# Patient Record
Sex: FEMALE | Race: WHITE | NOT HISPANIC OR LATINO | Employment: FULL TIME | URBAN - METROPOLITAN AREA
[De-identification: names, ages, dates, MRNs, and addresses within clinical notes are randomized per-mention and may not be internally consistent; named-entity substitution may affect disease eponyms.]

---

## 2017-04-24 ENCOUNTER — TRANSFERRED RECORDS (OUTPATIENT)
Dept: HEALTH INFORMATION MANAGEMENT | Facility: CLINIC | Age: 47
End: 2017-04-24

## 2017-04-27 ENCOUNTER — OFFICE VISIT (OUTPATIENT)
Dept: INTERNAL MEDICINE | Facility: CLINIC | Age: 47
End: 2017-04-27
Payer: COMMERCIAL

## 2017-04-27 VITALS
BODY MASS INDEX: 34.59 KG/M2 | DIASTOLIC BLOOD PRESSURE: 114 MMHG | TEMPERATURE: 97.9 F | SYSTOLIC BLOOD PRESSURE: 150 MMHG | WEIGHT: 207.6 LBS | HEIGHT: 65 IN | HEART RATE: 79 BPM | OXYGEN SATURATION: 97 %

## 2017-04-27 DIAGNOSIS — M75.101 TEAR OF RIGHT ROTATOR CUFF, UNSPECIFIED TEAR EXTENT: Primary | ICD-10-CM

## 2017-04-27 DIAGNOSIS — I10 ESSENTIAL HYPERTENSION WITH GOAL BLOOD PRESSURE LESS THAN 130/80: ICD-10-CM

## 2017-04-27 PROCEDURE — 99214 OFFICE O/P EST MOD 30 MIN: CPT | Performed by: INTERNAL MEDICINE

## 2017-04-27 RX ORDER — HYDROCODONE BITARTRATE AND ACETAMINOPHEN 5; 325 MG/1; MG/1
1 TABLET ORAL
COMMUNITY
Start: 2017-04-24 | End: 2017-05-09

## 2017-04-27 NOTE — NURSING NOTE
"Chief Complaint   Patient presents with     Hospital F/U       Initial BP (!) 150/114  Pulse 79  Temp 97.9  F (36.6  C) (Oral)  Ht 5' 5\" (1.651 m)  Wt 207 lb 9.6 oz (94.2 kg)  LMP 04/03/2017  SpO2 97%  BMI 34.55 kg/m2 Estimated body mass index is 34.55 kg/(m^2) as calculated from the following:    Height as of this encounter: 5' 5\" (1.651 m).    Weight as of this encounter: 207 lb 9.6 oz (94.2 kg).  Medication Reconciliation: complete    "

## 2017-04-27 NOTE — PROGRESS NOTES
"  SUBJECTIVE:                                                    Natali Nicholson is a 46 year old female who presents to clinic today for the following health issues:      ED/UC Followup:    Facility:  [facility]  Date of visit: 4-24-17  Reason for visit: Shoulder, arm pain  Current Status: still not able to raise it the pain is better has not started PT     Natali Nicholson is a 46 y.o. female who presented to the [facility] for evaluation of neck pain. She reports that 2 weeks ago she began developing atraumatic right sided neck/shoulder pain. Her pain has been persistent since then and 2 days ago she additionally awoke unable to left her right arm above her shoulder. Her pain does not radiate and is exacerbated by palpation and attempted range of motion. She denies any numbness or recent trauma/injury. Of note, she works an active job at [employer]. The patient voices no further concerns at this time.     Problem list and histories reviewed & adjusted, as indicated.  Additional history: as documented    Labs reviewed in EPIC    Reviewed and updated as needed this visit by clinical staff  Allergies       Reviewed and updated as needed this visit by Provider         ROS:  Constitutional, HEENT, cardiovascular, pulmonary, gi and gu systems are negative, except as otherwise noted.    OBJECTIVE:                                                    BP (!) 150/114  Pulse 79  Temp 97.9  F (36.6  C) (Oral)  Ht 5' 5\" (1.651 m)  Wt 207 lb 9.6 oz (94.2 kg)  LMP 04/03/2017  SpO2 97%  BMI 34.55 kg/m2  Body mass index is 34.55 kg/(m^2).  GENERAL APPEARANCE: alert and no distress  MS: unable to abduct R shoulder above 90 d. tender    Diagnostic test results:  none     ASSESSMENT/PLAN:                                                    1. Tear of right rotator cuff, unspecified tear extent  - MR Shoulder Right w/o Contrast; Future  - SPORTS MEDICINE REFERRAL    2. Essential hypertension with goal blood pressure less than " 130/80  con't meds. Recheck w/PCP      Follow up with Provider - prn      Lowell Davison MD  [facility]

## 2017-04-27 NOTE — MR AVS SNAPSHOT
After Visit Summary   4/27/2017    Natali Nicholson    MRN: [id number]           Patient Information     Date Of Birth          1970        Visit Information        Provider Department      4/27/2017 8:20 AM Lowell Davison MD [facility]        Today's Diagnoses     Tear of right rotator cuff, unspecified tear extent    -  1    Essential hypertension with goal blood pressure less than 130/80           Follow-ups after your visit        Additional Services     SPORTS MEDICINE REFERRAL       Your provider has referred you to:  [facility]: [facility] - [facility] [place] [facility] [phone]   [email or web address]    Please be aware that coverage of these services is subject to the terms and limitations of your health insurance plan.  Call member services at your health plan with any benefit or coverage questions.      Please bring the following to your appointment:    >>   Any x-rays, CTs or MRIs which have been performed.  Contact the facility where they were done to arrange for  prior to your scheduled appointment.    >>   List of current medications   >>   This referral request   >>   Any documents/labs given to you for this referral                  Future tests that were ordered for you today     Open Future Orders        Priority Expected Expires Ordered    MR Shoulder Right w/o Contrast Routine  4/27/2018 4/27/2017            Who to contact     If you have questions or need follow up information about today's clinic visit or your schedule please contact [facility] directly at [phone].  Normal or non-critical lab and imaging results will be communicated to you by MyChart, letter or phone within 4 business days after the clinic has received the results. If you do not hear from us within 7 days, please contact the clinic through MyChart or phone. If you  "have a critical or abnormal lab result, we will notify you by phone as soon as possible.  Submit refill requests through edupristine or call your pharmacy and they will forward the refill request to us. Please allow 3 business days for your refill to be completed.          Additional Information About Your Visit        [employer] Information     edupristine gives you secure access to your electronic health record. If you see a primary care provider, you can also send messages to your care team and make appointments. If you have questions, please call your primary care clinic.  If you do not have a primary care provider, please call [phone] and they will assist you.        Care EveryWhere ID     This is your Care EveryWhere ID. This could be used by other organizations to access your [place] medical records  [id number]        Your Vitals Were     Pulse Temperature Height Last Period Pulse Oximetry BMI (Body Mass Index)    79 97.9  F (36.6  C) (Oral) 5' 5\" (1.651 m) 04/03/2017 97% 34.55 kg/m2       Blood Pressure from Last 3 Encounters:   04/27/17 (!) 150/114   11/08/16 116/70   10/04/16 140/90    Weight from Last 3 Encounters:   04/27/17 207 lb 9.6 oz (94.2 kg)   11/08/16 199 lb (90.3 kg)   10/04/16 198 lb (89.8 kg)              We Performed the Following     SPORTS MEDICINE REFERRAL        Primary Care Provider Office Phone # Fax #    Pablo Gomez [phone]       [facility] [address] [place] [id number]        Thank you!     Thank you for choosing [facility]  for your care. Our goal is always to provide you with excellent care. Hearing back from our patients is one way we can continue to improve our services. Please take a few minutes to complete the written survey that you may receive in the mail after your visit with us. Thank you!             Your Updated Medication List - Protect others around you: Learn how to safely use, store and throw away your " medicines at www.disposemymeds.org.          This list is accurate as of: 4/27/17  8:55 AM.  Always use your most recent med list.                   Brand Name Dispense Instructions for use    aspirin 81 MG tablet     100    ONE DAILY       blood glucose monitoring lancets     1 Box    Use to test blood sugars 1 times daily or as directed. Reference #[id number]       blood glucose monitoring meter device kit     1 kit    Use to test blood sugar 1 times daily or as directed.  Reference #[id number]       blood glucose monitoring test strip    ONE TOUCH VERIO IQ    100 each    Use to test blood sugars 1 times daily or as directed.  Reference #[id number]       DULoxetine 30 MG EC capsule    CYMBALTA    180 capsule    Take 1 capsule (30 mg) by mouth 2 times daily       HYDROcodone-acetaminophen 5-325 MG per tablet    NORCO     Take 1 tablet by mouth       levothyroxine 88 MCG tablet    SYNTHROID/LEVOTHROID    90 tablet    Take 1 tablet (88 mcg) by mouth daily       lisinopril 40 MG tablet    PRINIVIL/ZESTRIL    90 tablet    Take 1 tablet (40 mg) by mouth daily       metFORMIN 1000 MG tablet    GLUCOPHAGE    180 tablet    Take 1 tablet (1,000 mg) by mouth 2 times daily (with meals) with breakfast and dinner.       simvastatin 40 MG tablet    ZOCOR    90 tablet    Take 1 tablet (40 mg) by mouth At Bedtime

## 2017-05-02 ENCOUNTER — HOSPITAL ENCOUNTER (OUTPATIENT)
Dept: MRI IMAGING | Facility: CLINIC | Age: 47
Discharge: HOME | End: 2017-05-02
Attending: INTERNAL MEDICINE | Admitting: INTERNAL MEDICINE
Payer: COMMERCIAL

## 2017-05-02 DIAGNOSIS — M75.101 TEAR OF RIGHT ROTATOR CUFF, UNSPECIFIED TEAR EXTENT: ICD-10-CM

## 2017-05-02 PROCEDURE — 73221 MRI JOINT UPR EXTREM W/O DYE: CPT | Mod: RT

## 2017-05-09 ENCOUNTER — OFFICE VISIT (OUTPATIENT)
Dept: ORTHOPEDICS | Facility: CLINIC | Age: 47
End: 2017-05-09
Payer: COMMERCIAL

## 2017-05-09 VITALS
DIASTOLIC BLOOD PRESSURE: 85 MMHG | BODY MASS INDEX: 34.49 KG/M2 | WEIGHT: 207 LBS | HEIGHT: 65 IN | SYSTOLIC BLOOD PRESSURE: 128 MMHG

## 2017-05-09 DIAGNOSIS — R29.898 WEAKNESS OF RIGHT UPPER EXTREMITY: Primary | ICD-10-CM

## 2017-05-09 DIAGNOSIS — M25.511 ACUTE PAIN OF RIGHT SHOULDER: ICD-10-CM

## 2017-05-09 PROCEDURE — 99204 OFFICE O/P NEW MOD 45 MIN: CPT | Performed by: PHYSICAL MEDICINE & REHABILITATION

## 2017-05-09 NOTE — MR AVS SNAPSHOT
After Visit Summary   5/9/2017    Natali Nicholson    MRN: [id number]           Patient Information     Date Of Birth          1970        Visit Information        Provider Department      5/9/2017 9:20 AM Juan High DO [facility] SPORTS MEDICINE        Today's Diagnoses     Weakness of right upper extremity    -  1    Acute pain of right shoulder          Care Instructions    We addressed the following today:    1.Weakness of the right upper extremity   2. Right shoulder pain    Activity modification as discussed  Topical Treatments: Ice  Over the counter medication: Acetaminophen (Tylenol) 1000mg every 6 hours with food (Maximum of 3000mg/day)  Other specific instructions: Jossue Orellana MD - [facility] - call [phone] to schedule EMG  Follow up 2-3 business days after EMG to discuss results (sooner if needed; call direct clinic number [[phone]] at any time with questions or concerns)            Follow-ups after your visit        Additional Services     ORTHO  REFERRAL       Coverage of these services is subject to the terms and limitations of your health insurance plan. Please call member services at your health plan with any benefit or coverage questions.       [facility] is referring you to the Orthopedic  Services at [facility].       The  representative will assist you in the coordination of your orthopedic and musculoskeletal care as prescribed by your physician.    The  representative will call you within 24 hours or   You may contact the  representative at:   [phone] for [place] and [facility]  [phone] for [facility], and [facility]  [phone] for [facility]    Type of Referral : Jossue Orellana MD - [facility] - call [phone] to schedule    EMG/NCV of the right upper extremity - evaluate for brachial plexopathy, peripheral  "nerve entrapment, trunk/cord/division abnormalities, etc.      Timeframe requested: Within 2 weeks       If X-rays, CT or MRI's   have been performed, please contact the facility where they were done, to arrange for  prior to your scheduled appointment. Please bring this referral request to your appointment and present it to your specialist.                  Who to contact     If you have questions or need follow up information about today's clinic visit or your schedule please contact [facility] SPORTS MEDICINE directly at [phone].  Normal or non-critical lab and imaging results will be communicated to you by SynCardia Systemshart, letter or phone within 4 business days after the clinic has received the results. If you do not hear from us within 7 days, please contact the clinic through Neuronext or phone. If you have a critical or abnormal lab result, we will notify you by phone as soon as possible.  Submit refill requests through YouRenew or call your pharmacy and they will forward the refill request to us. Please allow 3 business days for your refill to be completed.          Additional Information About Your Visit        YouRenew Information     YouRenew gives you secure access to your electronic health record. If you see a primary care provider, you can also send messages to your care team and make appointments. If you have questions, please call your primary care clinic.  If you do not have a primary care provider, please call [phone] and they will assist you.        Care EveryWhere ID     This is your Care EveryWhere ID. This could be used by other organizations to access your [place] medical records  [id number]        Your Vitals Were     Height Last Period BMI (Body Mass Index)             5' 5\" (1.651 m) 04/03/2017 34.45 kg/m2          Blood Pressure from Last 3 Encounters:   05/09/17 128/85   04/27/17 (!) 150/114   11/08/16 116/70    Weight from Last 3 Encounters:   05/09/17 207 lb (93.9 kg) "   04/27/17 207 lb 9.6 oz (94.2 kg)   11/08/16 199 lb (90.3 kg)              We Performed the Following     ORTHO  REFERRAL          Today's Medication Changes          These changes are accurate as of: 5/9/17 10:23 AM.  If you have any questions, ask your nurse or doctor.               Stop taking these medicines if you haven't already. Please contact your care team if you have questions.     HYDROcodone-acetaminophen 5-325 MG per tablet   Commonly known as:  NORCO   Stopped by:  Juan High,                     Primary Care Provider Office Phone # Fax #    Pablo Gomez [phone]       [facility] [address] [facility] [address]        Thank you!     Thank you for choosing [facility]  for your care. Our goal is always to provide you with excellent care. Hearing back from our patients is one way we can continue to improve our services. Please take a few minutes to complete the written survey that you may receive in the mail after your visit with us. Thank you!             Your Updated Medication List - Protect others around you: Learn how to safely use, store and throw away your medicines at www.disposemymeds.org.          This list is accurate as of: 5/9/17 10:23 AM.  Always use your most recent med list.                   Brand Name Dispense Instructions for use    aspirin 81 MG tablet     100    ONE DAILY       blood glucose monitoring lancets     1 Box    Use to test blood sugars 1 times daily or as directed. Reference #[id number]       blood glucose monitoring meter device kit     1 kit    Use to test blood sugar 1 times daily or as directed.  Reference #[id number]       blood glucose monitoring test strip    ONE TOUCH VERIO IQ    100 each    Use to test blood sugars 1 times daily or as directed.  Reference #[id number]       DULoxetine 30 MG EC capsule    CYMBALTA    180 capsule    Take 1 capsule (30 mg) by mouth 2 times daily        levothyroxine 88 MCG tablet    SYNTHROID/LEVOTHROID    90 tablet    Take 1 tablet (88 mcg) by mouth daily       lisinopril 40 MG tablet    PRINIVIL/ZESTRIL    90 tablet    Take 1 tablet (40 mg) by mouth daily       metFORMIN 1000 MG tablet    GLUCOPHAGE    180 tablet    Take 1 tablet (1,000 mg) by mouth 2 times daily (with meals) with breakfast and dinner.       simvastatin 40 MG tablet    ZOCOR    90 tablet    Take 1 tablet (40 mg) by mouth At Bedtime

## 2017-05-09 NOTE — PROGRESS NOTES
[facility]   Clinic Visit s May 9, 2017    Subjective:  Natali Nicholson is a 46 year old right-hand dominant female, who is seen in consultation at the request of Dr. Davison for evaluation of right shoulder/lateral upper arm pain.    Symptoms began 3.5 weeks ago.  Reports insidious onset without acute precipitating event.  Reports right shoulder pain that is located lateral with radiation absent.  Pain is 8/10 in maximal severity and 5/10 currently.  Symptoms are generally worse with nothing in particular and better with massage.  Other treatment has consisted of Norco and Ibuprofen with moderate relief.  Denies any numbness/tingling of the right upper extremity.  Denies any weakness of the right upper extremity.  Denies any previous right shoulder injuries/surgeries.    Patient's past medical, surgical, social, and family histories are reviewed today.  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    LUE C7 level     CTS (carpal tunnel syndrome) 10/3/2011     History of colposcopy with cervical biopsy 10/22/09,10/16/12    neg,neg     HTN (hypertension)      Hyperlipidemia LDL goal <100       Hypothyroid      Lumbago 10/07    Mild degenerative disc disease at L5-S1     Obesity      Papanicolaou smear of cervix with low grade squamous intraepithelial lesion (LGSIL) 10/18/10     Recurrent major depression (H)      Type 2 diabetes mellitus without complication  (goal A1C<7) 10/24/2015       Review of Systems:  Constitutional: NEGATIVE for fever, chills, or change in weight  Skin: NEGATIVE for worrisome rashes, moles, or lesions  Neuro: NEGATIVE for weakness of the right upper extremity  MSK: see HPI  Additional 10 point ROS is negative other than symptoms noted above and in HPI    Objective:  /85 (BP Location: Left arm, Patient Position: Chair, Cuff  "Size: Adult Regular)  Ht 5' 5\" (1.651 m)  Wt 207 lb (93.9 kg)  LMP 04/03/2017  BMI 34.45 kg/m2  General: healthy, alert, obese, and in no distress  Skin: no suspicious lesions or rashes  Psych: mentation appears normal and affect normal/bright  HEENT: no scleral icterus  CV: no pedal edema  Resp: normal respiratory effort without conversational dyspnea   Neuro: motor strength as noted below  Lymph: no palpable lymphadenopathy    MSK:    RIGHT SHOULDER  Inspection:    No swelling, bruising, discoloration, or obvious deformity/asymmetry  Palpation:    Tender about the anterior capsule    No tenderness over the AC joint, acromion, or greater tuberosity  Active Range of Motion:     Abduction 600 /  /  / IR T12.  Opposite arm abduction/flexion/ER within normal limits and IR T10  Passive Range of Motion:    Abduction and forward flexion within normal limits  Strength:    Deltoid 4+/5 / ER 4/5 / IR 5-/5 / biceps 4+/5 / triceps 4+/5 / wrist flexion/extension 5-/5 / finger flexion 4+/5  Special Tests:    Positive: Neer's (end range)    Negative: Rosario', crossed arm adduction, Twiggs's, and belly testing    Contralateral shoulder examined - range of motion, strength, and function within normal limits    Imaging:  No x-rays indicated during today's clinical visit   Previous films were reviewed today, independent visualization of images was performed, and results were discussed with the patient   MRI RIGHT SHOULDER WITHOUT CONTRAST - 5/2/2017   IMPRESSION:   1. Unremarkable MRI of the right shoulder with no rotator cuff tear seen.    ASSESSMENT:  1. Weakness of the right upper extremity  2. Acute right shoulder pain - differential diagnoses includes adhesive capsulitis, etc.    PLAN:  1. EMG/NCV of the right upper extremity for further evaluation of current medical state.  2. Activity modification as discussed, including limitation of activities that cause pain/discomfort.  3. Acetaminophen/ice as needed for " improved pain control.  4. Follow-up 1-2 business days after completion of further diagnostic testing for discussion of results/further medical care.  Consider formal physical therapy referral, right glenohumeral joint corticosteroid injection with ultrasound guidance, MRI of the brachial plexus, etc. as deemed appropriate moving forward. Instructed to contact our office should the condition evolve or worsen.    Patient's conditions were thoroughly discussed during today's visit with greater than 50% of the visit spent counseling the patient with total time spent face-to-face with the patient being 15 minutes.    Juan High DO, [facility]    Disclaimer: This note consists of symbols derived from keyboarding, dictation and/or voice recognition software. As a result, there may be errors in the script that have gone undetected. Please consider this when interpreting information found in this chart.

## 2017-05-09 NOTE — PATIENT INSTRUCTIONS
We addressed the following today:    1.Weakness of the right upper extremity   2. Right shoulder pain    Activity modification as discussed  Topical Treatments: Ice  Over the counter medication: Acetaminophen (Tylenol) 1000mg every 6 hours with food (Maximum of 3000mg/day)  Other specific instructions: Jossue Orellana MD - [facility] - call [phone] to schedule EMG  Follow up 2-3 business days after EMG to discuss results (sooner if needed; call direct clinic number [[phone]] at any time with questions or concerns)

## 2017-05-09 NOTE — Clinical Note
Dear Natali Black [place] [facility] [place].  Please refer to the visit note at your convenience and feel free to contact me should you have any questions.  Sincerely,  Juan High DO, [facility]

## 2017-05-30 ENCOUNTER — TRANSFERRED RECORDS (OUTPATIENT)
Dept: HEALTH INFORMATION MANAGEMENT | Facility: CLINIC | Age: 47
End: 2017-05-30

## 2017-06-02 ENCOUNTER — RADIANT APPOINTMENT (OUTPATIENT)
Dept: GENERAL RADIOLOGY | Facility: CLINIC | Age: 47
End: 2017-06-02
Attending: PHYSICAL MEDICINE & REHABILITATION
Payer: COMMERCIAL

## 2017-06-02 ENCOUNTER — OFFICE VISIT (OUTPATIENT)
Dept: ORTHOPEDICS | Facility: CLINIC | Age: 47
End: 2017-06-02
Payer: COMMERCIAL

## 2017-06-02 VITALS
HEIGHT: 65 IN | WEIGHT: 207 LBS | BODY MASS INDEX: 34.49 KG/M2 | SYSTOLIC BLOOD PRESSURE: 115 MMHG | DIASTOLIC BLOOD PRESSURE: 77 MMHG

## 2017-06-02 DIAGNOSIS — R29.898 WEAKNESS OF RIGHT UPPER EXTREMITY: Primary | ICD-10-CM

## 2017-06-02 DIAGNOSIS — M54.2 CERVICALGIA: ICD-10-CM

## 2017-06-02 PROCEDURE — 72040 X-RAY EXAM NECK SPINE 2-3 VW: CPT

## 2017-06-02 PROCEDURE — 99214 OFFICE O/P EST MOD 30 MIN: CPT | Performed by: PHYSICAL MEDICINE & REHABILITATION

## 2017-06-02 NOTE — NURSING NOTE
"Chief Complaint   Patient presents with     RECHECK     loss of function of R UE       Initial /77  Ht 5' 5\" (1.651 m)  Wt 207 lb (93.9 kg)  BMI 34.45 kg/m2 Estimated body mass index is 34.45 kg/(m^2) as calculated from the following:    Height as of this encounter: 5' 5\" (1.651 m).    Weight as of this encounter: 207 lb (93.9 kg).  Medication Reconciliation: complete     Vikash Mcqueen, ATC      "

## 2017-06-02 NOTE — MR AVS SNAPSHOT
After Visit Summary   6/2/2017    Natali Nicholson    MRN: [id number]           Patient Information     Date Of Birth          1970        Visit Information        Provider Department      6/2/2017 3:00 PM Juan High, DO [facility]        Today's Diagnoses     Weakness of right upper extremity    -  1      Care Instructions    We addressed the following today:    1. Weakness of right upper extremity    Activity modification as discussed  MRI at [facility] - call [phone] to schedule  Follow up 1-2 business days after completion of further diagnostic testing/imaging for discussion of results/further medical care (sooner if needed; call direct clinic number [[phone]] at any time with questions or concerns)              Follow-ups after your visit        Future tests that were ordered for you today     Open Future Orders        Priority Expected Expires Ordered    MR Cervical Spine w/o Contrast Routine 6/2/2017 6/2/2018 6/2/2017            Who to contact     If you have questions or need follow up information about today's clinic visit or your schedule please contact [facility] directly at [phone].  Normal or non-critical lab and imaging results will be communicated to you by My Ad Boxhart, letter or phone within 4 business days after the clinic has received the results. If you do not hear from us within 7 days, please contact the clinic through Realeyest or phone. If you have a critical or abnormal lab result, we will notify you by phone as soon as possible.  Submit refill requests through [employer] or call your pharmacy and they will forward the refill request to us. Please allow 3 business days for your refill to be completed.          Additional Information About Your Visit        My Ad Boxhart Information     [employer] gives you secure access to your electronic health record. If you see a primary care provider, you can also send messages to your care team and  "make appointments. If you have questions, please call your primary care clinic.  If you do not have a primary care provider, please call [phone] and they will assist you.        Care EveryWhere ID     This is your Care EveryWhere ID. This could be used by other organizations to access your [facility] medical records  [id number]        Your Vitals Were     Height BMI (Body Mass Index)                5' 5\" (1.651 m) 34.45 kg/m2           Blood Pressure from Last 3 Encounters:   06/02/17 115/77   05/09/17 128/85   04/27/17 (!) 150/114    Weight from Last 3 Encounters:   06/02/17 207 lb (93.9 kg)   05/09/17 207 lb (93.9 kg)   04/27/17 207 lb 9.6 oz (94.2 kg)               Primary Care Provider Office Phone # Fax #    Pablo Gomez [phone]       [facility] [address] [place] [address]        Thank you!     Thank you for choosing [facility]  for your care. Our goal is always to provide you with excellent care. Hearing back from our patients is one way we can continue to improve our services. Please take a few minutes to complete the written survey that you may receive in the mail after your visit with us. Thank you!             Your Updated Medication List - Protect others around you: Learn how to safely use, store and throw away your medicines at www.disposemymeds.org.          This list is accurate as of: 6/2/17  4:37 PM.  Always use your most recent med list.                   Brand Name Dispense Instructions for use    aspirin 81 MG tablet     100    ONE DAILY       blood glucose monitoring lancets     1 Box    Use to test blood sugars 1 times daily or as directed. Reference #[id number]       blood glucose monitoring meter device kit     1 kit    Use to test blood sugar 1 times daily or as directed.  Reference #[id number]       blood glucose monitoring test strip    ONE TOUCH VERIO IQ    100 each    Use to test blood sugars 1 times daily or as directed.  " Reference #[id number]       DULoxetine 30 MG EC capsule    CYMBALTA    180 capsule    Take 1 capsule (30 mg) by mouth 2 times daily       levothyroxine 88 MCG tablet    SYNTHROID/LEVOTHROID    90 tablet    Take 1 tablet (88 mcg) by mouth daily       lisinopril 40 MG tablet    PRINIVIL/ZESTRIL    90 tablet    Take 1 tablet (40 mg) by mouth daily       metFORMIN 1000 MG tablet    GLUCOPHAGE    180 tablet    Take 1 tablet (1,000 mg) by mouth 2 times daily (with meals) with breakfast and dinner.       simvastatin 40 MG tablet    ZOCOR    90 tablet    Take 1 tablet (40 mg) by mouth At Bedtime

## 2017-06-02 NOTE — PATIENT INSTRUCTIONS
We addressed the following today:    1. Weakness of right upper extremity    Activity modification as discussed  MRI at [facility] - call [phone] to schedule  Follow up 1-2 business days after completion of further diagnostic testing/imaging for discussion of results/further medical care (sooner if needed; call direct clinic number [[phone]] at any time with questions or concerns)

## 2017-06-02 NOTE — PROGRESS NOTES
" [facility]   Follow-up Visit s Jun 2, 2017    Subjective:  Natali Nicholson is a 46 year old female who is seen in follow up for evaluation of right upper extremity weakness for discussion of EMG/NCV results of the right upper extremity from [facility].  Last visit was on 5/9/2017.  Since that time, symptoms have been unchanged since last visit. Has not been using any oral pain medications for improvement of symptoms.     Notes weakness of the right upper extremity without any neck/shoulder/upper extremity pain. Denies any numbness/tingling in the right upper extremity. Denies any trauma/falls since last clinical visit.    Patient's past medical, surgical, social, and family histories are reviewed today    Objective:  /77  Ht 5' 5\" (1.651 m)  Wt 207 lb (93.9 kg)  BMI 34.45 kg/m2  General: healthy, alert, obese, and in no distress    Diagnostic Testing/Imaging:  Outside report from [facility] were reviewed today and results were discussed with the patient  Cervical spine x-rays were ordered, independent visualization was performed today, and results were discussed with the patient  Preliminary Impression:  1. Straightening of the normal cervical lordosis.  2. Minimal degenerative changes noted of the lower cervical spine.  3. Negative for fracture, subluxation, or other acute osseous abnormalities.    EMG/NCV of the Right Upper Extremity - 5/30/2017  Impression:  1. This is an abnormal study.  2. There is electrodiagnostic evidence of a severe acute right C5 radiculopathy with signs of acute denervation as well as decreased recruitment.  3. There is also electrodiagnostic evidence of a moderate right median mononeuropathy at the wrist with focal slowing of the motor and sensory fibers across the wrist and a decreased sensory amplitude on nerve conduction studies. There are no associated signs of acute denervation on needle electromyography.  4. " There is no electrodiagnostic evidence of brachial plexopathy within the possible finding discussed above, focal ulnar or radial neuropathy, or generalized polyneuropathy affecting the right upper limb.    ASSESSMENT:  1. Weakness of the right upper extremity    PLAN:  1. MRI of the cervical spine without contrast at [facility] for further evaluation of current medical state.  2. Activity modification as discussed, including limitation of activities that cause pain/discomfort.  3. Follow-up 1-2 business days after completion of further diagnostic testing/imaging for discussion of results/further medical care.  Consider MRI of the brachial plexus, etc. as deemed appropriate moving forward. Instructed to follow-up if change of symptoms arise.    Patient's conditions were thoroughly discussed during today's visit with greater than 50% of the visit spent counseling the patient with total time spent face-to-face with the patient being 15 minutes.    Juan High DO, [facility]    Disclaimer: This note consists of symbols derived from keyboarding, dictation and/or voice recognition software. As a result, there may be errors in the script that have gone undetected. Please consider this when interpreting information found in this chart.

## 2017-06-02 NOTE — Clinical Note
Dear Natali Guy saw me at [facility] on Jun 2, 2017.  Please refer to the visit note at your convenience and feel free to contact me should you have any questions.  Sincerely,  Juan High DO, [facility]

## 2017-06-15 ENCOUNTER — TRANSFERRED RECORDS (OUTPATIENT)
Dept: HEALTH INFORMATION MANAGEMENT | Facility: CLINIC | Age: 47
End: 2017-06-15

## 2017-06-20 ENCOUNTER — OFFICE VISIT (OUTPATIENT)
Dept: ORTHOPEDICS | Facility: CLINIC | Age: 47
End: 2017-06-20
Payer: COMMERCIAL

## 2017-06-20 VITALS
DIASTOLIC BLOOD PRESSURE: 72 MMHG | HEIGHT: 64 IN | SYSTOLIC BLOOD PRESSURE: 106 MMHG | WEIGHT: 207 LBS | BODY MASS INDEX: 35.34 KG/M2

## 2017-06-20 DIAGNOSIS — M50.20 PROTRUSION OF CERVICAL INTERVERTEBRAL DISC: ICD-10-CM

## 2017-06-20 DIAGNOSIS — M50.30 DEGENERATIVE DISC DISEASE, CERVICAL: ICD-10-CM

## 2017-06-20 DIAGNOSIS — M48.02 CERVICAL SPINAL STENOSIS: ICD-10-CM

## 2017-06-20 DIAGNOSIS — R29.898 WEAKNESS OF RIGHT UPPER EXTREMITY: Primary | ICD-10-CM

## 2017-06-20 PROCEDURE — 99214 OFFICE O/P EST MOD 30 MIN: CPT | Performed by: PHYSICAL MEDICINE & REHABILITATION

## 2017-06-20 NOTE — PATIENT INSTRUCTIONS
We addressed the following today:    1. Weakness of right upper extremity  2. Cervical disc protrusion/arthritis/spinal stenosis    Activity modification as discussed  Other specific instructions: Referral to spine surgery for consideration of surgical intervention for further treatment purposes  Follow up as needed for further evaluation/medical care (sooner if needed; call direct clinic number [[phone]] at any time with questions or concerns)

## 2017-06-20 NOTE — MR AVS SNAPSHOT
After Visit Summary   6/20/2017    Natali Nicholson    MRN: [id number]           Patient Information     Date Of Birth          1970        Visit Information        Provider Department      6/20/2017 10:40 AM Juan High DO [facility]        Today's Diagnoses     Weakness of right upper extremity    -  1    Cervical spinal stenosis        Protrusion of cervical intervertebral disc        Degenerative disc disease, cervical          Care Instructions    We addressed the following today:    1. Weakness of right upper extremity  2. Cervical disc protrusion/arthritis/spinal stenosis    Activity modification as discussed  Other specific instructions: Referral to spine surgery for consideration of surgical intervention for further treatment purposes  Follow up as needed for further evaluation/medical care (sooner if needed; call direct clinic number [[phone]] at any time with questions or concerns)              Follow-ups after your visit        Additional Services     ORTHO  REFERRAL       Coverage of these services is subject to the terms and limitations of your health insurance plan. Please call member services at your health plan with any benefit or coverage questions.       [facility] is referring you to the Orthopedic  Services at [facility].       The  representative will assist you in the coordination of your orthopedic and musculoskeletal care as prescribed by your physician.    The  representative will call you within 24 hours or   You may contact the  representative at:   [phone] for [facility]  [phone] for [facility], and [facility]  [phone] for [facility]    Type of Referral : Spine: Lumbar  **Choose Medical Spine Specialist (unless patient was seen by a Medical Spine Specialist within the past 6 months).**  Surgical Evaluation is advised  if the patient presents with one or more of the following red flags: Evidence of Spinal Tumor, Infection or Fracture, Cauda Equina Syndrome, Sudden or Progressive Weakness, Loss of Bowel or Bladder Control, or any other documented emergent neurological condition resulting from a Lumbar Spinal Condition. Spine Surgeon - Dr. Salgado ONLY - no mid level providers      Timeframe requested: 3 - 5 days       If X-rays, CT or MRI's   have been performed, please contact the facility where they were done, to arrange for  prior to your scheduled appointment. Please bring this referral request to your appointment and present it to your specialist.                  Follow-up notes from your care team     Return if symptoms worsen or fail to improve.      Who to contact     If you have questions or need follow up information about today's clinic visit or your schedule please contact [facility] directly at [phone].  Normal or non-critical lab and imaging results will be communicated to you by Atlassianhart, letter or phone within 4 business days after the clinic has received the results. If you do not hear from us within 7 days, please contact the clinic through Atlassianhart or phone. If you have a critical or abnormal lab result, we will notify you by phone as soon as possible.  Submit refill requests through [employer] or call your pharmacy and they will forward the refill request to us. Please allow 3 business days for your refill to be completed.          Additional Information About Your Visit        EventRegist Information     [employer] gives you secure access to your electronic health record. If you see a primary care provider, you can also send messages to your care team and make appointments. If you have questions, please call your primary care clinic.  If you do not have a primary care provider, please call [phone] and they will assist you.        Care EveryWhere ID     This is your Care EveryWhere ID.  "This could be used by other organizations to access your [place] medical records  [id number]        Your Vitals Were     Height BMI (Body Mass Index)                5' 4\" (1.626 m) 35.53 kg/m2           Blood Pressure from Last 3 Encounters:   06/20/17 106/72   06/02/17 115/77   05/09/17 128/85    Weight from Last 3 Encounters:   06/20/17 207 lb (93.9 kg)   06/02/17 207 lb (93.9 kg)   05/09/17 207 lb (93.9 kg)              We Performed the Following     ORTHO  REFERRAL        Primary Care Provider Office Phone # Fax #    Pablo Gomez [phone]       [facility] [address] [place] [address]        Thank you!     Thank you for choosing [facility]  for your care. Our goal is always to provide you with excellent care. Hearing back from our patients is one way we can continue to improve our services. Please take a few minutes to complete the written survey that you may receive in the mail after your visit with us. Thank you!             Your Updated Medication List - Protect others around you: Learn how to safely use, store and throw away your medicines at www.disposemymeds.org.          This list is accurate as of: 6/20/17 11:45 AM.  Always use your most recent med list.                   Brand Name Dispense Instructions for use    aspirin 81 MG tablet     100    ONE DAILY       blood glucose monitoring lancets     1 Box    Use to test blood sugars 1 times daily or as directed. Reference #[id number]       blood glucose monitoring meter device kit     1 kit    Use to test blood sugar 1 times daily or as directed.  Reference #[id number]       blood glucose monitoring test strip    ONE TOUCH VERIO IQ    100 each    Use to test blood sugars 1 times daily or as directed.  Reference #[id number]       DULoxetine 30 MG EC capsule    CYMBALTA    180 capsule    Take 1 capsule (30 mg) by mouth 2 times daily       levothyroxine 88 MCG tablet    " SYNTHROID/LEVOTHROID    90 tablet    Take 1 tablet (88 mcg) by mouth daily       lisinopril 40 MG tablet    PRINIVIL/ZESTRIL    90 tablet    Take 1 tablet (40 mg) by mouth daily       metFORMIN 1000 MG tablet    GLUCOPHAGE    180 tablet    Take 1 tablet (1,000 mg) by mouth 2 times daily (with meals) with breakfast and dinner.       simvastatin 40 MG tablet    ZOCOR    90 tablet    Take 1 tablet (40 mg) by mouth At Bedtime

## 2017-06-20 NOTE — PROGRESS NOTES
" [facility]   Follow-up Visit s 2017    Subjective:  Natali Nicholson is a 46 year old female who is seen in follow up for weakness of the right upper extremity for discussion of MRI of the cervical spine without contrast results from [facility].  Last visit was on 2017.  Since that time, symptoms have somewhat improved regarding upper extremity strength. Has not been using any oral pain medications for improvement of symptoms.     Notes weakness of the right upper extremity without associated right shoulder/neck pain. Symptoms are worse with activities of daily living. Denies any numbness/tingling of the right upper extremity. Notes weakness of the right upper extremity. Denies any trauma/falls since last clinical visit.    Patient's past medical, surgical, social, and family histories are reviewed today    Objective:  /72  Ht 5' 4\" (1.626 m)  Wt 207 lb (93.9 kg)  BMI 35.53 kg/m2  General: healthy, alert, and in no distress  Skin: no suspicious lesions or rashes  Neuro: motor exam as noted below    MSK:    CERVICAL SPINE  Strength:    Deltoid (C5) 4/5 (right), biceps (C6) 4+/5 (right), wrist extension (C6) 5-/5 (right), triceps (C7) 5-/5 (right), wrist flexion (C7) 5/5, and finger flexion (C8) 5/5  Special Tests:    Positive: None    Negative: Spurling's (right)    RIGHT SHOULDER  Strength:    ER 5-/5 / IR 5/5   Special Tests:    Positive: Supraspinatus (empty can) - weakness without pain  Imaging:  No x-rays indicated during today's visit  Outside films from [facility] were reviewed today, independent visualization of images was performed, and results were discussed with the patient  MRI of the Cervical Spine - 6/15/2017  CONCLUSION: Reversal of the cervical lordotic curvature with multilevel degenerative disc disease and specific findings according to level includin. C4-5 right posterior lateral and foraminal disc protrusion with right C5 nerve root impingement. Mild ventral " cord flattening with mild central spinal canal stenosis. Moderate to severe left foraminal stenosis.  2. C5-6 central disc protrusion with mild ventral cord flattening and no central spinal canal stenosis. Severe right foraminal stenosis with C6 nerve root impingement. Moderate to severe left foraminal stenosis.    ASSESSMENT:  1. Weakness of the right upper extremity  2. Cervical spinal stenosis  3. Cervical disc protrusion  4. Cervical degenerative disc disease    PLAN:  1. Referral to spine surgery for consideration of surgical intervention for further treatment purposes.  2. Activity modification as discussed, including limitation of activities that cause pain/discomfort.  3. Follow-up as needed for further evaluation/medical care.  Consider MRI of the right shoulder without contrast, right C5 selective nerve root block, right C6 selective nerve root block, etc. as deemed appropriate after spine surgery consultation.  Instructed to follow-up if change of symptoms arise.    Patient's conditions were thoroughly discussed during today's visit with greater than 50% of the visit spent counseling the patient with total time spent face-to-face with the patient being 15 minutes.    Juan High DO, [facility]    Disclaimer: This note consists of symbols derived from keyboarding, dictation and/or voice recognition software. As a result, there may be errors in the script that have gone undetected. Please consider this when interpreting information found in this chart.

## 2017-06-20 NOTE — NURSING NOTE
"Chief Complaint   Patient presents with     Musculoskeletal Problem     f/u MRI cervical spine from [facility]       Initial /72  Ht 5' 4\" (1.626 m)  Wt 207 lb (93.9 kg)  BMI 35.53 kg/m2 Estimated body mass index is 35.53 kg/(m^2) as calculated from the following:    Height as of this encounter: 5' 4\" (1.626 m).    Weight as of this encounter: 207 lb (93.9 kg).  Medication Reconciliation: complete     Talita Duckworth, ATC    "

## 2017-06-20 NOTE — Clinical Note
Dear Natali Guy saw me at [facility] [place], 2017.  Please refer to the visit note at your convenience and feel free to contact me should you have any questions.  Sincerely,  Juan High DO, [facility]

## 2017-07-11 ENCOUNTER — OFFICE VISIT (OUTPATIENT)
Dept: NEUROSURGERY | Facility: CLINIC | Age: 47
End: 2017-07-11
Attending: NEUROLOGICAL SURGERY
Payer: COMMERCIAL

## 2017-07-11 VITALS
TEMPERATURE: 98.1 F | DIASTOLIC BLOOD PRESSURE: 81 MMHG | BODY MASS INDEX: 33.82 KG/M2 | SYSTOLIC BLOOD PRESSURE: 114 MMHG | OXYGEN SATURATION: 97 % | HEIGHT: 65 IN | WEIGHT: 203 LBS | HEART RATE: 67 BPM

## 2017-07-11 DIAGNOSIS — R29.898 RIGHT ARM WEAKNESS: ICD-10-CM

## 2017-07-11 DIAGNOSIS — M54.12 CERVICAL RADICULOPATHY: Primary | ICD-10-CM

## 2017-07-11 PROCEDURE — 99211 OFF/OP EST MAY X REQ PHY/QHP: CPT | Performed by: NEUROLOGICAL SURGERY

## 2017-07-11 PROCEDURE — 99203 OFFICE O/P NEW LOW 30 MIN: CPT | Performed by: NEUROLOGICAL SURGERY

## 2017-07-11 NOTE — NURSING NOTE
"Natali Nicholson is a 46 year old female who presents for:  Chief Complaint   Patient presents with     Neurologic Problem     referral from Dr. High for cervical DDD        Initial Vitals:  There were no vitals taken for this visit. Estimated body mass index is 35.53 kg/(m^2) as calculated from the following:    Height as of 6/20/17: 5' 4\" (1.626 m).    Weight as of 6/20/17: 207 lb (93.9 kg).. There is no height or weight on file to calculate BSA. BP completed using cuff size: regular  forearm  Data Unavailable    Do you feel safe in your environment?  Yes  Do you need any refills today? No    Nursing Comments: referral from Dr. High for cervical DDD.  Patient rates her pain today as  Frozen and weakness right shoulder, hard to raise arm      5 min. nursing intake time  Magalis Barry, [facility], AAS      Discharge plan:   Patient Instructions  Pre-Operative:  -Surgery scheduled at [facility] for C4-6 ACDF (anterior cervical discectomy and fusion)  -Surgical risks: blood clots in the leg or lung, problems urinating, nerve damage, drainage from the incision, infection,stiffness  - Pre-operative physical with primary care physician within 30 days of surgical date.   -1 night hospitalization.    -Shower procedure: please shower with antimicrobial soap the night before surgery and morning of surgery. Please refer to showering instruction sheet in folder.  -Stop all solid foods 8 hours before surgery.  -Keep drinking clear liquids until 4 hours before surgery. Clear liquids include water, clear juice, black coffee, or clear tea without milk, Gatorade, clear soda.   - Discontinue Aspirin, NSAIDs (Advil/Ibuprofen, Naproxen,Nuprin,Relafen/Nabumetone, Diclofenac,Meloxicam, Aleve, Celebrex) x 7 days prior to surgical date.  - May try tylenol for pain 1000 mg three times per day for pain      Post-Operative:  -Do not begin taking Non-Steroidal Anti-Inflammatory Drugs or NSAIDs (Advil/ibuprofen, Naproxen,Nuprin, " Relafen/Nabumetone, Diclofenac,Meloxicam, Aleve, Celebrex, Aspirin, etc.) until 12 weeks after surgery. May cause bleeding and interfere with bone healing.   - Post operative incisional pain x 1-2 weeks which will require pain medications and muscle relaxants. You will receive medication upon discharge.  -Do NOT drive while taking narcotic pain medication.  -Do not drink alcohol while using any pain medication   -Post operative incision care- Watch for signs of infection: redness, swelling, warmth, drainage, and fever of 101 degrees or higher. Notify clinic [phone].  -No submerging incision in water such as pools, hot tubs,baths for at least 8 weeks or until incision is healed. Showers are fine.   - Post operative activity limitations: 6-8 weeks, no lifting > 10 pounds, no bending, twisting, or overhead reaching.  -Orthotics will fit you for a brace in the hospital.Cervical fusion: wear soft collar for up to 2 weeks as needed for comfort.  - Follow up appointments: 6 week post op follow up visit with Nurse practitioner and x-ray prior to appointment.Please call to schedule follow up appointment at [phone].  -If you are currently employed, you will need to be off work for 4-6 weeks for post op recovery and healing.     2 min. nursing discharge time  Magalis Barry CMA, AAS

## 2017-07-11 NOTE — NURSING NOTE
Pre-operative Education    Education included but not limited to:  - Pre-operative physical with primary care physician within 30 days of surgical date. Pre op to be completed at [facility].   - Pre-operative clearance (cardiology, hematology, etc).   - Discontinue Aspirin, NSAIDs, Diclofenac x 7 days prior to surgical date.   -May try tylenol for pain 1000 mg three times per day for pain  -Do not begin taking Non-Steroidal Anti-Inflammatory Drugs or NSAIDs (Advil,Motrin, Ibuprofen,Nuprin, Diclofenac,Meloxicam, Aleve, Celebrex, Aspirin, etc.) until 12 weeks after surgery if you had a fusion. May cause bleeding and interfere with bone healing.  -Patient is not a smoker  -Forms to be completed:fmla 6 weeks   -Surgical risks: blood clots in the leg or lung, problems urinating, nerve damage, drainage from the incision, infection,stiffness  -Preparation timeline  - When to start NPO           -Special bathing procedure.Patient received Hibiclens and showering instruction sheet.   Hospital stay:  Checking in  The surgery itself   Recovery room  - Transition to the hospital room where you will begin your recovery  - Managing pain   - 1 night hospitalization  - Post operative incisional pain x 1-2 weeks which will require pain medications and muscle relaxants.   -Do NOT drive while taking narcotic pain medication.  -Post operative incision care-Keep your incision clean and dry at all times. OK to remove dressing on postop day 2. OK to shower on postop day 3 and allow water to run over incision, pat dry after shower. No bathing, swimming or submerging in water. Call immediately or come to ED if any drainage occurs, or you develop new pain. Watch for signs of infection: redness, swelling, warmth, drainage, and fever of 101 degrees or higher. Notify clinic.   - Post operative activity limitations: 6-8 weeks, no lifting > 10 pounds, no bending, twisting, or overhead reaching.  -Orthotics will fit you for a brace in the  [facility].Cervical fusion: wear soft collar for up to 2 weeks as needed for comfort.  - Follow up appointments in 6 weeks and 3 months with Nurse Practitioner with X-ray prior. Please call to schedule follow up appointment at [phone].   - Spine Education book was also given to the patient for further review.      Patient verbalized understanding of above instructions. All questions were answered to the best of my ability and the patient's satisfaction. Patient advised to call with any additional questions or concerns.  Luiza Wells RN

## 2017-07-11 NOTE — PROGRESS NOTES
[facility]   Neurosurgery Consult Note         CC: RUE and neck pain    Primary care Provider: Pablo Gomez    Referring provider: Dr. Juan High      [place]: Natali Nicholson is a 46 year old female that presents to clinic with a complaint of RUE pain and neck pain. She describes not being able to abduct her right shoulder more than 90 degrees due to weakness. She has not tried PT or SANDRA. She has been seen by Dr. High.      Past Medical History:   Diagnosis Date     ASCUS on Pap smear 11/09    + HPV HR     Brachial neuritis or radiculitis NOS 9/03    LUE C7 level     CTS (carpal tunnel syndrome) 10/3/2011     History of colposcopy with cervical biopsy 10/22/09,10/16/12    neg,neg     HTN (hypertension)      Hyperlipidemia LDL goal <100       Hypothyroid      Lumbago 10/07    Mild degenerative disc disease at L5-S1     Obesity      Papanicolaou smear of cervix with low grade squamous intraepithelial lesion (LGSIL) 10/18/10     Recurrent major depression (H)      Type 2 diabetes mellitus without complication  (goal A1C<7) 10/24/2015       Past Surgical History:   Procedure Laterality Date     C NONSPECIFIC PROCEDURE  10/99    wisdom teeth extraction       Current Outpatient Prescriptions   Medication     levothyroxine (SYNTHROID, LEVOTHROID) 88 MCG tablet     blood glucose monitoring (ONETOUCH VERIO) meter device kit     blood glucose monitoring (ONE TOUCH VERIO IQ) test strip     blood glucose monitoring (ONE TOUCH DELICA) lancets     DULoxetine (CYMBALTA) 30 MG capsule     simvastatin (ZOCOR) 40 MG tablet     metFORMIN (GLUCOPHAGE) 1000 MG tablet     lisinopril (PRINIVIL,ZESTRIL) 40 MG tablet     ASPIRIN 81 MG OR TABS     No current facility-administered medications for this visit.        Allergies   Allergen Reactions     No Known Drug Allergies        Social History     Social History     Marital status: Single     Spouse name: N/A     Number of children: N/A     Years of education: N/A     Social  History Main Topics     Smoking status: Never Smoker     Smokeless tobacco: Never Used     Alcohol use No     Drug use: No     Sexual activity: No     Other Topics Concern     None     Social History Narrative       Family History   Problem Relation Age of Onset     HEART DISEASE Mother       age 48     DIABETES Mother      DIABETES Father      Psychotic Disorder Father      Schizophrenia     Allergies Brother      CANCER Maternal Grandmother      Cervical cancer     Depression Maternal Aunt          Review Of Systems  Skin: negative  Eyes: negative  Ears/Nose/Throat: negative  Respiratory: No shortness of breath, dyspnea on exertion, cough, or hemoptysis  Cardiovascular: negative  Gastrointestinal: negative  Genitourinary: negative  Musculoskeletal: as above  Neurologic: as above  Psychiatric: negative  Hematologic/Lymphatic/Immunologic: negative  Endocrine: negative    B/P: 114/81, T: 98.1, P: 67, R: Data Unavailable    Examination:  Awake  Alert  Oriented x 3  Speech clear  Cranial nerves II - XII intact  Face symmetric  Tongue midline  Neck nontender   Limited ROM of neck  Motor exam    RUE - deltoid 4-/5, biceps 4-/5, triceps 5/5, wrist extension 4/5, wrist flexion 4/5,  4/5   LUE - deltoid 5/5, biceps 5/5, triceps 5/5, wrist extension 5/5, wrist flexion 5/5,  5/5     Sensation decreased in RUE  Clonus negative  DTR 1+ throughout  Mckenzie's sign negative  Spurling's sign negative  Ambulation stable     Imaging:   MRI cervical - right C4-5 herniated disk and right C5-6 bilateral severe foraminal stenosis. Compression of the C5 and C6 roots    EMG/NCV - right C5 severe stenosis and moderate right carpal tunnel syndrome      Diagnosis:  Cervical radiculopathy with weakness    Assessment/Plan:   1. I have recommended a C4-6 ACDF. The patient will not get better with SANDRA or additional PT. I have recommended that proceeds in the near future and not wait too long with the extent of weakness. I  discussed with the patient the risk of surgery to include, but, not be limited to; dysphagia, hoarseness, nerve injury, pseudoarthrosis, failure of hardware, failure of improvement of symptoms, CSF leak,  infection, post op hematoma, the need for recurrent surgery, paralysis, coma and death.        Bharathi Salgado MD, MS, FAANS  Neurosurgeon  [facility]  [facility]  [address], [address]  [place]  [address]  Tel [phone]

## 2017-07-11 NOTE — PATIENT INSTRUCTIONS
Patient Instructions  Pre-Operative:  -Surgery scheduled at [facility] for C4-6 ACDF (anterior cervical discectomy and fusion)  -Surgical risks: blood clots in the leg or lung, problems urinating, nerve damage, drainage from the incision, infection,stiffness  - Pre-operative physical with primary care physician within 30 days of surgical date.   -1 night hospitalization.    -Shower procedure: please shower with antimicrobial soap the night before surgery and morning of surgery. Please refer to showering instruction sheet in folder.  -Stop all solid foods 8 hours before surgery.  -Keep drinking clear liquids until 4 hours before surgery. Clear liquids include water, clear juice, black coffee, or clear tea without milk, Gatorade, clear soda.   - Discontinue Aspirin, NSAIDs (Advil/Ibuprofen, Naproxen,Nuprin,Relafen/Nabumetone, Diclofenac,Meloxicam, Aleve, Celebrex) x 7 days prior to surgical date.  - May try tylenol for pain 1000 mg three times per day for pain      Post-Operative:  -Do not begin taking Non-Steroidal Anti-Inflammatory Drugs or NSAIDs (Advil/ibuprofen, Naproxen,Nuprin, Relafen/Nabumetone, Diclofenac,Meloxicam, Aleve, Celebrex, Aspirin, etc.) until 12 weeks after surgery. May cause bleeding and interfere with bone healing.   - Post operative incisional pain x 1-2 weeks which will require pain medications and muscle relaxants. You will receive medication upon discharge.  -Do NOT drive while taking narcotic pain medication.  -Do not drink alcohol while using any pain medication   -Post operative incision care- Watch for signs of infection: redness, swelling, warmth, drainage, and fever of 101 degrees or higher. Notify clinic [phone].  -No submerging incision in water such as pools, hot tubs,baths for at least 8 weeks or until incision is healed. Showers are fine.   - Post operative activity limitations: 6-8 weeks, no lifting > 10 pounds, no bending, twisting, or overhead  reaching.  -Orthotics will fit you for a brace in the hospital.Cervical fusion: wear soft collar for up to 2 weeks as needed for comfort.  - Follow up appointments: 6 week post op follow up visit with Nurse practitioner and x-ray prior to appointment.Please call to schedule follow up appointment at [phone].  -If you are currently employed, you will need to be off work for 4-6 weeks for post op recovery and healing.

## 2017-07-11 NOTE — MR AVS SNAPSHOT
After Visit Summary   7/11/2017    Natali Nicholson    MRN: [id number]           Patient Information     Date Of Birth          1970        Visit Information        Provider Department      7/11/2017 8:20 AM Bharathi Salgado MD [facility] [place] [facility]        Care Instructions    Patient Instructions  Pre-Operative:  -Surgery scheduled at [facility] for C4-6 ACDF (anterior cervical discectomy and fusion)  -Surgical risks: blood clots in the leg or lung, problems urinating, nerve damage, drainage from the incision, infection,stiffness  - Pre-operative physical with primary care physician within 30 days of surgical date.   -1 night hospitalization.    -Shower procedure: please shower with antimicrobial soap the night before surgery and morning of surgery. Please refer to showering instruction sheet in folder.  -Stop all solid foods 8 hours before surgery.  -Keep drinking clear liquids until 4 hours before surgery. Clear liquids include water, clear juice, black coffee, or clear tea without milk, Gatorade, clear soda.   - Discontinue Aspirin, NSAIDs (Advil/Ibuprofen, Naproxen,Nuprin,Relafen/Nabumetone, Diclofenac,Meloxicam, Aleve, Celebrex) x 7 days prior to surgical date.  - May try tylenol for pain 1000 mg three times per day for pain      Post-Operative:  -Do not begin taking Non-Steroidal Anti-Inflammatory Drugs or NSAIDs (Advil/ibuprofen, Naproxen,Nuprin, Relafen/Nabumetone, Diclofenac,Meloxicam, Aleve, Celebrex, Aspirin, etc.) until 12 weeks after surgery. May cause bleeding and interfere with bone healing.   - Post operative incisional pain x 1-2 weeks which will require pain medications and muscle relaxants. You will receive medication upon discharge.  -Do NOT drive while taking narcotic pain medication.  -Do not drink alcohol while using any pain medication   -Post operative incision care- Watch for signs of infection: redness, swelling, warmth,  drainage, and fever of 101 degrees or higher. Notify clinic [phone].  -No submerging incision in water such as pools, hot tubs,baths for at least 8 weeks or until incision is healed. Showers are fine.   - Post operative activity limitations: 6-8 weeks, no lifting > 10 pounds, no bending, twisting, or overhead reaching.  -Orthotics will fit you for a brace in the hospital.Cervical fusion: wear soft collar for up to 2 weeks as needed for comfort.  - Follow up appointments: 6 week post op follow up visit with Nurse practitioner and x-ray prior to appointment.Please call to schedule follow up appointment at [phone].  -If you are currently employed, you will need to be off work for 4-6 weeks for post op recovery and healing.                                 Follow-ups after your visit        Who to contact     If you have questions or need follow up information about today's clinic visit or your schedule please contact [facility] directly at [phone].  Normal or non-critical lab and imaging results will be communicated to you by [employer], letter or phone within 4 business days after the clinic has received the results. If you do not hear from us within 7 days, please contact the clinic through [employer] or phone. If you have a critical or abnormal lab result, we will notify you by phone as soon as possible.  Submit refill requests through [employer] or call your pharmacy and they will forward the refill request to us. Please allow 3 business days for your refill to be completed.          Additional Information About Your Visit        [employer] Information     [employer] gives you secure access to your electronic health record. If you see a primary care provider, you can also send messages to your care team and make appointments. If you have questions, please call your primary care clinic.  If you do not have a primary care provider, please call [phone] and they will assist you.       "  Care EveryWhere ID     This is your Care EveryWhere ID. This could be used by other organizations to access your [place] medical records  [id number]        Your Vitals Were     Pulse Temperature Height Last Period Pulse Oximetry Breastfeeding?    67 98.1  F (36.7  C) (Oral) 5' 5\" (1.651 m) 06/26/2017 (Exact Date) 97% No    BMI (Body Mass Index)                   33.78 kg/m2            Blood Pressure from Last 3 Encounters:   07/11/17 114/81   06/20/17 106/72   06/02/17 115/77    Weight from Last 3 Encounters:   07/11/17 203 lb (92.1 kg)   06/20/17 207 lb (93.9 kg)   06/02/17 207 lb (93.9 kg)              Today, you had the following     No orders found for display       Primary Care Provider Office Phone # Fax #    Pablo Gomez [phone]       [facility] [address] [facility] [address]        Equal Access to Services     FATOU MELÉNDEZ : Hadii aad ku hadasho Soomaali, waaxda luqadaha, qaybta kaalmada adeegyada, waxay idiin hayjohannn yani simmons . So [facility] [phone].    ATENCIÓN: Si habla español, tiene a crawley disposición servicios gratuitos de asistencia lingüística. Llame al [phone].    We comply with applicable federal civil rights laws and [place] laws. We do not discriminate on the basis of race, color, national origin, age, disability sex, sexual orientation or gender identity.            Thank you!     Thank you for choosing [facility]  for your care. Our goal is always to provide you with excellent care. Hearing back from our patients is one way we can continue to improve our services. Please take a few minutes to complete the written survey that you may receive in the mail after your visit with us. Thank you!             Your Updated Medication List - Protect others around you: Learn how to safely use, store and throw away your medicines at www.disposemymeds.org.          This list is accurate as of: 7/11/17  8:57 AM.  Always use " your most recent med list.                   Brand Name Dispense Instructions for use Diagnosis    aspirin 81 MG tablet     100    ONE DAILY    Diabetes mellitus (H), Hyperlipidemia, HTN (hypertension)       blood glucose monitoring lancets     1 Box    Use to test blood sugars 1 times daily or as directed. Reference #[id number]    Type 2 diabetes mellitus without complication, without long-term current use of insulin (H)       blood glucose monitoring meter device kit     1 kit    Use to test blood sugar 1 times daily or as directed.  Reference #[id number]    Type 2 diabetes mellitus without complication, without long-term current use of insulin (H)       blood glucose monitoring test strip    ONE TOUCH VERIO IQ    100 each    Use to test blood sugars 1 times daily or as directed.  Reference #[id number]    Type 2 diabetes mellitus without complication, without long-term current use of insulin (H)       DULoxetine 30 MG EC capsule    CYMBALTA    180 capsule    Take 1 capsule (30 mg) by mouth 2 times daily    Recurrent major depressive disorder, in full remission (H)       levothyroxine 88 MCG tablet    SYNTHROID/LEVOTHROID    90 tablet    Take 1 tablet (88 mcg) by mouth daily    Hypothyroidism, unspecified type       lisinopril 40 MG tablet    PRINIVIL/ZESTRIL    90 tablet    Take 1 tablet (40 mg) by mouth daily    Essential hypertension with goal blood pressure less than 130/80       metFORMIN 1000 MG tablet    GLUCOPHAGE    180 tablet    Take 1 tablet (1,000 mg) by mouth 2 times daily (with meals) with breakfast and dinner.    Type 2 diabetes, HbA1c goal < 7% (H)       simvastatin 40 MG tablet    ZOCOR    90 tablet    Take 1 tablet (40 mg) by mouth At Bedtime    Hyperlipidemia LDL goal <100

## 2017-08-14 ENCOUNTER — DOCUMENTATION ONLY (OUTPATIENT)
Dept: LAB | Facility: CLINIC | Age: 47
End: 2017-08-14

## 2017-08-14 DIAGNOSIS — E11.9 TYPE 2 DIABETES MELLITUS WITHOUT COMPLICATION, WITHOUT LONG-TERM CURRENT USE OF INSULIN (H): Primary | ICD-10-CM

## 2017-08-14 DIAGNOSIS — E03.9 HYPOTHYROIDISM, UNSPECIFIED TYPE: ICD-10-CM

## 2017-08-15 DIAGNOSIS — E03.9 HYPOTHYROIDISM, UNSPECIFIED TYPE: ICD-10-CM

## 2017-08-15 DIAGNOSIS — E11.9 TYPE 2 DIABETES MELLITUS WITHOUT COMPLICATION, WITHOUT LONG-TERM CURRENT USE OF INSULIN (H): ICD-10-CM

## 2017-08-15 LAB
ALBUMIN SERPL-MCNC: 3.7 G/DL (ref 3.4–5)
ALP SERPL-CCNC: 56 U/L (ref 40–150)
ALT SERPL W P-5'-P-CCNC: 52 U/L (ref 0–50)
ANION GAP SERPL CALCULATED.3IONS-SCNC: 5 MMOL/L (ref 3–14)
AST SERPL W P-5'-P-CCNC: 28 U/L (ref 0–45)
BILIRUB SERPL-MCNC: 0.5 MG/DL (ref 0.2–1.3)
BUN SERPL-MCNC: 9 MG/DL (ref 7–30)
CALCIUM SERPL-MCNC: 8.8 MG/DL (ref 8.5–10.1)
CHLORIDE SERPL-SCNC: 103 MMOL/L (ref 94–109)
CHOLEST SERPL-MCNC: 155 MG/DL
CO2 SERPL-SCNC: 28 MMOL/L (ref 20–32)
CREAT SERPL-MCNC: 0.81 MG/DL (ref 0.52–1.04)
CREAT UR-MCNC: 182 MG/DL
GFR SERPL CREATININE-BSD FRML MDRD: 76 ML/MIN/1.7M2
GLUCOSE SERPL-MCNC: 134 MG/DL (ref 70–99)
HBA1C MFR BLD: 7.2 % (ref 4.3–6)
HDLC SERPL-MCNC: 41 MG/DL
LDLC SERPL CALC-MCNC: 76 MG/DL
MICROALBUMIN UR-MCNC: 11 MG/L
MICROALBUMIN/CREAT UR: 6.26 MG/G CR (ref 0–25)
NONHDLC SERPL-MCNC: 114 MG/DL
POTASSIUM SERPL-SCNC: 3.9 MMOL/L (ref 3.4–5.3)
PROT SERPL-MCNC: 7.9 G/DL (ref 6.8–8.8)
SODIUM SERPL-SCNC: 136 MMOL/L (ref 133–144)
TRIGL SERPL-MCNC: 188 MG/DL

## 2017-08-15 PROCEDURE — 80053 COMPREHEN METABOLIC PANEL: CPT | Performed by: INTERNAL MEDICINE

## 2017-08-15 PROCEDURE — 83036 HEMOGLOBIN GLYCOSYLATED A1C: CPT | Performed by: INTERNAL MEDICINE

## 2017-08-15 PROCEDURE — 84443 ASSAY THYROID STIM HORMONE: CPT | Performed by: INTERNAL MEDICINE

## 2017-08-15 PROCEDURE — 36415 COLL VENOUS BLD VENIPUNCTURE: CPT | Performed by: INTERNAL MEDICINE

## 2017-08-15 PROCEDURE — 82043 UR ALBUMIN QUANTITATIVE: CPT | Performed by: INTERNAL MEDICINE

## 2017-08-15 PROCEDURE — 84439 ASSAY OF FREE THYROXINE: CPT | Performed by: INTERNAL MEDICINE

## 2017-08-15 PROCEDURE — 80061 LIPID PANEL: CPT | Performed by: INTERNAL MEDICINE

## 2017-08-16 LAB
T4 FREE SERPL-MCNC: 1 NG/DL (ref 0.76–1.46)
TSH SERPL DL<=0.005 MIU/L-ACNC: 5.26 MU/L (ref 0.4–4)

## 2017-09-12 ENCOUNTER — HOSPITAL ENCOUNTER (OUTPATIENT)
Dept: LAB | Facility: CLINIC | Age: 47
Discharge: HOME | End: 2017-09-12
Attending: NEUROLOGICAL SURGERY | Admitting: NEUROLOGICAL SURGERY
Payer: COMMERCIAL

## 2017-09-12 ENCOUNTER — OFFICE VISIT (OUTPATIENT)
Dept: INTERNAL MEDICINE | Facility: CLINIC | Age: 47
End: 2017-09-12
Payer: COMMERCIAL

## 2017-09-12 VITALS
BODY MASS INDEX: 33.95 KG/M2 | HEART RATE: 72 BPM | TEMPERATURE: 98.4 F | OXYGEN SATURATION: 99 % | DIASTOLIC BLOOD PRESSURE: 80 MMHG | WEIGHT: 204 LBS | SYSTOLIC BLOOD PRESSURE: 112 MMHG

## 2017-09-12 DIAGNOSIS — Z01.812 PRE-OPERATIVE LABORATORY EXAMINATION: ICD-10-CM

## 2017-09-12 DIAGNOSIS — E11.9 TYPE 2 DIABETES MELLITUS WITHOUT COMPLICATION, WITHOUT LONG-TERM CURRENT USE OF INSULIN (H): ICD-10-CM

## 2017-09-12 DIAGNOSIS — Z01.818 PREOP GENERAL PHYSICAL EXAM: Primary | ICD-10-CM

## 2017-09-12 DIAGNOSIS — M54.12 CERVICAL RADICULOPATHY: ICD-10-CM

## 2017-09-12 DIAGNOSIS — F33.9 RECURRENT MAJOR DEPRESSIVE DISORDER, REMISSION STATUS UNSPECIFIED (H): ICD-10-CM

## 2017-09-12 DIAGNOSIS — E03.9 HYPOTHYROIDISM, UNSPECIFIED TYPE: ICD-10-CM

## 2017-09-12 LAB
ERYTHROCYTE [DISTWIDTH] IN BLOOD BY AUTOMATED COUNT: 12.8 % (ref 10–15)
HCT VFR BLD AUTO: 40.8 % (ref 35–47)
HGB BLD-MCNC: 13.3 G/DL (ref 11.7–15.7)
MCH RBC QN AUTO: 28.6 PG (ref 26.5–33)
MCHC RBC AUTO-ENTMCNC: 32.6 G/DL (ref 31.5–36.5)
MCV RBC AUTO: 88 FL (ref 78–100)
MRSA DNA SPEC QL NAA+PROBE: NEGATIVE
PLATELET # BLD AUTO: 283 10E9/L (ref 150–450)
RBC # BLD AUTO: 4.65 10E12/L (ref 3.8–5.2)
SPECIMEN SOURCE: NORMAL
WBC # BLD AUTO: 8.4 10E9/L (ref 4–11)

## 2017-09-12 PROCEDURE — 99207 C FOOT EXAM  NO CHARGE: CPT | Performed by: INTERNAL MEDICINE

## 2017-09-12 PROCEDURE — 40000830 ZZHCL STATISTIC STAPH AUREUS METH RESIST SCREEN PCR: Performed by: NEUROLOGICAL SURGERY

## 2017-09-12 PROCEDURE — 87641 MR-STAPH DNA AMP PROBE: CPT | Performed by: NEUROLOGICAL SURGERY

## 2017-09-12 PROCEDURE — 87640 STAPH A DNA AMP PROBE: CPT | Performed by: NEUROLOGICAL SURGERY

## 2017-09-12 PROCEDURE — 36415 COLL VENOUS BLD VENIPUNCTURE: CPT | Performed by: INTERNAL MEDICINE

## 2017-09-12 PROCEDURE — 99215 OFFICE O/P EST HI 40 MIN: CPT | Mod: 25 | Performed by: INTERNAL MEDICINE

## 2017-09-12 PROCEDURE — 93000 ELECTROCARDIOGRAM COMPLETE: CPT | Performed by: INTERNAL MEDICINE

## 2017-09-12 PROCEDURE — 85027 COMPLETE CBC AUTOMATED: CPT | Performed by: INTERNAL MEDICINE

## 2017-09-12 PROCEDURE — 40000829 ZZHCL STATISTIC STAPH AUREUS SUSCEPT SCREEN PCR: Performed by: NEUROLOGICAL SURGERY

## 2017-09-12 RX ORDER — LEVOTHYROXINE SODIUM 100 UG/1
100 TABLET ORAL DAILY
Qty: 90 TABLET | Refills: 3 | Status: SHIPPED | OUTPATIENT
Start: 2017-09-12 | End: 2018-04-13

## 2017-09-12 ASSESSMENT — PATIENT HEALTH QUESTIONNAIRE - PHQ9: SUM OF ALL RESPONSES TO PHQ QUESTIONS 1-9: 0

## 2017-09-12 NOTE — NURSING NOTE
"Chief Complaint   Patient presents with     Pre-Op Exam       Initial /80  Pulse 72  Temp 98.4  F (36.9  C) (Oral)  Wt 204 lb (92.5 kg)  LMP 08/21/2017 (Within Days)  SpO2 99%  BMI 33.95 kg/m2 Estimated body mass index is 33.95 kg/(m^2) as calculated from the following:    Height as of 7/11/17: 5' 5\" (1.651 m).    Weight as of this encounter: 204 lb (92.5 kg).  Medication Reconciliation: complete  "

## 2017-09-12 NOTE — PATIENT INSTRUCTIONS
Stop Levothyroxine 88mcg tab  Start Levothyroxine 100mcg tab, 1 tab daily in AM for thyroid  Nonfasting TSH thyroid lab in 6 weeks (early November)  Nonfasting A1C lab for diabetes in mid December  Hold Aspirin 1 week prior to surgery and then restart after surgery   Nothing to eat/drink after midnight prior to surgery except take Levothyroxoine and Duloxetine that AM with a small sip water  Flu shot in October  Eye exam this Fall    Reduce calorie/carbohydrate (sugar, bread, potato, pasta, rice, etc)  intake  in diet for weight loss, increase color on your plate with fruits and vegetables and meats and increase  frequency of walking or other aerobic exercise as able (goal is daily)

## 2017-09-12 NOTE — MR AVS SNAPSHOT
After Visit Summary   9/12/2017    Natali Nicholson    MRN: [id number]           Patient Information     Date Of Birth          1970        Visit Information        Provider Department      9/12/2017 9:00 AM Pablo Gomez MD [facility]        Today's Diagnoses     Preop general physical exam    -  1    Cervical radiculopathy        Type 2 diabetes mellitus without complication, without long-term current use of insulin (H)        Hypothyroidism, unspecified type          Care Instructions     Stop Levothyroxine 88mcg tab  Start Levothyroxine 100mcg tab, 1 tab daily in AM for thyroid  Nonfasting TSH thyroid lab in 6 weeks (early November)  Nonfasting A1C lab for diabetes in mid December  Hold Aspirin 1 week prior to surgery and then restart after surgery   Nothing to eat/drink after midnight prior to surgery except take Levothyroxoine and Duloxetine that AM with a small sip water  Flu shot in October  Eye exam this Fall           Follow-ups after your visit        Your next 10 appointments already scheduled     Sep 20, 2017   Procedure with Bharathi Salgado MD   [facility] (--)    [address] [facility] [id number] [phone]              Future tests that were ordered for you today     Open Future Orders        Priority Expected Expires Ordered    TSH with free T4 reflex Routine 10/24/2017 9/12/2018 9/12/2017    Hemoglobin A1c Routine 12/11/2017 9/12/2018 9/12/2017            Who to contact     If you have questions or need follow up information about today's clinic visit or your schedule please contact [facility] directly at [phone].  Normal or non-critical lab and imaging results will be communicated to you by MyChart, letter or phone within 4 business days after the clinic has received the results. If you do not hear from us within 7 days, please contact the clinic through MyChart or phone. If you  have a critical or abnormal lab result, we will notify you by phone as soon as possible.  Submit refill requests through [employer] or call your pharmacy and they will forward the refill request to us. Please allow 3 business days for your refill to be completed.          Additional Information About Your Visit        Georgina Goodmanhart Information     [employer] gives you secure access to your electronic health record. If you see a primary care provider, you can also send messages to your care team and make appointments. If you have questions, please call your primary care clinic.  If you do not have a primary care provider, please call [phone] and they will assist you.        Care EveryWhere ID     This is your Care EveryWhere ID. This could be used by other organizations to access your [place] medical records  [id number]        Your Vitals Were     Pulse Temperature Last Period Pulse Oximetry BMI (Body Mass Index)       72 98.4  F (36.9  C) (Oral) 08/21/2017 (Within Days) 99% 33.95 kg/m2        Blood Pressure from Last 3 Encounters:   09/12/17 112/80   07/11/17 114/81   06/20/17 106/72    Weight from Last 3 Encounters:   09/12/17 204 lb (92.5 kg)   07/11/17 203 lb (92.1 kg)   06/20/17 207 lb (93.9 kg)              We Performed the Following     CBC with platelets     EKG 12-lead complete w/read - Clinics     FOOT EXAM          Today's Medication Changes          These changes are accurate as of: 9/12/17  9:48 AM.  If you have any questions, ask your nurse or doctor.               These medicines have changed or have updated prescriptions.        Dose/Directions    levothyroxine 100 MCG tablet   Commonly known as:  SYNTHROID/LEVOTHROID   This may have changed:    - medication strength  - how much to take   Used for:  Hypothyroidism, unspecified type   Changed by:  Pablo Gomez MD        Dose:  100 mcg   Take 1 tablet (100 mcg) by mouth daily   Quantity:  90 tablet   Refills:  3            Where to get your medicines       [facility] [place], [place] - [address] AT Portal to Registered [facility] Sites  [address], [facility] [address]     Phone:  [phone]     levothyroxine 100 MCG tablet                Primary Care Provider Office Phone # Fax #    Pablo Gomez [phone]       [address] [facility] [address]        Equal Access to Services     HERIBERTO MELÉNDEZ : Hadii aad ku hadasho Soomaali, waaxda luqadaha, qaybta kaalmada adeegyada, waxay idiin hayaan adeeg kharash lacindi sesay. So [facility] [phone].    ATENCIÓN: Si habla español, tiene a crawley disposición servicios gratuitos de asistencia lingüística. [facility] [phone].    We comply with applicable federal civil rights laws and [place] laws. We do not discriminate on the basis of race, color, national origin, age, disability sex, sexual orientation or gender identity.            Thank you!     Thank you for choosing [facility]  for your care. Our goal is always to provide you with excellent care. Hearing back from our patients is one way we can continue to improve our services. Please take a few minutes to complete the written survey that you may receive in the mail after your visit with us. Thank you!             Your Updated Medication List - Protect others around you: Learn how to safely use, store and throw away your medicines at www.disposemymeds.org.          This list is accurate as of: 9/12/17  9:48 AM.  Always use your most recent med list.                   Brand Name Dispense Instructions for use Diagnosis    aspirin 81 MG tablet     100    ONE DAILY    Diabetes mellitus (H), Hyperlipidemia, HTN (hypertension)       blood glucose monitoring lancets     1 Box    Use to test blood sugars 1 times daily or as directed. Reference #[id number]    Type 2 diabetes mellitus without complication, without long-term current use of insulin (H)       blood glucose  monitoring meter device kit     1 kit    Use to test blood sugar 1 times daily or as directed.  Reference #[id number]    Type 2 diabetes mellitus without complication, without long-term current use of insulin (H)       blood glucose monitoring test strip    ONE TOUCH VERIO IQ    100 each    Use to test blood sugars 1 times daily or as directed.  Reference #[id number]    Type 2 diabetes mellitus without complication, without long-term current use of insulin (H)       DULoxetine 30 MG EC capsule    CYMBALTA    180 capsule    Take 1 capsule (30 mg) by mouth 2 times daily    Recurrent major depressive disorder, in full remission (H)       levothyroxine 100 MCG tablet    SYNTHROID/LEVOTHROID    90 tablet    Take 1 tablet (100 mcg) by mouth daily    Hypothyroidism, unspecified type       lisinopril 40 MG tablet    PRINIVIL/ZESTRIL    90 tablet    Take 1 tablet (40 mg) by mouth daily    Essential hypertension with goal blood pressure less than 130/80       metFORMIN 1000 MG tablet    GLUCOPHAGE    180 tablet    Take 1 tablet (1,000 mg) by mouth 2 times daily (with meals) with breakfast and dinner.    Type 2 diabetes, HbA1c goal < 7% (H)       simvastatin 40 MG tablet    ZOCOR    90 tablet    Take 1 tablet (40 mg) by mouth At Bedtime    Hyperlipidemia LDL goal <100

## 2017-09-12 NOTE — PROGRESS NOTES
[address] [address]  [phone]  Dept: [phone]    PRE-OP EVALUATION:  Today's date: 2017    Natali Nicholson (: 1970) presents for pre-operative evaluation assessment as requested by Dr. Salgado.  She requires evaluation and anesthesia risk assessment prior to undergoing surgery/procedure for treatment of  Cervical radiculopathy.   Proposed procedure: Combined Discectomy, Fusion Cervical Anterior Two Levels (C4-6 ACDF)  Date of Surgery/ Procedure: 2017  Time of Surgery/ Procedure: 6:00am  Hospital/Surgical Facility: [facility]  Primary Physician: Pablo Gomez  Type of Anesthesia Anticipated: General    Patient has a Health Care Directive or Living Will:  NO    1. NO - Do you have a history of heart attack, stroke, stent, bypass or surgery on an artery in the head, neck, heart or legs?  2. NO - Do you ever have any pain or discomfort in your chest?  3. NO - Do you have a history of  Heart Failure?  4. NO - Are you troubled by shortness of breath when: walking on the level, up a slight hill or at night?  5. NO - Do you currently have a cold, bronchitis or other respiratory infection?  6. NO - Do you have a cough, shortness of breath or wheezing?  7. NO - Do you sometimes get pains in the calves of your legs when you walk?  8. NO - Do you or anyone in your family have previous history of blood clots?  9. NO - Do you or does anyone in your family have a serious bleeding problem such as prolonged bleeding following surgeries or cuts?  10. NO - Have you ever had problems with anemia or been told to take iron pills?  11. NO - Have you had any abnormal blood loss such as black, tarry or bloody stools, or abnormal vaginal bleeding?  12. NO - Have you ever had a blood transfusion?  13. NO - Have you or any of your relatives ever had problems with anesthesia?  14. NO - Do you have sleep apnea, excessive snoring or daytime  drowsiness?  15. NO - Do you have any prosthetic heart valves?  16. NO - Do you have prosthetic joints?  17. NO - Is there any chance that you may be pregnant? LMP 3 weeks ago. Not sexually active since then        HPI:                                                      Brief HPI related to upcoming procedure: Hx right neck pain and RUE radicular pain issues with some RUE weakness. MRI cervical showed  right C4-5 herniated disk and right C5-6 bilateral severe foraminal stenosis. Compression of the C5 and C6 roots. Now presents for clearance to undergo surgical correction as above. See below for other medical issues. DM control has generally been excellent with A1C's in the 6s. Recent  Test worsened slightly with A1C 7.2 but acceptable for surgery. Good exercise tolerance.  Walking 2-3x/week for 4-5 blocks without chest pain or shortness of breath. BP, and lipids at goal with meds. Thyroid function slightly low with current Levothyroxine    .ejvdml       MEDICAL HISTORY:                                                    Patient Active Problem List    Diagnosis Date Noted     Hypothyroidism, unspecified type 08/14/2017     Priority: Medium     Papanicolaou smear of cervix with low grade squamous intraepithelial lesion (LGSIL) 12/13/2016     Priority: Medium     06/02/08 NL pap  07/10/09 ASCUS pap, +HPV HR, pt. Is to schedule a colp  10/22/09 colp done-neg, pt. Is to have a repeat pap in one year.  10/18/10 LSIL cannot exclude HGSIL, pt. Is to schedule a colp.  10/16/12 colp done-neg, pt. Is to have a repeat pap in one year.  11/08/16 NL pap, neg HPV, pt. Is to have co-testing in three year, HM and HX updated,tracking has been started       Type 2 diabetes mellitus without complication, without long-term current use of insulin (H) 11/08/2016     Priority: Medium     Non morbid obesity, unspecified obesity type 11/08/2016     Priority: Medium     Essential hypertension with goal blood pressure less than 130/80  10/04/2016     Priority: Medium     CTS (carpal tunnel syndrome) 10/03/2011     Priority: Medium     HYPERLIPIDEMIA LDL GOAL <100 10/31/2010     Priority: Medium     Recurrent major depression (H)      Priority: Medium      Past Medical History:   Diagnosis Date     ASCUS on Pap smear 11/09    + HPV HR     Brachial neuritis or radiculitis NOS 9/03    LUE C7 level     CTS (carpal tunnel syndrome) 10/3/2011     History of colposcopy with cervical biopsy 10/22/09,10/16/12    neg,neg     HTN (hypertension)      Hyperlipidemia LDL goal <100       Hypothyroid      Lumbago 10/07    Mild degenerative disc disease at L5-S1     Obesity      Papanicolaou smear of cervix with low grade squamous intraepithelial lesion (LGSIL) 10/18/10     Recurrent major depression (H)      Type 2 diabetes mellitus without complication  (goal A1C<7) 10/24/2015     Past Surgical History:   Procedure Laterality Date     C NONSPECIFIC PROCEDURE  10/99    wisdom teeth extraction     Current Outpatient Prescriptions   Medication Sig Dispense Refill     levothyroxine (SYNTHROID, LEVOTHROID) 88 MCG tablet Take 1 tablet (88 mcg) by mouth daily 90 tablet 1     blood glucose monitoring (ONETOUCH VERIO) meter device kit Use to test blood sugar 1 times daily or as directed.  Reference #[id number] 1 kit 0     blood glucose monitoring (ONE TOUCH VERIO IQ) test strip Use to test blood sugars 1 times daily or as directed.  Reference #[id number] 100 each 3     blood glucose monitoring (ONE TOUCH DELICA) lancets Use to test blood sugars 1 times daily or as directed. Reference #[id number] 1 Box 3     DULoxetine (CYMBALTA) 30 MG capsule Take 1 capsule (30 mg) by mouth 2 times daily 180 capsule 3     simvastatin (ZOCOR) 40 MG tablet Take 1 tablet (40 mg) by mouth At Bedtime 90 tablet 3     metFORMIN (GLUCOPHAGE) 1000 MG tablet Take 1 tablet (1,000 mg) by mouth 2 times daily (with meals) with breakfast and dinner. 180 tablet 3     lisinopril (PRINIVIL,ZESTRIL) 40 MG  tablet Take 1 tablet (40 mg) by mouth daily 90 tablet 3     ASPIRIN 81 MG OR TABS ONE DAILY 100 3     OTC products: None, except as noted above    Allergies   Allergen Reactions     No Known Drug Allergies       Latex Allergy: NO    Social History   Substance Use Topics     Smoking status: Never Smoker     Smokeless tobacco: Never Used     Alcohol use No     History   Drug Use No       REVIEW OF SYSTEMS:                                                    C: NEGATIVE for fever, chills, change in weight  I: NEGATIVE for worrisome rashes, moles or lesions  E: NEGATIVE for vision changes or irritation. Has glasses.  Eye exam 1 year ago  E/M: NEGATIVE for ear, mouth and throat problems  R: NEGATIVE for significant cough or SOB  B: NEGATIVE for masses, tenderness or discharge  CV: NEGATIVE for chest pain, palpitations or peripheral edema  GI: NEGATIVE for nausea, abdominal pain, heartburn, or change in bowel habits  : NEGATIVE for frequency, dysuria, or hematuria  M: . See HPI. Other joints OK  N: NEGATIVE for  Dizziness. See HPI re: RUE weakness/numnbess  E: POSITIVE for med controlled diabetes mellitus . On statin therapy. Thyroid function a little too low with current Levothyroxine dosing  H: NEGATIVE for bleeding problems  P: NEGATIVE for changes in mood or affect    EXAM:                                                    /80  Pulse 72  Temp 98.4  F (36.9  C) (Oral)  Wt 204 lb (92.5 kg)  LMP 08/21/2017 (Within Days)  SpO2 99%  BMI 33.95 kg/m2  Eye: PERRL, EOMI  HENT: ear canals and TM's normal and nose and mouth without ulcers or lesions   Neck: no adenopathy. Thyroid normal to palpation. No bruits. Tenderness to palpation right paracervical musculature  Pulm: Lungs clear to auscultation   CV: Regular rates and rhythm  GI: Soft, mldly obese, nontender, Normal active bowel sounds, No hepatosplenomegaly or masses palpable  Ext: Peripheral pulses intact. No edema.  Neuro: Normal  tone, sensory exam grossly  normal. Normal strength except 4+/5 strength RUE      DIAGNOSTICS:                                                    EKG:  NSR. Rate 73. No acute ST/T changes    Component      Latest Ref Rng & Units 8/9/2016 8/15/2017 9/12/2017   Sodium      133 - 144 mmol/L  136    Potassium      3.4 - 5.3 mmol/L  3.9    Chloride      94 - 109 mmol/L  103    Carbon Dioxide      20 - 32 mmol/L  28    Anion Gap      3 - 14 mmol/L  5    Glucose      70 - 99 mg/dL  134 (H)    Urea Nitrogen      7 - 30 mg/dL  9    Creatinine      0.52 - 1.04 mg/dL  0.81    GFR Estimate      >60 mL/min/1.7m2  76    GFR Estimate If Black      >60 mL/min/1.7m2  >90    Calcium      8.5 - 10.1 mg/dL  8.8    Bilirubin Total      0.2 - 1.3 mg/dL  0.5    Albumin      3.4 - 5.0 g/dL  3.7    Protein Total      6.8 - 8.8 g/dL  7.9    Alkaline Phosphatase      40 - 150 U/L  56    ALT      0 - 50 U/L  52 (H)    AST      0 - 45 U/L  28    WBC      4.0 - 11.0 10e9/L   8.4   RBC Count      3.8 - 5.2 10e12/L   4.65   Hemoglobin      11.7 - 15.7 g/dL   13.3   Hematocrit      35.0 - 47.0 %   40.8   MCV      78 - 100 fl   88   MCH      26.5 - 33.0 pg   28.6   MCHC      31.5 - 36.5 g/dL   32.6   RDW      10.0 - 15.0 %   12.8   Platelet Count      150 - 450 10e9/L   283   Specimen Description         Nares   S Aur Meth Resis PCR      NEG:Negative   Negative   Hemoglobin A1C      4.3 - 6.0 % 6.3 (H) 7.2 (H)    TSH      0.40 - 4.00 mU/L  5.26 (H)    T4 Free      0.76 - 1.46 ng/dL  1.00      IMPRESSION:                                                    Reason for surgery/procedure:  Cervical radiculopathy right C4-6  Diagnosis/reason for consult: Diabetes (controlled with oral meds), hypertension (stable), hypothyroidism (needs higher dose Levothyroxine), hyperlipidemia (controlled), depression (controlled with PHQ=0 on Duloxetine), mild obesity (no known snoring/apnea issues)    The proposed surgical procedure is considered INTERMEDIATE risk.    REVISED CARDIAC RISK  INDEX  The patient has the following serious cardiovascular risks for perioperative complications such as (MI, PE, VFib and 3  AV Block):  No serious cardiac risks  INTERPRETATION: 0 risks: Class I (very low risk - 0.4% complication rate)    The patient has the following additional risks for perioperative complications:  No identified additional risks         RECOMMENDATIONS:                                                        APPROVAL GIVEN to proceed with proposed procedure, without further diagnostic evaluation     PLAN:   Stop Levothyroxine 88mcg tab  Start Levothyroxine 100mcg tab, 1 tab daily in AM for thyroid  Nonfasting TSH thyroid lab in 6 weeks (early November)  Nonfasting A1C lab for diabetes in mid December  Hold Aspirin 1 week prior to surgery and then restart after surgery   Nothing to eat/drink after midnight prior to surgery except take Levothyroxoine and Duloxetine that AM with a small sip water  Flu shot in October  Eye exam this Fall    Reduce calorie/carbohydrate (sugar, bread, potato, pasta, rice, etc)  intake  in diet for weight loss, increase color on your plate with fruits and vegetables and meats and increase  frequency of walking or other aerobic exercise as able (goal is daily)       Signed Electronically by: Pablo Gomez MD    Copy of this evaluation report is provided to requesting physician.    [facility] Preop Guidelines

## 2017-09-19 NOTE — H&P (VIEW-ONLY)
[address] [address]  [phone]  Dept: [phone]    PRE-OP EVALUATION:  Today's date: 2017    Natali Nicholson (: 1970) presents for pre-operative evaluation assessment as requested by Dr. Salgado.  She requires evaluation and anesthesia risk assessment prior to undergoing surgery/procedure for treatment of  Cervical radiculopathy.   Proposed procedure: Combined Discectomy, Fusion Cervical Anterior Two Levels (C4-6 ACDF)  Date of Surgery/ Procedure: 2017  Time of Surgery/ Procedure: 6:00am  Hospital/Surgical Facility: [facility]  Primary Physician: Pablo Gomez  Type of Anesthesia Anticipated: General    Patient has a Health Care Directive or Living Will:  NO    1. NO - Do you have a history of heart attack, stroke, stent, bypass or surgery on an artery in the head, neck, heart or legs?  2. NO - Do you ever have any pain or discomfort in your chest?  3. NO - Do you have a history of  Heart Failure?  4. NO - Are you troubled by shortness of breath when: walking on the level, up a slight hill or at night?  5. NO - Do you currently have a cold, bronchitis or other respiratory infection?  6. NO - Do you have a cough, shortness of breath or wheezing?  7. NO - Do you sometimes get pains in the calves of your legs when you walk?  8. NO - Do you or anyone in your family have previous history of blood clots?  9. NO - Do you or does anyone in your family have a serious bleeding problem such as prolonged bleeding following surgeries or cuts?  10. NO - Have you ever had problems with anemia or been told to take iron pills?  11. NO - Have you had any abnormal blood loss such as black, tarry or bloody stools, or abnormal vaginal bleeding?  12. NO - Have you ever had a blood transfusion?  13. NO - Have you or any of your relatives ever had problems with anesthesia?  14. NO - Do you have sleep apnea, excessive snoring or daytime  drowsiness?  15. NO - Do you have any prosthetic heart valves?  16. NO - Do you have prosthetic joints?  17. NO - Is there any chance that you may be pregnant? LMP 3 weeks ago. Not sexually active since then        HPI:                                                      Brief HPI related to upcoming procedure: Hx right neck pain and RUE radicular pain issues with some RUE weakness. MRI cervical showed  right C4-5 herniated disk and right C5-6 bilateral severe foraminal stenosis. Compression of the C5 and C6 roots. Now presents for clearance to undergo surgical correction as above. See below for other medical issues. DM control has generally been excellent with A1C's in the 6s. Recent  Test worsened slightly with A1C 7.2 but acceptable for surgery. Good exercise tolerance.  Walking 2-3x/week for 4-5 blocks without chest pain or shortness of breath. BP, and lipids at goal with meds. Thyroid function slightly low with current Levothyroxine    .ejvdml       MEDICAL HISTORY:                                                    Patient Active Problem List    Diagnosis Date Noted     Hypothyroidism, unspecified type 08/14/2017     Priority: Medium     Papanicolaou smear of cervix with low grade squamous intraepithelial lesion (LGSIL) 12/13/2016     Priority: Medium     06/02/08 NL pap  07/10/09 ASCUS pap, +HPV HR, pt. Is to schedule a colp  10/22/09 colp done-neg, pt. Is to have a repeat pap in one year.  10/18/10 LSIL cannot exclude HGSIL, pt. Is to schedule a colp.  10/16/12 colp done-neg, pt. Is to have a repeat pap in one year.  11/08/16 NL pap, neg HPV, pt. Is to have co-testing in three year, HM and HX updated,tracking has been started       Type 2 diabetes mellitus without complication, without long-term current use of insulin (H) 11/08/2016     Priority: Medium     Non morbid obesity, unspecified obesity type 11/08/2016     Priority: Medium     Essential hypertension with goal blood pressure less than 130/80  10/04/2016     Priority: Medium     CTS (carpal tunnel syndrome) 10/03/2011     Priority: Medium     HYPERLIPIDEMIA LDL GOAL <100 10/31/2010     Priority: Medium     Recurrent major depression (H)      Priority: Medium      Past Medical History:   Diagnosis Date     ASCUS on Pap smear 11/09    + HPV HR     Brachial neuritis or radiculitis NOS 9/03    LUE C7 level     CTS (carpal tunnel syndrome) 10/3/2011     History of colposcopy with cervical biopsy 10/22/09,10/16/12    neg,neg     HTN (hypertension)      Hyperlipidemia LDL goal <100       Hypothyroid      Lumbago 10/07    Mild degenerative disc disease at L5-S1     Obesity      Papanicolaou smear of cervix with low grade squamous intraepithelial lesion (LGSIL) 10/18/10     Recurrent major depression (H)      Type 2 diabetes mellitus without complication  (goal A1C<7) 10/24/2015     Past Surgical History:   Procedure Laterality Date     C NONSPECIFIC PROCEDURE  10/99    wisdom teeth extraction     Current Outpatient Prescriptions   Medication Sig Dispense Refill     levothyroxine (SYNTHROID, LEVOTHROID) 88 MCG tablet Take 1 tablet (88 mcg) by mouth daily 90 tablet 1     blood glucose monitoring (ONETOUCH VERIO) meter device kit Use to test blood sugar 1 times daily or as directed.  Reference #[id number] 1 kit 0     blood glucose monitoring (ONE TOUCH VERIO IQ) test strip Use to test blood sugars 1 times daily or as directed.  Reference #[id number] 100 each 3     blood glucose monitoring (ONE TOUCH DELICA) lancets Use to test blood sugars 1 times daily or as directed. Reference #[id number] 1 Box 3     DULoxetine (CYMBALTA) 30 MG capsule Take 1 capsule (30 mg) by mouth 2 times daily 180 capsule 3     simvastatin (ZOCOR) 40 MG tablet Take 1 tablet (40 mg) by mouth At Bedtime 90 tablet 3     metFORMIN (GLUCOPHAGE) 1000 MG tablet Take 1 tablet (1,000 mg) by mouth 2 times daily (with meals) with breakfast and dinner. 180 tablet 3     lisinopril (PRINIVIL,ZESTRIL) 40 MG  tablet Take 1 tablet (40 mg) by mouth daily 90 tablet 3     ASPIRIN 81 MG OR TABS ONE DAILY 100 3     OTC products: None, except as noted above    Allergies   Allergen Reactions     No Known Drug Allergies       Latex Allergy: NO    Social History   Substance Use Topics     Smoking status: Never Smoker     Smokeless tobacco: Never Used     Alcohol use No     History   Drug Use No       REVIEW OF SYSTEMS:                                                    C: NEGATIVE for fever, chills, change in weight  I: NEGATIVE for worrisome rashes, moles or lesions  E: NEGATIVE for vision changes or irritation. Has glasses.  Eye exam 1 year ago  E/M: NEGATIVE for ear, mouth and throat problems  R: NEGATIVE for significant cough or SOB  B: NEGATIVE for masses, tenderness or discharge  CV: NEGATIVE for chest pain, palpitations or peripheral edema  GI: NEGATIVE for nausea, abdominal pain, heartburn, or change in bowel habits  : NEGATIVE for frequency, dysuria, or hematuria  M: . See HPI. Other joints OK  N: NEGATIVE for  Dizziness. See HPI re: RUE weakness/numnbess  E: POSITIVE for med controlled diabetes mellitus . On statin therapy. Thyroid function a little too low with current Levothyroxine dosing  H: NEGATIVE for bleeding problems  P: NEGATIVE for changes in mood or affect    EXAM:                                                    /80  Pulse 72  Temp 98.4  F (36.9  C) (Oral)  Wt 204 lb (92.5 kg)  LMP 08/21/2017 (Within Days)  SpO2 99%  BMI 33.95 kg/m2  Eye: PERRL, EOMI  HENT: ear canals and TM's normal and nose and mouth without ulcers or lesions   Neck: no adenopathy. Thyroid normal to palpation. No bruits. Tenderness to palpation right paracervical musculature  Pulm: Lungs clear to auscultation   CV: Regular rates and rhythm  GI: Soft, mldly obese, nontender, Normal active bowel sounds, No hepatosplenomegaly or masses palpable  Ext: Peripheral pulses intact. No edema.  Neuro: Normal  tone, sensory exam grossly  normal. Normal strength except 4+/5 strength RUE      DIAGNOSTICS:                                                    EKG:  NSR. Rate 73. No acute ST/T changes    Component      Latest Ref Rng & Units 8/9/2016 8/15/2017 9/12/2017   Sodium      133 - 144 mmol/L  136    Potassium      3.4 - 5.3 mmol/L  3.9    Chloride      94 - 109 mmol/L  103    Carbon Dioxide      20 - 32 mmol/L  28    Anion Gap      3 - 14 mmol/L  5    Glucose      70 - 99 mg/dL  134 (H)    Urea Nitrogen      7 - 30 mg/dL  9    Creatinine      0.52 - 1.04 mg/dL  0.81    GFR Estimate      >60 mL/min/1.7m2  76    GFR Estimate If Black      >60 mL/min/1.7m2  >90    Calcium      8.5 - 10.1 mg/dL  8.8    Bilirubin Total      0.2 - 1.3 mg/dL  0.5    Albumin      3.4 - 5.0 g/dL  3.7    Protein Total      6.8 - 8.8 g/dL  7.9    Alkaline Phosphatase      40 - 150 U/L  56    ALT      0 - 50 U/L  52 (H)    AST      0 - 45 U/L  28    WBC      4.0 - 11.0 10e9/L   8.4   RBC Count      3.8 - 5.2 10e12/L   4.65   Hemoglobin      11.7 - 15.7 g/dL   13.3   Hematocrit      35.0 - 47.0 %   40.8   MCV      78 - 100 fl   88   MCH      26.5 - 33.0 pg   28.6   MCHC      31.5 - 36.5 g/dL   32.6   RDW      10.0 - 15.0 %   12.8   Platelet Count      150 - 450 10e9/L   283   Specimen Description         Nares   S Aur Meth Resis PCR      NEG:Negative   Negative   Hemoglobin A1C      4.3 - 6.0 % 6.3 (H) 7.2 (H)    TSH      0.40 - 4.00 mU/L  5.26 (H)    T4 Free      0.76 - 1.46 ng/dL  1.00      IMPRESSION:                                                    Reason for surgery/procedure:  Cervical radiculopathy right C4-6  Diagnosis/reason for consult: Diabetes (controlled with oral meds), hypertension (stable), hypothyroidism (needs higher dose Levothyroxine), hyperlipidemia (controlled), depression (controlled with PHQ=0 on Duloxetine), mild obesity (no known snoring/apnea issues)    The proposed surgical procedure is considered INTERMEDIATE risk.    REVISED CARDIAC RISK  INDEX  The patient has the following serious cardiovascular risks for perioperative complications such as (MI, PE, VFib and 3  AV Block):  No serious cardiac risks  INTERPRETATION: 0 risks: Class I (very low risk - 0.4% complication rate)    The patient has the following additional risks for perioperative complications:  No identified additional risks         RECOMMENDATIONS:                                                        APPROVAL GIVEN to proceed with proposed procedure, without further diagnostic evaluation     PLAN:   Stop Levothyroxine 88mcg tab  Start Levothyroxine 100mcg tab, 1 tab daily in AM for thyroid  Nonfasting TSH thyroid lab in 6 weeks (early November)  Nonfasting A1C lab for diabetes in mid December  Hold Aspirin 1 week prior to surgery and then restart after surgery   Nothing to eat/drink after midnight prior to surgery except take Levothyroxoine and Duloxetine that AM with a small sip water  Flu shot in October  Eye exam this Fall    Reduce calorie/carbohydrate (sugar, bread, potato, pasta, rice, etc)  intake  in diet for weight loss, increase color on your plate with fruits and vegetables and meats and increase  frequency of walking or other aerobic exercise as able (goal is daily)       Signed Electronically by: Pablo Gomez MD    Copy of this evaluation report is provided to requesting physician.    [facility] Preop Guidelines

## 2017-09-20 ENCOUNTER — HOSPITAL ENCOUNTER (INPATIENT)
Facility: CLINIC | Age: 47
LOS: 1 days | Discharge: HOME | DRG: 472 | End: 2017-09-21
Attending: NEUROLOGICAL SURGERY | Admitting: NEUROLOGICAL SURGERY
Payer: COMMERCIAL

## 2017-09-20 ENCOUNTER — APPOINTMENT (OUTPATIENT)
Dept: GENERAL RADIOLOGY | Facility: CLINIC | Age: 47
DRG: 472 | End: 2017-09-20
Attending: NEUROLOGICAL SURGERY
Payer: COMMERCIAL

## 2017-09-20 ENCOUNTER — SURGERY (OUTPATIENT)
Age: 47
End: 2017-09-20

## 2017-09-20 ENCOUNTER — ANESTHESIA EVENT (OUTPATIENT)
Dept: SURGERY | Facility: CLINIC | Age: 47
DRG: 472 | End: 2017-09-20
Payer: COMMERCIAL

## 2017-09-20 ENCOUNTER — ANESTHESIA (OUTPATIENT)
Dept: SURGERY | Facility: CLINIC | Age: 47
DRG: 472 | End: 2017-09-20
Payer: COMMERCIAL

## 2017-09-20 DIAGNOSIS — Z98.1 S/P CERVICAL SPINAL FUSION: Primary | ICD-10-CM

## 2017-09-20 DIAGNOSIS — R33.9 URINARY RETENTION: ICD-10-CM

## 2017-09-20 LAB
ABO + RH BLD: NORMAL
ABO + RH BLD: NORMAL
BLD GP AB SCN SERPL QL: NORMAL
BLOOD BANK CMNT PATIENT-IMP: NORMAL
CREAT SERPL-MCNC: 0.66 MG/DL (ref 0.52–1.04)
GFR SERPL CREATININE-BSD FRML MDRD: >90 ML/MIN/1.7M2
GLUCOSE BLDC GLUCOMTR-MCNC: 121 MG/DL (ref 70–99)
GLUCOSE BLDC GLUCOMTR-MCNC: 136 MG/DL (ref 70–99)
GLUCOSE BLDC GLUCOMTR-MCNC: 174 MG/DL (ref 70–99)
HCG UR QL: NEGATIVE
POTASSIUM SERPL-SCNC: 3.8 MMOL/L (ref 3.4–5.3)
SPECIMEN EXP DATE BLD: NORMAL

## 2017-09-20 PROCEDURE — 37000009 ZZH ANESTHESIA TECHNICAL FEE, EACH ADDTL 15 MIN: Performed by: NEUROLOGICAL SURGERY

## 2017-09-20 PROCEDURE — 25000128 H RX IP 250 OP 636: Performed by: ANESTHESIOLOGY

## 2017-09-20 PROCEDURE — 25000128 H RX IP 250 OP 636: Performed by: NEUROLOGICAL SURGERY

## 2017-09-20 PROCEDURE — 01N10ZZ RELEASE CERVICAL NERVE, OPEN APPROACH: ICD-10-PCS | Performed by: NEUROLOGICAL SURGERY

## 2017-09-20 PROCEDURE — 84132 ASSAY OF SERUM POTASSIUM: CPT | Performed by: ANESTHESIOLOGY

## 2017-09-20 PROCEDURE — 25000300 ZZH OR RX SURGIFLO HEMOSTATIC MATRIX 10ML 199102S OPNP: Performed by: NEUROLOGICAL SURGERY

## 2017-09-20 PROCEDURE — C1713 ANCHOR/SCREW BN/BN,TIS/BN: HCPCS | Performed by: NEUROLOGICAL SURGERY

## 2017-09-20 PROCEDURE — 12000007 ZZH R&B INTERMEDIATE

## 2017-09-20 PROCEDURE — 00000146 ZZHCL STATISTIC GLUCOSE BY METER IP

## 2017-09-20 PROCEDURE — 25000125 ZZHC RX 250: Performed by: NEUROLOGICAL SURGERY

## 2017-09-20 PROCEDURE — 37000008 ZZH ANESTHESIA TECHNICAL FEE, 1ST 30 MIN: Performed by: NEUROLOGICAL SURGERY

## 2017-09-20 PROCEDURE — 40000306 ZZH STATISTIC PRE PROC ASSESS II: Performed by: NEUROLOGICAL SURGERY

## 2017-09-20 PROCEDURE — 86900 BLOOD TYPING SEROLOGIC ABO: CPT | Performed by: ANESTHESIOLOGY

## 2017-09-20 PROCEDURE — 93010 ELECTROCARDIOGRAM REPORT: CPT | Performed by: INTERNAL MEDICINE

## 2017-09-20 PROCEDURE — 22853 INSJ BIOMECHANICAL DEVICE: CPT | Performed by: NEUROLOGICAL SURGERY

## 2017-09-20 PROCEDURE — 22551 ARTHRD ANT NTRBDY CERVICAL: CPT | Performed by: NEUROLOGICAL SURGERY

## 2017-09-20 PROCEDURE — 71000012 ZZH RECOVERY PHASE 1 LEVEL 1 FIRST HR: Performed by: NEUROLOGICAL SURGERY

## 2017-09-20 PROCEDURE — C1762 CONN TISS, HUMAN(INC FASCIA): HCPCS | Performed by: NEUROLOGICAL SURGERY

## 2017-09-20 PROCEDURE — 25800025 ZZH RX 258: Performed by: NEUROLOGICAL SURGERY

## 2017-09-20 PROCEDURE — 27210794 ZZH OR GENERAL SUPPLY STERILE: Performed by: NEUROLOGICAL SURGERY

## 2017-09-20 PROCEDURE — 27210995 ZZH RX 272: Performed by: NEUROLOGICAL SURGERY

## 2017-09-20 PROCEDURE — 86850 RBC ANTIBODY SCREEN: CPT | Performed by: ANESTHESIOLOGY

## 2017-09-20 PROCEDURE — 0RB30ZZ EXCISION OF CERVICAL VERTEBRAL DISC, OPEN APPROACH: ICD-10-PCS | Performed by: NEUROLOGICAL SURGERY

## 2017-09-20 PROCEDURE — 36000071 ZZH SURGERY LEVEL 5 W FLUORO 1ST 30 MIN: Performed by: NEUROLOGICAL SURGERY

## 2017-09-20 PROCEDURE — 86901 BLOOD TYPING SEROLOGIC RH(D): CPT | Performed by: ANESTHESIOLOGY

## 2017-09-20 PROCEDURE — 25000566 ZZH SEVOFLURANE, EA 15 MIN: Performed by: NEUROLOGICAL SURGERY

## 2017-09-20 PROCEDURE — 0RG20A0 FUSION OF 2 OR MORE CERVICAL VERTEBRAL JOINTS WITH INTERBODY FUSION DEVICE, ANTERIOR APPROACH, ANTERIOR COLUMN, OPEN APPROACH: ICD-10-PCS | Performed by: NEUROLOGICAL SURGERY

## 2017-09-20 PROCEDURE — 25000128 H RX IP 250 OP 636: Performed by: NURSE ANESTHETIST, CERTIFIED REGISTERED

## 2017-09-20 PROCEDURE — 25000125 ZZHC RX 250: Performed by: NURSE ANESTHETIST, CERTIFIED REGISTERED

## 2017-09-20 PROCEDURE — 25000132 ZZH RX MED GY IP 250 OP 250 PS 637: Performed by: NEUROLOGICAL SURGERY

## 2017-09-20 PROCEDURE — 22552 ARTHRD ANT NTRBD CERVICAL EA: CPT | Performed by: NEUROLOGICAL SURGERY

## 2017-09-20 PROCEDURE — 36000069 ZZH SURGERY LEVEL 5 EA 15 ADDTL MIN: Performed by: NEUROLOGICAL SURGERY

## 2017-09-20 PROCEDURE — 82565 ASSAY OF CREATININE: CPT | Performed by: ANESTHESIOLOGY

## 2017-09-20 PROCEDURE — 36415 COLL VENOUS BLD VENIPUNCTURE: CPT | Performed by: ANESTHESIOLOGY

## 2017-09-20 PROCEDURE — 81025 URINE PREGNANCY TEST: CPT | Performed by: ANESTHESIOLOGY

## 2017-09-20 PROCEDURE — 40000277 XR SURGERY CARM FLUORO LESS THAN 5 MIN W STILLS: Mod: TC

## 2017-09-20 PROCEDURE — 22845 INSERT SPINE FIXATION DEVICE: CPT | Mod: 59 | Performed by: NEUROLOGICAL SURGERY

## 2017-09-20 DEVICE — IMPLANTABLE DEVICE: Type: IMPLANTABLE DEVICE | Site: SPINE CERVICAL | Status: FUNCTIONAL

## 2017-09-20 DEVICE — GRAFT BONE PUTTY DBX 01ML 038010: Type: IMPLANTABLE DEVICE | Site: SPINE CERVICAL | Status: FUNCTIONAL

## 2017-09-20 RX ORDER — DULOXETIN HYDROCHLORIDE 30 MG/1
30 CAPSULE, DELAYED RELEASE ORAL 2 TIMES DAILY
Status: DISCONTINUED | OUTPATIENT
Start: 2017-09-20 | End: 2017-09-21 | Stop reason: HOSPADM

## 2017-09-20 RX ORDER — SODIUM CHLORIDE AND POTASSIUM CHLORIDE 150; 900 MG/100ML; MG/100ML
INJECTION, SOLUTION INTRAVENOUS CONTINUOUS
Status: DISCONTINUED | OUTPATIENT
Start: 2017-09-20 | End: 2017-09-21 | Stop reason: HOSPADM

## 2017-09-20 RX ORDER — LIDOCAINE 40 MG/G
CREAM TOPICAL
Status: DISCONTINUED | OUTPATIENT
Start: 2017-09-20 | End: 2017-09-20 | Stop reason: HOSPADM

## 2017-09-20 RX ORDER — CEFAZOLIN SODIUM 2 G/100ML
2 INJECTION, SOLUTION INTRAVENOUS
Status: COMPLETED | OUTPATIENT
Start: 2017-09-20 | End: 2017-09-20

## 2017-09-20 RX ORDER — LIDOCAINE 40 MG/G
CREAM TOPICAL
Status: DISCONTINUED | OUTPATIENT
Start: 2017-09-20 | End: 2017-09-21 | Stop reason: HOSPADM

## 2017-09-20 RX ORDER — SIMVASTATIN 40 MG
40 TABLET ORAL AT BEDTIME
Status: DISCONTINUED | OUTPATIENT
Start: 2017-09-20 | End: 2017-09-21 | Stop reason: HOSPADM

## 2017-09-20 RX ORDER — ACETAMINOPHEN 325 MG/1
650 TABLET ORAL EVERY 4 HOURS PRN
Status: DISCONTINUED | OUTPATIENT
Start: 2017-09-23 | End: 2017-09-21 | Stop reason: HOSPADM

## 2017-09-20 RX ORDER — LEVOTHYROXINE SODIUM 100 UG/1
100 TABLET ORAL DAILY
Status: DISCONTINUED | OUTPATIENT
Start: 2017-09-20 | End: 2017-09-21 | Stop reason: HOSPADM

## 2017-09-20 RX ORDER — OXYCODONE HYDROCHLORIDE 5 MG/1
5-10 TABLET ORAL
Status: DISCONTINUED | OUTPATIENT
Start: 2017-09-20 | End: 2017-09-21 | Stop reason: HOSPADM

## 2017-09-20 RX ORDER — SODIUM CHLORIDE, SODIUM LACTATE, POTASSIUM CHLORIDE, CALCIUM CHLORIDE 600; 310; 30; 20 MG/100ML; MG/100ML; MG/100ML; MG/100ML
INJECTION, SOLUTION INTRAVENOUS CONTINUOUS
Status: DISCONTINUED | OUTPATIENT
Start: 2017-09-20 | End: 2017-09-20 | Stop reason: HOSPADM

## 2017-09-20 RX ORDER — PROCHLORPERAZINE MALEATE 5 MG
5-10 TABLET ORAL EVERY 6 HOURS PRN
Status: DISCONTINUED | OUTPATIENT
Start: 2017-09-20 | End: 2017-09-21 | Stop reason: HOSPADM

## 2017-09-20 RX ORDER — HYDROMORPHONE HYDROCHLORIDE 2 MG/1
2-4 TABLET ORAL
Status: DISCONTINUED | OUTPATIENT
Start: 2017-09-20 | End: 2017-09-21 | Stop reason: ALTCHOICE

## 2017-09-20 RX ORDER — DOCUSATE SODIUM 100 MG/1
100 CAPSULE, LIQUID FILLED ORAL 2 TIMES DAILY
Status: DISCONTINUED | OUTPATIENT
Start: 2017-09-20 | End: 2017-09-21 | Stop reason: HOSPADM

## 2017-09-20 RX ORDER — DEXAMETHASONE SODIUM PHOSPHATE 4 MG/ML
INJECTION, SOLUTION INTRA-ARTICULAR; INTRALESIONAL; INTRAMUSCULAR; INTRAVENOUS; SOFT TISSUE PRN
Status: DISCONTINUED | OUTPATIENT
Start: 2017-09-20 | End: 2017-09-20

## 2017-09-20 RX ORDER — ACETAMINOPHEN 325 MG/1
975 TABLET ORAL EVERY 8 HOURS
Status: DISCONTINUED | OUTPATIENT
Start: 2017-09-20 | End: 2017-09-21 | Stop reason: HOSPADM

## 2017-09-20 RX ORDER — DIMENHYDRINATE 50 MG/ML
25 INJECTION, SOLUTION INTRAMUSCULAR; INTRAVENOUS
Status: DISCONTINUED | OUTPATIENT
Start: 2017-09-20 | End: 2017-09-20 | Stop reason: HOSPADM

## 2017-09-20 RX ORDER — KETAMINE HYDROCHLORIDE 10 MG/ML
INJECTION, SOLUTION INTRAMUSCULAR; INTRAVENOUS PRN
Status: DISCONTINUED | OUTPATIENT
Start: 2017-09-20 | End: 2017-09-20

## 2017-09-20 RX ORDER — FENTANYL CITRATE 50 UG/ML
INJECTION, SOLUTION INTRAMUSCULAR; INTRAVENOUS PRN
Status: DISCONTINUED | OUTPATIENT
Start: 2017-09-20 | End: 2017-09-20

## 2017-09-20 RX ORDER — ONDANSETRON 4 MG/1
4 TABLET, ORALLY DISINTEGRATING ORAL EVERY 30 MIN PRN
Status: DISCONTINUED | OUTPATIENT
Start: 2017-09-20 | End: 2017-09-20 | Stop reason: HOSPADM

## 2017-09-20 RX ORDER — LABETALOL HYDROCHLORIDE 5 MG/ML
10 INJECTION, SOLUTION INTRAVENOUS
Status: DISCONTINUED | OUTPATIENT
Start: 2017-09-20 | End: 2017-09-20 | Stop reason: HOSPADM

## 2017-09-20 RX ORDER — ONDANSETRON 4 MG/1
4 TABLET, ORALLY DISINTEGRATING ORAL EVERY 6 HOURS PRN
Status: DISCONTINUED | OUTPATIENT
Start: 2017-09-20 | End: 2017-09-21 | Stop reason: HOSPADM

## 2017-09-20 RX ORDER — PROPOFOL 10 MG/ML
INJECTION, EMULSION INTRAVENOUS PRN
Status: DISCONTINUED | OUTPATIENT
Start: 2017-09-20 | End: 2017-09-20

## 2017-09-20 RX ORDER — ACETAMINOPHEN 325 MG/1
975 TABLET ORAL EVERY 8 HOURS
Status: DISCONTINUED | OUTPATIENT
Start: 2017-09-20 | End: 2017-09-20

## 2017-09-20 RX ORDER — METOCLOPRAMIDE HYDROCHLORIDE 5 MG/ML
10 INJECTION INTRAMUSCULAR; INTRAVENOUS EVERY 6 HOURS PRN
Status: DISCONTINUED | OUTPATIENT
Start: 2017-09-20 | End: 2017-09-21 | Stop reason: HOSPADM

## 2017-09-20 RX ORDER — NEOSTIGMINE METHYLSULFATE 1 MG/ML
VIAL (ML) INJECTION PRN
Status: DISCONTINUED | OUTPATIENT
Start: 2017-09-20 | End: 2017-09-20

## 2017-09-20 RX ORDER — HYDRALAZINE HYDROCHLORIDE 20 MG/ML
2.5-5 INJECTION INTRAMUSCULAR; INTRAVENOUS EVERY 10 MIN PRN
Status: DISCONTINUED | OUTPATIENT
Start: 2017-09-20 | End: 2017-09-20 | Stop reason: HOSPADM

## 2017-09-20 RX ORDER — LISINOPRIL 40 MG/1
40 TABLET ORAL DAILY
Status: DISCONTINUED | OUTPATIENT
Start: 2017-09-20 | End: 2017-09-21 | Stop reason: HOSPADM

## 2017-09-20 RX ORDER — LIDOCAINE HYDROCHLORIDE 10 MG/ML
INJECTION, SOLUTION INFILTRATION; PERINEURAL PRN
Status: DISCONTINUED | OUTPATIENT
Start: 2017-09-20 | End: 2017-09-20

## 2017-09-20 RX ORDER — CEFAZOLIN SODIUM 2 G/100ML
2 INJECTION, SOLUTION INTRAVENOUS EVERY 8 HOURS
Status: COMPLETED | OUTPATIENT
Start: 2017-09-20 | End: 2017-09-21

## 2017-09-20 RX ORDER — ONDANSETRON 2 MG/ML
4 INJECTION INTRAMUSCULAR; INTRAVENOUS EVERY 6 HOURS PRN
Status: DISCONTINUED | OUTPATIENT
Start: 2017-09-20 | End: 2017-09-21 | Stop reason: HOSPADM

## 2017-09-20 RX ORDER — DEXAMETHASONE SODIUM PHOSPHATE 4 MG/ML
4 INJECTION, SOLUTION INTRA-ARTICULAR; INTRALESIONAL; INTRAMUSCULAR; INTRAVENOUS; SOFT TISSUE EVERY 6 HOURS
Status: COMPLETED | OUTPATIENT
Start: 2017-09-20 | End: 2017-09-20

## 2017-09-20 RX ORDER — BUPIVACAINE HYDROCHLORIDE AND EPINEPHRINE 5; 5 MG/ML; UG/ML
INJECTION, SOLUTION PERINEURAL PRN
Status: DISCONTINUED | OUTPATIENT
Start: 2017-09-20 | End: 2017-09-20 | Stop reason: HOSPADM

## 2017-09-20 RX ORDER — FENTANYL CITRATE 50 UG/ML
25-50 INJECTION, SOLUTION INTRAMUSCULAR; INTRAVENOUS
Status: DISCONTINUED | OUTPATIENT
Start: 2017-09-20 | End: 2017-09-20 | Stop reason: HOSPADM

## 2017-09-20 RX ORDER — DIPHENHYDRAMINE HYDROCHLORIDE 50 MG/ML
25 INJECTION INTRAMUSCULAR; INTRAVENOUS EVERY 6 HOURS PRN
Status: DISCONTINUED | OUTPATIENT
Start: 2017-09-20 | End: 2017-09-21 | Stop reason: HOSPADM

## 2017-09-20 RX ORDER — METOCLOPRAMIDE 10 MG/1
10 TABLET ORAL EVERY 6 HOURS PRN
Status: DISCONTINUED | OUTPATIENT
Start: 2017-09-20 | End: 2017-09-21 | Stop reason: HOSPADM

## 2017-09-20 RX ORDER — DIAZEPAM 5 MG
5 TABLET ORAL EVERY 6 HOURS PRN
Status: DISCONTINUED | OUTPATIENT
Start: 2017-09-20 | End: 2017-09-21 | Stop reason: HOSPADM

## 2017-09-20 RX ORDER — CEFAZOLIN SODIUM 1 G/3ML
1 INJECTION, POWDER, FOR SOLUTION INTRAMUSCULAR; INTRAVENOUS SEE ADMIN INSTRUCTIONS
Status: DISCONTINUED | OUTPATIENT
Start: 2017-09-20 | End: 2017-09-20 | Stop reason: HOSPADM

## 2017-09-20 RX ORDER — ONDANSETRON 2 MG/ML
INJECTION INTRAMUSCULAR; INTRAVENOUS PRN
Status: DISCONTINUED | OUTPATIENT
Start: 2017-09-20 | End: 2017-09-20

## 2017-09-20 RX ORDER — NALOXONE HYDROCHLORIDE 0.4 MG/ML
.1-.4 INJECTION, SOLUTION INTRAMUSCULAR; INTRAVENOUS; SUBCUTANEOUS
Status: DISCONTINUED | OUTPATIENT
Start: 2017-09-20 | End: 2017-09-21 | Stop reason: HOSPADM

## 2017-09-20 RX ORDER — ACETAMINOPHEN 325 MG/1
650 TABLET ORAL EVERY 4 HOURS PRN
Status: DISCONTINUED | OUTPATIENT
Start: 2017-09-23 | End: 2017-09-20

## 2017-09-20 RX ORDER — ONDANSETRON 2 MG/ML
4 INJECTION INTRAMUSCULAR; INTRAVENOUS EVERY 30 MIN PRN
Status: DISCONTINUED | OUTPATIENT
Start: 2017-09-20 | End: 2017-09-20 | Stop reason: HOSPADM

## 2017-09-20 RX ORDER — PROPOFOL 10 MG/ML
INJECTION, EMULSION INTRAVENOUS CONTINUOUS PRN
Status: DISCONTINUED | OUTPATIENT
Start: 2017-09-20 | End: 2017-09-20

## 2017-09-20 RX ORDER — GLYCOPYRROLATE 0.2 MG/ML
INJECTION, SOLUTION INTRAMUSCULAR; INTRAVENOUS PRN
Status: DISCONTINUED | OUTPATIENT
Start: 2017-09-20 | End: 2017-09-20

## 2017-09-20 RX ORDER — DIPHENHYDRAMINE HCL 25 MG
25 CAPSULE ORAL EVERY 6 HOURS PRN
Status: DISCONTINUED | OUTPATIENT
Start: 2017-09-20 | End: 2017-09-21 | Stop reason: HOSPADM

## 2017-09-20 RX ADMIN — DOCUSATE SODIUM 100 MG: 100 CAPSULE, LIQUID FILLED ORAL at 20:12

## 2017-09-20 RX ADMIN — FENTANYL CITRATE 25 MCG: 50 INJECTION INTRAMUSCULAR; INTRAVENOUS at 10:05

## 2017-09-20 RX ADMIN — DEXAMETHASONE SODIUM PHOSPHATE 4 MG: 4 INJECTION, SOLUTION INTRA-ARTICULAR; INTRALESIONAL; INTRAMUSCULAR; INTRAVENOUS; SOFT TISSUE at 07:36

## 2017-09-20 RX ADMIN — PROPOFOL 200 MG: 10 INJECTION, EMULSION INTRAVENOUS at 07:36

## 2017-09-20 RX ADMIN — GLYCOPYRROLATE 0.2 MG: 0.2 INJECTION, SOLUTION INTRAMUSCULAR; INTRAVENOUS at 07:36

## 2017-09-20 RX ADMIN — SODIUM CHLORIDE, POTASSIUM CHLORIDE, SODIUM LACTATE AND CALCIUM CHLORIDE: 600; 310; 30; 20 INJECTION, SOLUTION INTRAVENOUS at 07:00

## 2017-09-20 RX ADMIN — PROCHLORPERAZINE EDISYLATE 5 MG: 5 INJECTION INTRAMUSCULAR; INTRAVENOUS at 17:27

## 2017-09-20 RX ADMIN — HYDROMORPHONE HYDROCHLORIDE 1 MG: 1 INJECTION, SOLUTION INTRAMUSCULAR; INTRAVENOUS; SUBCUTANEOUS at 07:52

## 2017-09-20 RX ADMIN — ONDANSETRON 4 MG: 2 INJECTION INTRAMUSCULAR; INTRAVENOUS at 07:36

## 2017-09-20 RX ADMIN — ROCURONIUM BROMIDE 35 MG: 10 INJECTION INTRAVENOUS at 07:36

## 2017-09-20 RX ADMIN — KETAMINE HCL-NACL SOLN PREF SY 50 MG/5ML-0.9% (10MG/ML) 50 MG: 10 SOLUTION PREFILLED SYRINGE at 07:55

## 2017-09-20 RX ADMIN — BUPIVACAINE HYDROCHLORIDE AND EPINEPHRINE BITARTRATE 2 ML: 5; .005 INJECTION, SOLUTION EPIDURAL; INTRACAUDAL; PERINEURAL at 08:46

## 2017-09-20 RX ADMIN — LIDOCAINE HYDROCHLORIDE 50 MG: 10 INJECTION, SOLUTION INFILTRATION; PERINEURAL at 07:36

## 2017-09-20 RX ADMIN — SODIUM CHLORIDE, POTASSIUM CHLORIDE, SODIUM LACTATE AND CALCIUM CHLORIDE: 600; 310; 30; 20 INJECTION, SOLUTION INTRAVENOUS at 08:49

## 2017-09-20 RX ADMIN — LISINOPRIL 40 MG: 40 TABLET ORAL at 20:11

## 2017-09-20 RX ADMIN — LEVOTHYROXINE SODIUM 100 MCG: 100 TABLET ORAL at 20:12

## 2017-09-20 RX ADMIN — DEXAMETHASONE SODIUM PHOSPHATE 4 MG: 4 INJECTION, SOLUTION INTRAMUSCULAR; INTRAVENOUS at 17:18

## 2017-09-20 RX ADMIN — POTASSIUM CHLORIDE AND SODIUM CHLORIDE: 900; 150 INJECTION, SOLUTION INTRAVENOUS at 14:28

## 2017-09-20 RX ADMIN — PROPOFOL 100 MCG/KG/MIN: 10 INJECTION, EMULSION INTRAVENOUS at 07:55

## 2017-09-20 RX ADMIN — THROMBIN HUMAN 1 KIT: 2000 POWDER, FOR SOLUTION TOPICAL at 08:46

## 2017-09-20 RX ADMIN — CEFAZOLIN SODIUM 2 G: 2 INJECTION, SOLUTION INTRAVENOUS at 23:56

## 2017-09-20 RX ADMIN — FENTANYL CITRATE 100 MCG: 50 INJECTION, SOLUTION INTRAMUSCULAR; INTRAVENOUS at 07:34

## 2017-09-20 RX ADMIN — DEXAMETHASONE SODIUM PHOSPHATE 4 MG: 4 INJECTION, SOLUTION INTRAMUSCULAR; INTRAVENOUS at 23:57

## 2017-09-20 RX ADMIN — HYDROMORPHONE HYDROCHLORIDE 1 MG: 1 INJECTION, SOLUTION INTRAMUSCULAR; INTRAVENOUS; SUBCUTANEOUS at 08:04

## 2017-09-20 RX ADMIN — ONDANSETRON 4 MG: 4 TABLET, ORALLY DISINTEGRATING ORAL at 14:52

## 2017-09-20 RX ADMIN — SIMVASTATIN 40 MG: 40 TABLET, FILM COATED ORAL at 21:27

## 2017-09-20 RX ADMIN — CEFAZOLIN SODIUM 2 G: 2 INJECTION, SOLUTION INTRAVENOUS at 07:44

## 2017-09-20 RX ADMIN — MIDAZOLAM HYDROCHLORIDE 2 MG: 1 INJECTION, SOLUTION INTRAMUSCULAR; INTRAVENOUS at 07:32

## 2017-09-20 RX ADMIN — Medication 2 MG: at 08:53

## 2017-09-20 RX ADMIN — ACETAMINOPHEN 975 MG: 325 TABLET, FILM COATED ORAL at 20:11

## 2017-09-20 RX ADMIN — GENTAMICIN SULFATE 1000 ML: 40 INJECTION, SOLUTION INTRAMUSCULAR; INTRAVENOUS at 08:46

## 2017-09-20 RX ADMIN — DULOXETINE HYDROCHLORIDE 30 MG: 30 CAPSULE, DELAYED RELEASE PELLETS ORAL at 20:12

## 2017-09-20 RX ADMIN — CEFAZOLIN SODIUM 2 G: 2 INJECTION, SOLUTION INTRAVENOUS at 16:06

## 2017-09-20 RX ADMIN — THROMBIN, TOPICAL (BOVINE) 5000 UNITS: KIT at 08:47

## 2017-09-20 RX ADMIN — GLYCOPYRROLATE 0.4 MG: 0.2 INJECTION, SOLUTION INTRAMUSCULAR; INTRAVENOUS at 08:53

## 2017-09-20 ASSESSMENT — COPD QUESTIONNAIRES: COPD: 0

## 2017-09-20 ASSESSMENT — ENCOUNTER SYMPTOMS
SEIZURES: 0
STRIDOR: 0
DYSRHYTHMIAS: 0

## 2017-09-20 ASSESSMENT — LIFESTYLE VARIABLES: TOBACCO_USE: 0

## 2017-09-20 NOTE — OP NOTE
OPERATIVE NOTE      Pre op Diagnosis:   1. Right C4-5 herniated disk with RUE radiculopathy and weakness  2. Right C5-6 foraminal stenosis with RUE radiculopathy and weakness     Post op Diagnosis: Same    Procedure:   1. C4-5 anterior cervical discectomy with arthrodesis and placement of a 7 mm Nuvasive Corent PEEK interbody graft with putty placed centrally  2. C5-6 anterior cervical discectomy with arthrodesis and placement of a 7 mm Nuvasive Corent PEEK interbody graft with putty placed centrally  3. Placement of a 38 mm Nuvasive Archon anterior cervical plate with six 15 mm screws  4. Use of C-arm and microscope    Surgeon: Bharathi Salgado MD    Assistant: Cruz Villavicencio      Indication for procedure: The patient is a 47 year old female that presented with RUE radiculopathy and weakness. After reviewing the patient's imaging studies and examination, the decision was made to proceed with the above procedure. The patient understood the risks and benefits of surgery and wanted to proceed.    Description of Procedure: The patient was seen in the pre op area and the procedure was discussed with the patient and friend once again and all questions were answered. The consent was then signed and the patient's neck was marked with a marker. The patient was then transferred to the operating suite on a stretcher and received general endotracheal anesthesia. She was then placed on the operating table in the supine position with all pressure points padded. A small roll was placed under his shoulders for slight extension. Next, the C-arm fluoroscopy was brought in the operating room for localization of the C4-6 disk spaces. Next, the patient's neck was then prepped and draped in a normal sterile fashion and a transverse incision was marked along the C4-6 interspaces. Next, local anesthesia was placed along the planned incision and a 10 blade scalpel was used to make the incision. The platysma muscle was then identified,  undermined and incised with the Metzenbaum scissors. The Weitlaner retractors were used to retract the platysma muscle and the skin edges. A dissection plane was then made with both sharp and blunt dissection medical to the sternocliedomastoid muscle and the carotid artery down to the prevertebral fascia. The esophagus and trachea were then retracted laterally with the Cloward retractor and the prevertebral fascia was clean with Kitners.  A spinal need was placed in the C5-6 interspace and verified with C-arm fluoroscopy. The longus coli muscles were then elevated with the bovie cautery and the  retractor was placed under the muscle. The C4-5 and C5-6 interspaces were incised with the bovie cautery and the disk was then removed with the Midas Kip drill down to the posterior longitudinal ligament. The ligaments were then penetrated with a microball hook and the Kerrison rongeur was used to remove the posterior longitudinal ligament. All foramen were decompressed and the exiting nerve roots were decompressed and verified by with the microball hook. A 7 trial was placed in the both the C4-5 and C5-6 interspaces and noted to be the appropriate size, therefore, two 7 mm interbody graft was placed in the C4-5 and C5-6 interspaces under fluoroscopic guidance and noted to have an appropriate fit. Next, the size 38 mm anterior cervical plate was secured from C4 to C6 with six 15 mm screws which were locked in placed with the hand held locking bit. The wound was then copiously irrigated and hemostasis was obtained with bipolar cautery, gelfoam and Surgiflo. A CANDIE drain was placed in the operative bed and the retractors were removed and there was no bleeding or injury to the carotid artery. The wound was irrigated once again and the platysma muscle was closed with 2-0 vicryl and the skin edges were closed with 3-0 vicryl and dermabond. The wound was dressed appropriately and the patient was then extubated and  transferred to recovery.    At the end of the case all counts were correct    Complications: None    Estimated blood loss: 20 ml    IV Fluid: See Anesthesia    The patient received Ancef preoperatively      Bharathi Salgado MD, MS, FAANS

## 2017-09-20 NOTE — IP AVS SNAPSHOT
MRN:[id number]                      After Visit Summary   9/20/2017    Natali Nicholson    MRN: [id number]           Thank you!     Thank you for choosing [facility] for your care. Our goal is always to provide you with excellent care. Hearing back from our patients is one way we can continue to improve our services. Please take a few minutes to complete the written survey that you may receive in the mail after you visit. If you would like to speak to someone directly about your visit please contact Patient Relations at [phone]. Thank you!          Patient Information     Date Of Birth          1970        Designated Caregiver       Most Recent Value    Caregiver    Will someone help with your care after discharge? yes    Name of designated caregiver Laly (friend)    Phone number of caregiver [phone]    Caregiver address [address] [address] [facility]       About your hospital stay     You were admitted on:  September 20, 2017 You last received care in the:  [facility]    You were discharged on:  September 21, 2017        Reason for your hospital stay       You were in the hospital for a C4-6 ACDF                  Who to Call     For medical emergencies, please call 911.  For non-urgent questions about your medical care, please call your primary care provider or clinic, [phone]  For questions related to your surgery, please call your surgery clinic        Attending Provider     Provider Specialty    Bharathi Salgado MD Neurosurgery       Primary Care Provider Office Phone # Fax #    Pablo Gomez [phone]      After Care Instructions     Activity       Your activity upon discharge: Discharge instructions:  No lifting of more than 10 pounds, no bending, no twisting until follow up visit.  Wear brace when out of bed as needed for 2 weeks     Ok to shampoo hair, shower or bathe, but, do not scrub or submerge incision  under water until evaluated post-operatively in clinic.    Ok to walk as tolerated, avoid bed rest and prolonged sitting.    No contact sports until after follow up visit  No high impact activities such as; running/jogging, snowmobile or 4 paiz riding or any other recreational vehicles.    Do not take NSAIDS (ibuprofen, advil, aleve, naproxen, etc) for pain control.    Do NOT drive while taking narcotic pain medication.    Call the [facility] at [phone] for increasing redness, swelling or pus draining from the incision, increased pain or any other questions and concerns.            Diet       Follow this diet upon discharge: regular diet            Wound care and dressings       Instructions to care for your wound at home: Keep your incision clean and dry at all times.  OK to remove dressing on postop day 2.  OK to shower on postop day 3 and allow water to run over incision, pat dry after shower.  No bathing swimming or submerging in water.  Call immediately or come to ED if any drainage occurs, or you develop new pain, redness, or swelling.                  Follow-up Appointments     Follow-up and recommended labs and tests        Please follow up at the [facility] in 6 weeks with a cervical xray prior.   Please call the clinic at [phone] to schedule your appointment with Laurel Pryor CNP or Zoe Amaro CNP                  Future tests that were ordered for you     X-ray Cervical spine 2-3 vws                 Further instructions from your care team       May restart Aspirin 81 mg per day on 9-    Take Flomax 0.4mg one per day only until you are able to urinate     Pending Results     No orders found for last 3 day(s).            Statement of Approval     Ordered          09/21/17 0826  I have reviewed and agree with all the recommendations and orders detailed in this document.  EFFECTIVE NOW     Approved and electronically signed by:  Laurel Pryor APRN CNP  "            Admission Information     Date & Time Provider Department Dept. Phone    9/20/2017 Bharathi Salgado MD [facility] [phone]      Your Vitals Were     Blood Pressure Temperature Respirations Height Weight Last Period    127/68 (BP Location: Left arm) 96.8  F (36  C) (Oral) 16 1.626 m (5' 4\") 93 kg (205 lb) 08/21/2017 (Within Days)    Pulse Oximetry BMI (Body Mass Index)                95% 35.19 kg/m2          MyChart Information     [employer] gives you secure access to your electronic health record. If you see a primary care provider, you can also send messages to your care team and make appointments. If you have questions, please call your primary care clinic.  If you do not have a primary care provider, please call [phone] and they will assist you.        Care EveryWhere ID     This is your Care EveryWhere ID. This could be used by other organizations to access your [place] medical records  [id number]        Equal Access to Services     HERIBERTO MELÉNDEZ : Hadii aad ku hadasho Soconsueloali, waaxda luqadaha, qaybta kaalmada adeegyada, tracy simmons . So [facility] [phone].    ATENCIÓN: Si habla español, tiene a crawley disposición servicios gratuitos de asistencia lingüística. Llame al [phone].    We comply with applicable federal civil rights laws and [place] laws. We do not discriminate on the basis of race, color, national origin, age, disability sex, sexual orientation or gender identity.               Review of your medicines      START taking        Dose / Directions    diazepam 5 MG tablet   Commonly known as:  VALIUM   Notes to Patient:  Available at any time        Dose:  5 mg   Take 1 tablet (5 mg) by mouth every 6 hours as needed for other (muscle spasms)   Quantity:  60 tablet   Refills:  0       oxyCODONE 5 MG IR tablet   Commonly known as:  ROXICODONE   Notes to Patient:  Available at any time        Dose:  5-10 mg   Take 1-2 tablets " (5-10 mg) by mouth every 3 hours as needed for moderate to severe pain   Quantity:  45 tablet   Refills:  0       tamsulosin 0.4 MG capsule   Commonly known as:  FLOMAX   Used for:  Urinary retention        Dose:  0.4 mg   Take 1 capsule (0.4 mg) by mouth daily   Quantity:  60 capsule   Refills:  0         CONTINUE these medicines which have NOT CHANGED        Dose / Directions    blood glucose monitoring lancets   Used for:  Type 2 diabetes mellitus without complication, without long-term current use of insulin (H)        Use to test blood sugars 1 times daily or as directed. Reference #[id number]   Quantity:  1 Box   Refills:  3       blood glucose monitoring meter device kit   Used for:  Type 2 diabetes mellitus without complication, without long-term current use of insulin (H)        Use to test blood sugar 1 times daily or as directed.  Reference #[id number]   Quantity:  1 kit   Refills:  0       blood glucose monitoring test strip   Commonly known as:  ONE TOUCH VERIO IQ   Used for:  Type 2 diabetes mellitus without complication, without long-term current use of insulin (H)        Use to test blood sugars 1 times daily or as directed.  Reference #[id number]   Quantity:  100 each   Refills:  3       DULoxetine 30 MG EC capsule   Commonly known as:  CYMBALTA   Used for:  Recurrent major depressive disorder, in full remission (H)        Dose:  30 mg   Take 1 capsule (30 mg) by mouth 2 times daily   Quantity:  180 capsule   Refills:  3       levothyroxine 100 MCG tablet   Commonly known as:  SYNTHROID/LEVOTHROID   Used for:  Hypothyroidism, unspecified type        Dose:  100 mcg   Take 1 tablet (100 mcg) by mouth daily   Quantity:  90 tablet   Refills:  3       lisinopril 40 MG tablet   Commonly known as:  PRINIVIL/ZESTRIL   Used for:  Essential hypertension with goal blood pressure less than 130/80        Dose:  40 mg   Take 1 tablet (40 mg) by mouth daily   Quantity:  90 tablet   Refills:  3       metFORMIN 1000  MG tablet   Commonly known as:  GLUCOPHAGE   Used for:  Type 2 diabetes, HbA1c goal < 7% (H)        Dose:  1000 mg   Take 1 tablet (1,000 mg) by mouth 2 times daily (with meals) with breakfast and dinner.   Quantity:  180 tablet   Refills:  3       simvastatin 40 MG tablet   Commonly known as:  ZOCOR   Used for:  Hyperlipidemia LDL goal <100        Dose:  40 mg   Take 1 tablet (40 mg) by mouth At Bedtime   Quantity:  90 tablet   Refills:  3         STOP taking     aspirin 81 MG tablet                Where to get your medicines      These medications were sent to [place], [place] - [address] [facility]  [address] [facility] [address]     Phone:  [phone]     tamsulosin 0.4 MG capsule         Some of these will need a paper prescription and others can be bought over the counter. Ask your nurse if you have questions.     Bring a paper prescription for each of these medications     diazepam 5 MG tablet    oxyCODONE 5 MG IR tablet                Protect others around you: Learn how to safely use, store and throw away your medicines at www.disposemymeds.org.             Medication List: This is a list of all your medications and when to take them. Check marks below indicate your daily home schedule. Keep this list as a reference.      Medications           Morning Afternoon Evening Bedtime As Needed    blood glucose monitoring lancets   Use to test blood sugars 1 times daily or as directed. Reference #[id number]                                blood glucose monitoring meter device kit   Use to test blood sugar 1 times daily or as directed.  Reference #[id number]                                blood glucose monitoring test strip   Commonly known as:  ONE TOUCH VERIO IQ   Use to test blood sugars 1 times daily or as directed.  Reference #[id number]                                diazepam 5 MG tablet   Commonly known as:  VALIUM   Take 1 tablet (5 mg) by mouth every 6 hours as  needed for other (muscle spasms)   Notes to Patient:  Available at any time                                   DULoxetine 30 MG EC capsule   Commonly known as:  CYMBALTA   Take 1 capsule (30 mg) by mouth 2 times daily   Last time this was given:  30 mg on 9/21/2017  8:46 AM   Next Dose Due:  Tonight                                      levothyroxine 100 MCG tablet   Commonly known as:  SYNTHROID/LEVOTHROID   Take 1 tablet (100 mcg) by mouth daily   Last time this was given:  100 mcg on 9/21/2017  8:46 AM   Next Dose Due:  Friday                                   lisinopril 40 MG tablet   Commonly known as:  PRINIVIL/ZESTRIL   Take 1 tablet (40 mg) by mouth daily   Last time this was given:  40 mg on 9/21/2017  8:46 AM   Next Dose Due:  Friday                                   metFORMIN 1000 MG tablet   Commonly known as:  GLUCOPHAGE   Take 1 tablet (1,000 mg) by mouth 2 times daily (with meals) with breakfast and dinner.   Next Dose Due:  Friday                                      oxyCODONE 5 MG IR tablet   Commonly known as:  ROXICODONE   Take 1-2 tablets (5-10 mg) by mouth every 3 hours as needed for moderate to severe pain   Notes to Patient:  Available at any time                                   simvastatin 40 MG tablet   Commonly known as:  ZOCOR   Take 1 tablet (40 mg) by mouth At Bedtime   Last time this was given:  40 mg on 9/20/2017  9:27 PM   Next Dose Due:  Tonight                                   tamsulosin 0.4 MG capsule   Commonly known as:  FLOMAX   Take 1 capsule (0.4 mg) by mouth daily   Last time this was given:  0.4 mg on 9/21/2017  8:46 AM   Next Dose Due:  Friday

## 2017-09-20 NOTE — PHARMACY-ADMISSION MEDICATION HISTORY
Admission medication history interview status for this patient is complete. See [facility] admission navigator for allergy information, prior to admission medications and immunization status.     PTA meds completed by pre-admitting nurse Tatiana Barraza and reviewed by pharmacy       Prior to Admission medications    Medication Sig Last Dose Taking? Auth Provider   levothyroxine (SYNTHROID/LEVOTHROID) 100 MCG tablet Take 1 tablet (100 mcg) by mouth daily 9/19/2017 at Unknown time Yes Pablo Gomez MD   DULoxetine (CYMBALTA) 30 MG capsule Take 1 capsule (30 mg) by mouth 2 times daily 9/20/2017 at Unknown time Yes Pablo Gomez MD   simvastatin (ZOCOR) 40 MG tablet Take 1 tablet (40 mg) by mouth At Bedtime 9/19/2017 at Unknown time Yes Pablo Gomez MD   metFORMIN (GLUCOPHAGE) 1000 MG tablet Take 1 tablet (1,000 mg) by mouth 2 times daily (with meals) with breakfast and dinner. 9/20/2017 at Unknown time Yes Pablo Gomez MD   lisinopril (PRINIVIL,ZESTRIL) 40 MG tablet Take 1 tablet (40 mg) by mouth daily 9/19/2017 at Unknown time Yes Pablo Gomez MD   ASPIRIN 81 MG OR TABS ONE DAILY Past Week at Unknown time Yes Pablo Gomez MD   blood glucose monitoring (ONETOUCH VERIO) meter device kit Use to test blood sugar 1 times daily or as directed.  Reference #[id number]   Pablo Gomez MD   blood glucose monitoring (ONE TOUCH VERIO IQ) test strip Use to test blood sugars 1 times daily or as directed.  Reference #[id number]   Pablo Gomez MD   blood glucose monitoring (ONE TOUCH DELICA) lancets Use to test blood sugars 1 times daily or as directed. Reference #[id number]   Pablo Gomez MD

## 2017-09-20 NOTE — ANESTHESIA POSTPROCEDURE EVALUATION
Patient: Natali Nicholson    Procedure(s):  C4-6 Anterior Cervical Discectomy and Fusion  - Wound Class: I-Clean    Diagnosis:Herniated Disc. Stenosis, Upper Extremity Weakness   Diagnosis Additional Information: 1. Right C4-5 herniated disk with RUE radiculopathy and weakness  2. Right C5-6 foraminal stenosis with RUE radiculopathy and weakness     Anesthesia Type:  General, ETT    Note:  Anesthesia Post Evaluation    Patient location during evaluation: PACU  Patient participation: Able to fully participate in evaluation  Level of consciousness: awake  Pain management: adequate  Airway patency: patent  Cardiovascular status: acceptable  Respiratory status: acceptable  Hydration status: acceptable  PONV: controlled     Anesthetic complications: None          Last vitals:  Vitals:    09/20/17 1012 09/20/17 1015 09/20/17 1030   BP: (!) 136/91 131/86    Resp: 21 14 17   Temp:      SpO2:  97% 96%         Electronically Signed By: Blue Cuello MD  September 20, 2017  10:49 AM

## 2017-09-20 NOTE — ANESTHESIA PREPROCEDURE EVALUATION
Anesthesia Evaluation     . Pt has had prior anesthetic. Type: MAC    No history of anesthetic complications          ROS/MED HX    ENT/Pulmonary:     (+)MARY risk factors hypertension, obese, , . .   (-) tobacco use, asthma, COPD and recent URI   Neurologic:  - neg neurologic ROS    (-) seizures and CVA   Cardiovascular:     (+) Dyslipidemia, hypertension----. : . . . :. . Previous cardiac testing date:results:date: results:ECG reviewed date:9/20/17 results:NSR.  Late transition. date: results:         (-) CAD, arrhythmias and valvular problems/murmurs   METS/Exercise Tolerance:     Hematologic: Comments: Lab Test        09/12/17                       1018          WBC          8.4           HGB          13.3          MCV          88            PLT          283            Lab Test        09/20/17     08/15/17     08/09/16     12/16/14                       0630          1001          1353          0908          NA            --          136          137          135           POTASSIUM    3.8          3.9          3.8          3.7           CHLORIDE      --          103          105          103           CO2           --          28           28           25            BUN           --          9            9            10            CR           0.66         0.81         0.70         0.68          ANIONGAP      --          5            4            7             ZOHRA           --          8.8          8.9          8.8           GLC           --          134*         106*         117*             this AM - neg hematologic  ROS       Musculoskeletal:   (+) , , other musculoskeletal- DDD      GI/Hepatic:  - neg GI/hepatic ROS      (-) GERD, hepatitis and liver disease   Renal/Genitourinary:  - ROS Renal section negative       Endo:     (+) type II DM Not using insulin - not using insulin pump thyroid problem hypothyroidism, Obesity, .   (-) Type I DM and chronic steroid usage   Psychiatric:     (+) psychiatric  history depression      Infectious Disease:  - neg infectious disease ROS       Malignancy:      - no malignancy   Other:    - neg other ROS                 Physical Exam  Normal systems: cardiovascular, pulmonary and dental    Airway   Mallampati: II  TM distance: >3 FB  Neck ROM: full    Dental     Cardiovascular   Rhythm and rate: regular and normal  (-) no friction rub, no systolic click and no murmur    Pulmonary    breath sounds clear to auscultation(-) no rhonchi, no decreased breath sounds, no wheezes, no rales and no stridor                    Anesthesia Plan      History & Physical Review  History and physical reviewed and following examination; no interval change.    ASA Status:  3 .    NPO Status:  > 8 hours    Plan for General and ETT with Intravenous induction. Maintenance will be Balanced.    PONV prophylaxis:  Ondansetron (or other 5HT-3) and Dexamethasone or Solumedrol  Additional equipment: Videolaryngoscope      Postoperative Care  Postoperative pain management:  IV analgesics.      Consents  Anesthetic plan, risks, benefits and alternatives discussed with:  Patient or representative and Patient..                          .

## 2017-09-20 NOTE — ANESTHESIA CARE TRANSFER NOTE
Patient: Natali Nicholson    Procedure(s):  C4-6 Anterior Cervical Discectomy and Fusion  - Wound Class: I-Clean    Diagnosis: Herniated Disc. Stenosis, Upper Extremity Weakness   Diagnosis Additional Information: No value filed.    Anesthesia Type:   General, ETT     Note:  Airway :Face Mask  Patient transferred to:PACU  Comments: Report and sign off to RN in PACU.  Good resps, skin pink, monitors on, VSS,  O2 via Face Tent.      Vitals: (Last set prior to Anesthesia Care Transfer)    CRNA VITALS  9/20/2017 0839 - 9/20/2017 0919      9/20/2017             NIBP: 125/81    Pulse: 80    NIBP Mean: 98    SpO2: 100 %    Resp Rate (observed): 8    EKG: NSR                Electronically Signed By: JES Haile CRNA  September 20, 2017  9:19 AM

## 2017-09-20 NOTE — IP AVS SNAPSHOT
[facility]    [address]    [facility] [address]    Phone:  [phone]    Fax:  [phone]                                       After Visit Summary   9/20/2017    Natali Nicholsno    MRN: [id number]           After Visit Summary Signature Page     I have received my discharge instructions, and my questions have been answered. I have discussed any challenges I see with this plan with the nurse or doctor.    ..........................................................................................................................................  Patient/Patient Representative Signature      ..........................................................................................................................................  Patient Representative Print Name and Relationship to Patient    ..................................................               ................................................  Date                                            Time    ..........................................................................................................................................  Reviewed by Signature/Title    ...................................................              ..............................................  Date                                                            Time

## 2017-09-20 NOTE — PROGRESS NOTES
Pt arrived to the floor at 13:45. Pt is alert and oriented.  Pt denies pain .  Pt has a brock drain and cervical collar on.  pts cms is intact. Pt is able to raise her right arm. Pt states prior to surgery pt unable to raise right  Arm.  Pt is due to void.  Pt was bladder scanned at 12:25 for 225cc.  Pt was up to commode in room and was unable to void.  Pt would like to wait before she is bladder scanned.  Pt was having nausea when up, but resolved once pt was in bed.  Pt declined antiemetics. Pt was settled and post op vitals started.

## 2017-09-21 ENCOUNTER — APPOINTMENT (OUTPATIENT)
Dept: PHYSICAL THERAPY | Facility: CLINIC | Age: 47
DRG: 472 | End: 2017-09-21
Attending: NEUROLOGICAL SURGERY
Payer: COMMERCIAL

## 2017-09-21 VITALS
RESPIRATION RATE: 16 BRPM | BODY MASS INDEX: 35 KG/M2 | WEIGHT: 205 LBS | OXYGEN SATURATION: 95 % | DIASTOLIC BLOOD PRESSURE: 68 MMHG | SYSTOLIC BLOOD PRESSURE: 127 MMHG | TEMPERATURE: 96.8 F | HEIGHT: 64 IN

## 2017-09-21 LAB
GLUCOSE BLDC GLUCOMTR-MCNC: 157 MG/DL (ref 70–99)
INTERPRETATION ECG - MUSE: NORMAL

## 2017-09-21 PROCEDURE — 40000193 ZZH STATISTIC PT WARD VISIT: Performed by: PHYSICAL THERAPIST

## 2017-09-21 PROCEDURE — 25000132 ZZH RX MED GY IP 250 OP 250 PS 637: Performed by: NURSE PRACTITIONER

## 2017-09-21 PROCEDURE — 25000132 ZZH RX MED GY IP 250 OP 250 PS 637: Performed by: NEUROLOGICAL SURGERY

## 2017-09-21 PROCEDURE — 97161 PT EVAL LOW COMPLEX 20 MIN: CPT | Mod: GP | Performed by: PHYSICAL THERAPIST

## 2017-09-21 PROCEDURE — 97116 GAIT TRAINING THERAPY: CPT | Mod: GP | Performed by: PHYSICAL THERAPIST

## 2017-09-21 PROCEDURE — 25000128 H RX IP 250 OP 636: Performed by: NEUROLOGICAL SURGERY

## 2017-09-21 PROCEDURE — 90686 IIV4 VACC NO PRSV 0.5 ML IM: CPT | Performed by: NEUROLOGICAL SURGERY

## 2017-09-21 PROCEDURE — 97530 THERAPEUTIC ACTIVITIES: CPT | Mod: GP | Performed by: PHYSICAL THERAPIST

## 2017-09-21 PROCEDURE — 00000146 ZZHCL STATISTIC GLUCOSE BY METER IP

## 2017-09-21 RX ORDER — TAMSULOSIN HYDROCHLORIDE 0.4 MG/1
0.4 CAPSULE ORAL DAILY
Qty: 60 CAPSULE | Refills: 0 | Status: SHIPPED | OUTPATIENT
Start: 2017-09-21 | End: 2017-10-06

## 2017-09-21 RX ORDER — TAMSULOSIN HYDROCHLORIDE 0.4 MG/1
0.4 CAPSULE ORAL DAILY
Status: DISCONTINUED | OUTPATIENT
Start: 2017-09-21 | End: 2017-09-21 | Stop reason: HOSPADM

## 2017-09-21 RX ORDER — DIAZEPAM 5 MG
5 TABLET ORAL EVERY 6 HOURS PRN
Qty: 60 TABLET | Refills: 0 | Status: SHIPPED | OUTPATIENT
Start: 2017-09-21 | End: 2017-10-06

## 2017-09-21 RX ORDER — OXYCODONE HYDROCHLORIDE 5 MG/1
5-10 TABLET ORAL
Qty: 45 TABLET | Refills: 0 | Status: SHIPPED | OUTPATIENT
Start: 2017-09-21 | End: 2017-10-06

## 2017-09-21 RX ADMIN — DOCUSATE SODIUM 100 MG: 100 CAPSULE, LIQUID FILLED ORAL at 08:46

## 2017-09-21 RX ADMIN — INFLUENZA A VIRUS A/MICHIGAN/45/2015 X-275 (H1N1) ANTIGEN (FORMALDEHYDE INACTIVATED), INFLUENZA A VIRUS A/HONG KONG/4801/2014 X-263B (H3N2) ANTIGEN (FORMALDEHYDE INACTIVATED), INFLUENZA B VIRUS B/PHUKET/3073/2013 ANTIGEN (FORMALDEHYDE INACTIVATED), AND INFLUENZA B VIRUS B/BRISBANE/60/2008 ANTIGEN (FORMALDEHYDE INACTIVATED) 0.5 ML: 15; 15; 15; 15 INJECTION, SUSPENSION INTRAMUSCULAR at 18:33

## 2017-09-21 RX ADMIN — CEFAZOLIN SODIUM 2 G: 2 INJECTION, SOLUTION INTRAVENOUS at 08:46

## 2017-09-21 RX ADMIN — ACETAMINOPHEN 975 MG: 325 TABLET, FILM COATED ORAL at 06:06

## 2017-09-21 RX ADMIN — ACETAMINOPHEN 975 MG: 325 TABLET, FILM COATED ORAL at 13:28

## 2017-09-21 RX ADMIN — LISINOPRIL 40 MG: 40 TABLET ORAL at 08:46

## 2017-09-21 RX ADMIN — TAMSULOSIN HYDROCHLORIDE 0.4 MG: 0.4 CAPSULE ORAL at 08:46

## 2017-09-21 RX ADMIN — POTASSIUM CHLORIDE AND SODIUM CHLORIDE: 900; 150 INJECTION, SOLUTION INTRAVENOUS at 04:12

## 2017-09-21 RX ADMIN — Medication 2 LOZENGE: at 06:08

## 2017-09-21 RX ADMIN — LEVOTHYROXINE SODIUM 100 MCG: 100 TABLET ORAL at 08:46

## 2017-09-21 RX ADMIN — DULOXETINE HYDROCHLORIDE 30 MG: 30 CAPSULE, DELAYED RELEASE PELLETS ORAL at 08:46

## 2017-09-21 NOTE — PLAN OF CARE
Problem: Laminectomy/Foraminotomy/Discectomy (Adult)  Goal: Signs and Symptoms of Listed Potential Problems Will be Absent, Minimized or Managed (Laminectomy/Foraminotomy/Discectomy)  Signs and symptoms of listed potential problems will be absent, minimized or managed by discharge/transition of care (reference Laminectomy/Foraminotomy/Discectomy (Adult) CPG).   Outcome: Improving  Pt moving well, denies pain. Problems with urinary retention. Last void was 200cc with a residual of 130cc. flomax was given earlier today. Pt plans on leaving this evening if  Continues to void in good amts with small residuals. Pt taking good dietary intake without swallow problems. Pt is pleasant and cooperative.

## 2017-09-21 NOTE — DISCHARGE INSTRUCTIONS
May restart Aspirin 81 mg per day on 9-    Take Flomax 0.4mg one per day only until you are able to urinate

## 2017-09-21 NOTE — PROGRESS NOTES
VSS. A&O x4. CMS intact. Dressing CDI. Cervical collar on. Pt. had two occurrences of emesis this evening, both occurring with movement. Given IV Compazine 5 mg. Nausea resolved. Remains on clear liquids. Up with assist of one, gait belt. Voiding frequently this evening in amounts of 200-300 mL of urine. Bladder scanned at 9:40 PM for 800mL, straight cath'd for 900 mL leatha urine at 9:45 PM. DTV. Denies pain this shift. Blood glucose 174.  Will continue to monitor.

## 2017-09-21 NOTE — DISCHARGE SUMMARY
[facility]    Discharge Summary  Neurosurgery    Date of Admission:  9/20/2017  Date of Discharge:  9/21/2017  Discharging Provider: Laurel Pryor  Date of Service (when I saw the patient): 09/21/17    Discharge Diagnoses   Active Problems:    S/P cervical spinal fusion      History of Present Illness   Natali Nicholson is an 47 year old female who presented with cervical radicular pain and weakness on the right.  She had right C4-5 herniated disk and right C5-6 foraminal stenosis. She underwent C4-6 ACDF with Dr. Bharathi Salgado on 9-.  Today she is lying in bed. She notes no pain and no radicular symptoms.  She has not needed oral opioids but is ready to go home. She was educated that the pain may come and go and I will provide her with some Oxycodone just in case. She is ok with this.         Hospital Course   Natali Nicholson was admitted on 9/20/2017.  The following problems were addressed during her hospitalization:    Active Problems:    S/P cervical spinal fusion    Assessment: stable     Plan: DC home         I have discussed the following assessment and plan with Dr. Bharathi Salgado  who is in agreement with initial plan and will follow up with further consultation recommendations.    Laurel Pyror [facility]  [facility]  [place] [address]    Tel [phone]  Pager [phone]        Significant Results and Procedures   Post cervical fusion     Pending Results     Unresulted Labs Ordered in the Past 30 Days of this Admission     No orders found for last 61 day(s).          Code Status   Full Code    Primary Care Physician   Pablo Gomez    Physical Exam   Temp: 98  F (36.7  C) Temp src: Oral BP: 129/86   Heart Rate: 80 Resp: 16 SpO2: 95 % O2 Device: None (Room air) Oxygen Delivery: 2 LPM  Vitals:    09/20/17 0613   Weight: 205 lb (93 kg)     Vital Signs with Ranges  Temp:  [96  F (35.6  C)-98  F (36.7   C)] 98  F (36.7  C)  Heart Rate:  [69-91] 80  Resp:  [11-21] 16  BP: (122-142)/() 129/86  FiO2 (%):  [100 %] 100 %  SpO2:  [91 %-100 %] 95 %  I/O last 3 completed shifts:  In: 3179 [P.O.:480; I.V.:2699]  Out: 4740 [Urine:4300; Emesis/NG output:400; Drains:40]    Constitutional: Awake, alert, cooperative, no apparent distress, and appears stated age.  Eyes: Lids and lashes normal, pupils equal, round and reactive to light, extra ocular muscles intact  ENT: Normocephalic, without obvious abnormality, atraumatic  Respiratory: No increased work of breathing  Skin: No bruising or bleeding, normal skin color, texture, turgor, no redness, warmth, or swelling.  Incision with dressing intact.   Musculoskeletal: There is no redness, warmth, or swelling of the joints.  Full range of motion noted.  Motor strength is 5 out of 5 all extremities bilaterally.  Tone is normal.  Neurologic: Awake, alert, oriented to name, place and time.  Cranial nerves II-XII are grossly intact.  Motor is 5 out of 5 bilaterally.     Neuropsychiatric: Calm, normal eye contact, alert, normal affect, oriented to self, place, time and situation, memory for past and recent events intact and thought process normal.       Time Spent on this Encounter   I, Laurel Pryor, personally saw the patient today and spent greater than 30 minutes discharging this patient.    Discharge Disposition   Discharged to home  Condition at discharge: Stable    Consultations This Hospital Stay   OCCUPATIONAL THERAPY ADULT IP CONSULT  PHYSICAL THERAPY ADULT IP CONSULT    Discharge Orders     X-ray Cervical spine 2-3 vws     Reason for your hospital stay   You were in the hospital for a C4-6 ACDF     Follow-up and recommended labs and tests    Please follow up at the [facility] in 6 weeks with a cervical xray prior.   Please call the clinic at [phone] to schedule your appointment with Laurel Pryor CNP or Zoe Amaro CNP     Activity   Your  activity upon discharge: Discharge instructions:  No lifting of more than 10 pounds, no bending, no twisting until follow up visit.  Wear brace when out of bed as needed for 2 weeks     Ok to shampoo hair, shower or bathe, but, do not scrub or submerge incision under water until evaluated post-operatively in clinic.    Ok to walk as tolerated, avoid bed rest and prolonged sitting.    No contact sports until after follow up visit  No high impact activities such as; running/jogging, snowmobile or 4 paiz riding or any other recreational vehicles.    Do not take NSAIDS (ibuprofen, advil, aleve, naproxen, etc) for pain control.    Do NOT drive while taking narcotic pain medication.    Call the [facility] at [phone] for increasing redness, swelling or pus draining from the incision, increased pain or any other questions and concerns.     Wound care and dressings   Instructions to care for your wound at home: Keep your incision clean and dry at all times.  OK to remove dressing on postop day 2.  OK to shower on postop day 3 and allow water to run over incision, pat dry after shower.  No bathing swimming or submerging in water.  Call immediately or come to ED if any drainage occurs, or you develop new pain, redness, or swelling.     Diet   Follow this diet upon discharge: regular diet       Discharge Medications   Current Discharge Medication List      START taking these medications    Details   oxyCODONE (ROXICODONE) 5 MG IR tablet Take 1-2 tablets (5-10 mg) by mouth every 3 hours as needed for moderate to severe pain  Qty: 45 tablet, Refills: 0    Associated Diagnoses: S/P cervical spinal fusion      diazepam (VALIUM) 5 MG tablet Take 1 tablet (5 mg) by mouth every 6 hours as needed for other (muscle spasms)  Qty: 60 tablet, Refills: 0    Associated Diagnoses: S/P cervical spinal fusion      tamsulosin (FLOMAX) 0.4 MG capsule Take 1 capsule (0.4 mg) by mouth daily  Qty: 60 capsule, Refills: 0     Associated Diagnoses: Urinary retention         CONTINUE these medications which have NOT CHANGED    Details   levothyroxine (SYNTHROID/LEVOTHROID) 100 MCG tablet Take 1 tablet (100 mcg) by mouth daily  Qty: 90 tablet, Refills: 3    Comments: Note dose change. Stop RFs of the 88mcg tab  Associated Diagnoses: Hypothyroidism, unspecified type      DULoxetine (CYMBALTA) 30 MG capsule Take 1 capsule (30 mg) by mouth 2 times daily  Qty: 180 capsule, Refills: 3    Comments: Profile only. Pt doesn't require refill at this time  Associated Diagnoses: Recurrent major depressive disorder, in full remission (H)      simvastatin (ZOCOR) 40 MG tablet Take 1 tablet (40 mg) by mouth At Bedtime  Qty: 90 tablet, Refills: 3    Associated Diagnoses: Hyperlipidemia LDL goal <100      metFORMIN (GLUCOPHAGE) 1000 MG tablet Take 1 tablet (1,000 mg) by mouth 2 times daily (with meals) with breakfast and dinner.  Qty: 180 tablet, Refills: 3    Associated Diagnoses: Type 2 diabetes, HbA1c goal < 7% (H)      lisinopril (PRINIVIL,ZESTRIL) 40 MG tablet Take 1 tablet (40 mg) by mouth daily  Qty: 90 tablet, Refills: 3    Comments: Note dose change. Stop RFs of the 20mg tab  Associated Diagnoses: Essential hypertension with goal blood pressure less than 130/80      blood glucose monitoring (ONETOUCH VERIO) meter device kit Use to test blood sugar 1 times daily or as directed.  Reference #[id number]  Qty: 1 kit, Refills: 0    Associated Diagnoses: Type 2 diabetes mellitus without complication, without long-term current use of insulin (H)      blood glucose monitoring (ONE TOUCH VERIO IQ) test strip Use to test blood sugars 1 times daily or as directed.  Reference #[id number]  Qty: 100 each, Refills: 3    Associated Diagnoses: Type 2 diabetes mellitus without complication, without long-term current use of insulin (H)      blood glucose monitoring (ONE TOUCH DELICA) lancets Use to test blood sugars 1 times daily or as directed. Reference  #[id number]  Qty: 1 Box, Refills: 3    Comments: Dispense box of 100  Associated Diagnoses: Type 2 diabetes mellitus without complication, without long-term current use of insulin (H)         STOP taking these medications       ASPIRIN 81 MG OR TABS Comments:   Reason for Stopping:             Allergies   Allergies   Allergen Reactions     No Known Drug Allergies

## 2017-09-21 NOTE — PLAN OF CARE
Problem: Patient Care Overview  Goal: Plan of Care/Patient Progress Review  PT: Orders received and chart reviewed. Eval completed. Prior to admit patient living with older parents in single level home with 2 steps to enter and full flight to basement laundry. Pt previously independent with mobility, ADLs and daily activities, states her only limitation was R UE overhead extension which is now resolved.   Discharge Planner PT   Patient plan for discharge: Home  Current status: Pt is independent with bed mobility and transfers. Pt ambulated 500ft w/o AD, normal step length and pace, encouraged pain free cervical range of motion as pt is turning body at this time. Pt educated on body mech and proper posture with mobility and ADLS. OT screen completed - no OT needs at this time.  Barriers to return to prior living situation: none  Recommendations for discharge: home  Rationale for recommendations: Pt completed transfer and ambulation independently.       Entered by: Debby Logan 09/21/2017 3:57 PM

## 2017-09-21 NOTE — PLAN OF CARE
Problem: Patient Care Overview  Goal: Plan of Care/Patient Progress Review  Outcome: Improving  Orientation: Alert and oriented x4  VSS. 95% on RA. Afebrile.   LS: clear and equal bilaterally.   GI: Denies Passing gas. No BM. Denies N/V. Hypoactive bowel sounds. Tolerating clears. Advanced to regular this AM.   : Clear pale urine. Up to bathroom x2. Bladder scanned post void x2. Straight cath for 800 ml out x2. Last straight cath at 0327. Pt due to void.   Skin: Dressing C/D/I. C-Collar in place. CANDIE 10 ml dark red output.   Activity: SBA. Up to bathroom x2. Pt slept comfortably throughout shift.   Pain: 0/10. Denies pain throughout shift. Refusing PRN interventions.   Plan: Continue with current cares.

## 2017-09-21 NOTE — PROGRESS NOTES
OT:  eval order received and chart reviewed.  Pt admitted for C4-5, C5-6 anterior cervical discectomy with arthrodesis.  Per PT, no skilled OT needs identified at this time.  Plan to complete order.

## 2017-09-21 NOTE — PROGRESS NOTES
09/21/17 1300   Quick Adds   Type of Visit Initial PT Evaluation   Living Environment   Lives With parent(s)   Living Arrangements house   Number of Stairs to Enter Home 2   Number of Stairs Within Home 14   Stair Railings at Home inside, present on right side   Transportation Available family or friend will provide   Self-Care   Usual Activity Tolerance excellent   Current Activity Tolerance good   Regular Exercise yes   Activity/Exercise Type walking   Equipment Currently Used at Home none   Functional Level Prior   Ambulation 0-->independent   Transferring 0-->independent   Toileting 0-->independent   Bathing 0-->independent   Dressing 0-->independent   Fall history within last six months no   Prior Functional Level Comment independent   General Information   Onset of Illness/Injury or Date of Surgery - Date 09/20/17   Referring Physician Bharathi Salgado MD   Patient/Family Goals Statement home   Pertinent History of Current Problem (include personal factors and/or comorbidities that impact the POC) pt C4-5, C5-6 anterior cervical discectomy with arthrodesis    Precautions/Limitations spinal precautions   Cognitive Status Examination   Orientation orientation to person, place and time   Level of Consciousness alert   Follows Commands and Answers Questions 100% of the time   Personal Safety and Judgment intact   Range of Motion (ROM)   ROM Comment dec cervical ROM post fusion   Strength   Strength Comments BUE UE observed to be at least AG   Bed Mobility   Bed Mobility Comments indep   Transfer Skills   Transfer Comments indep   Gait   Gait Comments amb 500ft w/o AD, normal step length and pace   General Therapy Interventions   Planned Therapy Interventions home program guidelines   Clinical Impression   Criteria for Skilled Therapeutic Intervention yes, treatment indicated   PT Diagnosis neck pain   Influenced by the following impairments decreased activity tolerance   Clinical Presentation  "Stable/Uncomplicated   Clinical Presentation Rationale clinical observation   Clinical Decision Making (Complexity) Low complexity   Therapy Frequency` (eval and single treat)   Predicted Duration of Therapy Intervention (days/wks) one time only   Anticipated Discharge Disposition Home   Risk & Benefits of therapy have been explained Yes   Patient, Family & other staff in agreement with plan of care Yes   [facility] TM \"6 Clicks\"   2016, Trustees of [facility], under license to [employer].  All rights reserved.   6 Clicks Short Forms Basic Mobility Inpatient Short Form   [facility]  \"6 Clicks\" V.2 Basic Mobility Inpatient Short Form   1. Turning from your back to your side while in a flat bed without using bedrails? 4 - None   2. Moving from lying on your back to sitting on the side of a flat bed without using bedrails? 4 - None   3. Moving to and from a bed to a chair (including a wheelchair)? 4 - None   4. Standing up from a chair using your arms (e.g., wheelchair, or bedside chair)? 4 - None   5. To walk in hospital room? 4 - None   6. Climbing 3-5 steps with a railing? 4 - None   Basic Mobility Raw Score (Score out of 24.Lower scores equate to lower levels of function) 24   Total Evaluation Time   Total Evaluation Time (Minutes) 10     "

## 2017-09-22 ENCOUNTER — TELEPHONE (OUTPATIENT)
Dept: INTERNAL MEDICINE | Facility: CLINIC | Age: 47
End: 2017-09-22

## 2017-09-22 ENCOUNTER — DOCUMENTATION ONLY (OUTPATIENT)
Dept: NEUROSURGERY | Facility: CLINIC | Age: 47
End: 2017-09-22

## 2017-09-22 NOTE — PROGRESS NOTES
Reviewed discharge instructions and medications with patient. Questions answered. Patient discharged to home with friend driving, discharge instructions, medications (Valium/Oxycodone/Flomax), and belongings at this time.

## 2017-09-22 NOTE — PROGRESS NOTES
9/22/2017    Disability: yes, [employer] short term     Faxed to [phone]     Type of form Attending provider statement     Placed a copy in the bin and sent the original to medical records

## 2017-09-25 NOTE — TELEPHONE ENCOUNTER
Patient said she was told not to take ASA until told by doctor to do so. Denies any incisional bleeding. Also said she still takes Flomax and seems to be urinating well, does not feel she is retaining. Her AVS says:  May restart Aspirin 81 mg per day on 9-     Take Flomax 0.4mg one per day only until you are able to urinate     Should she start ASA and stop Flomax? Or do you want her to ask neurosug? Also, has appt. 10/6 with PCP. Can only do Fridays and that was soonest Friday available with PCP. Please advise if needs to be soon earlier. Denies any new issues since leaving hospital but would like to f/u with PCP.

## 2017-09-25 NOTE — TELEPHONE ENCOUNTER
"Hospital/TCU/ED for chronic condition Discharge Protocol    \"Hi, my name is Hammad Mireles, a registered nurse, and I am calling from [facility] (formerly [facility]).  I am calling to follow up and see how things are going for you after your recent emergency visit/hospital/TCU stay.\"    Tell me how you are doing now that you are home?\" not bad      Discharge Instructions    \"Let's review your discharge instructions.  What is/are the follow-up recommendations?  Pt. Response: f/u with Dr. Gomez and Dr. Salgado    \"Has an appointment with your primary care provider been scheduled?\"   No (schedule appointment)    \"When you see the provider, I would recommend that you bring your medications with you.\"    Medications    \"Tell me what changed about your medicines when you discharged?\"    Changes to chronic meds?    0-1    \"What questions do you have about your medications?\"    Can she restart aspirin     New diagnoses of heart failure, COPD, diabetes, or MI?    No                  Medication reconciliation completed? Yes  Was MTM referral placed (*Make sure to put transitions as reason for referral)?   No    Call Summary    \"What questions or concerns do you have about your recent visit and your follow-up care?\"     none    \"If you have questions or things don't continue to improve, we encourage you contact us through the main clinic number (give number).  Even if the clinic is not open, triage nurses are available 24/7 to help you.     We would like you to know that our clinic has extended hours (provide information).  We also have urgent care (provide details on closest location and hours/contact info)\"      \"Thank you for your time and take care!\"         "

## 2017-09-27 ENCOUNTER — DOCUMENTATION ONLY (OUTPATIENT)
Dept: NEUROSURGERY | Facility: CLINIC | Age: 47
End: 2017-09-27

## 2017-09-27 NOTE — TELEPHONE ENCOUNTER
OK to restart ASA 81mg daily  The Tamsulosin/Flomax she is taking  Helps to improve urine flow that might be affected by taking narcotic pain medication after surgery. If pt either no longter taking narcotic pain meds (Oxycodone) or still taking the pain med but urinating fine, then pt to try stopping the Tamsulosin/Flomax. If urinary issues o ccur with stopping the Tamsulosin, then restart it and inform MD for additional refill med  OK to see me 10/6/17 if can only be seen Fridays and otherwise doing well

## 2017-10-04 DIAGNOSIS — I10 ESSENTIAL HYPERTENSION WITH GOAL BLOOD PRESSURE LESS THAN 130/80: ICD-10-CM

## 2017-10-04 DIAGNOSIS — E78.5 HYPERLIPIDEMIA LDL GOAL <100: ICD-10-CM

## 2017-10-04 RX ORDER — SIMVASTATIN 40 MG
TABLET ORAL
Qty: 90 TABLET | Refills: 2 | Status: SHIPPED | OUTPATIENT
Start: 2017-10-04 | End: 2018-07-15

## 2017-10-04 RX ORDER — LISINOPRIL 40 MG/1
TABLET ORAL
Qty: 90 TABLET | Refills: 3 | Status: SHIPPED | OUTPATIENT
Start: 2017-10-04 | End: 2018-09-28

## 2017-10-04 NOTE — TELEPHONE ENCOUNTER
simvastatin (ZOCOR) 40 MG tablet     Last Written Prescription Date: 10/04/2016  Last Fill Quantity: 90, # refills: 3  Last Office Visit with [facility] or  Health prescribing provider: 9/12/2017  Next 5 appointments (look out 90 days)     Oct 06, 2017  9:30 AM CDT   [facility] Visit with Pablo Gomez MD   [facility]    [address] [address]   [phone]            Nov 03, 2017 11:00 AM CDT   Return Visit with JES Ness CNP   [facility] ([facility]    [address]  [facility] [id number] [phone]                   Lab Results   Component Value Date    CHOL 155 08/15/2017     Lab Results   Component Value Date    HDL 41 08/15/2017     Lab Results   Component Value Date    LDL 76 08/15/2017     Lab Results   Component Value Date    TRIG 188 08/15/2017     Lab Results   Component Value Date    CHOLHDLRATIO 3.0 12/16/2014     lisinopril (PRINIVIL,ZESTRIL) 40 MG tablet      Last Written Prescription Date: 10/04/2016  Last Fill Quantity: 90, # refills: 3  Last Office Visit with [facility] or  Health prescribing provider: 9/12/2017  Next 5 appointments (look out 90 days)     Oct 06, 2017  9:30 AM CDT   Office Visit with Pablo Gomez MD   [facility]    [address] [address]   [phone]            Nov 03, 2017 11:00 AM CDT   Return Visit with JES Ness CNP   [facility] ([facility]    [address]  [facility] [id number] [phone]                   Potassium   Date Value Ref Range Status   09/20/2017 3.8 3.4 - 5.3 mmol/L Final     Creatinine   Date Value Ref Range Status   09/20/2017 0.66 0.52 - 1.04 mg/dL Final     BP Readings from Last 3 Encounters:   09/21/17 127/68   09/12/17  112/80   07/11/17 114/81

## 2017-10-06 ENCOUNTER — OFFICE VISIT (OUTPATIENT)
Dept: INTERNAL MEDICINE | Facility: CLINIC | Age: 47
End: 2017-10-06
Payer: COMMERCIAL

## 2017-10-06 VITALS
BODY MASS INDEX: 34.67 KG/M2 | OXYGEN SATURATION: 99 % | TEMPERATURE: 98.2 F | DIASTOLIC BLOOD PRESSURE: 70 MMHG | SYSTOLIC BLOOD PRESSURE: 128 MMHG | WEIGHT: 202 LBS | HEART RATE: 82 BPM

## 2017-10-06 DIAGNOSIS — E03.9 HYPOTHYROIDISM, UNSPECIFIED TYPE: ICD-10-CM

## 2017-10-06 DIAGNOSIS — E11.9 TYPE 2 DIABETES MELLITUS WITHOUT COMPLICATION, WITHOUT LONG-TERM CURRENT USE OF INSULIN (H): ICD-10-CM

## 2017-10-06 DIAGNOSIS — Z98.1 S/P CERVICAL SPINAL FUSION: Primary | ICD-10-CM

## 2017-10-06 PROCEDURE — 99213 OFFICE O/P EST LOW 20 MIN: CPT | Performed by: INTERNAL MEDICINE

## 2017-10-06 NOTE — NURSING NOTE
"Chief Complaint   Patient presents with     FU Hospitalization       Initial /70  Pulse 82  Temp 98.2  F (36.8  C) (Oral)  Wt 202 lb (91.6 kg)  LMP 09/16/2017 (Exact Date)  SpO2 99%  BMI 34.67 kg/m2 Estimated body mass index is 34.67 kg/(m^2) as calculated from the following:    Height as of 9/20/17: 5' 4\" (1.626 m).    Weight as of this encounter: 202 lb (91.6 kg).  Medication Reconciliation: complete  "

## 2017-10-06 NOTE — PROGRESS NOTES
SUBJECTIVE:   Natali Nicholson is a 47 year old female who presents to clinic today for the following health issues:    Chief Complaint   Patient presents with     [facility] Hospitalization     Hospital Follow-up Visit:    Hospital/Nursing Home/IP Rehab Facility: [facility]  Date of Admission: 09/20/2017  Date of Discharge: 09/21/2017  Tele contact 9/22/17  Reason(s) for Admission: Cervical Spinal Fusion         Problems taking medications regularly:  None       Medication changes since discharge: None       Problems adhering to non-medication therapy:  None    Summary of hospitalization:  [facility] discharge summary reviewed  Diagnostic Tests/Treatments reviewed.  Follow up needed:  Future cervical Xray with neurosurgery  Other Healthcare Providers Involved in Patient s Care:         neurosurgery  Update since discharge: improved.     Post Discharge Medication Reconciliation: discharge medications reconciled and changed, per note/orders (see AVS).  Plan of care communicated with patient     Coding guidelines for this visit:  Type of Medical   Decision Making Face-to-Face Visit       within 7 Days of discharge Face-to-Face Visit        within 14 days of discharge   Moderate Complexity [id number]   High Complexity [id number]            Pt's past medical history, family history, habits, medications and allergies were reviewed with the patient today.  See snap shot for  HCM status. Most recent lab results reviewed with pt. Problem list and histories reviewed & adjusted, as indicated.  Additional history as below:    Discharge summary reviewed. Part of the summary as below:    Discharge Summary  Neurosurgery   Date of Admission:  9/20/2017  Date of Discharge:  9/21/2017  Discharging Provider: Laurel Pryor  Date of Service (when I saw the patient): 09/21/17     Discharge Diagnoses     Active Problems:    S/P cervical spinal fusion     History of Present Illness   Natali Nicholson is an 47 year  old female who presented with cervical radicular pain and weakness on the right.  She had right C4-5 herniated disk and right C5-6 foraminal stenosis. She underwent C4-6 ACDF with Dr. Bharathi Salgado on 9-.  Today she is lying in bed. She notes no pain and no radicular symptoms.  She has not needed oral opioids but is ready to go home. She was educated that the pain may come and go and I will provide her with some Oxycodone just in case. She is ok with this.           Hospital Course   Natail Nicholson was admitted on 9/20/2017.  The following problems were addressed during her hospitalization:     Active Problems:    S/P cervical spinal fusion    Assessment: stable     Plan: DC home                    Patient off of narcotic pain medication, Valium and Flomax now. Using Tylenol for pain only on a rare basis. Patient describes her pain as minimal. No able to fully abduct her right arm above her head where she could previously only get this to 90  because of arm weakness with cervical spine issues. Denies any sensation of weakness or numbness in her right arm currently. Patient is on a 10 pound weight lift restriction until seen by neurosurgery in November per her report. Will be having repeat x-rays at that time. Patient has not been checking blood sugars at home. Thyroid medication dose was increased at recent preop. Will be due for repeat diabetic and thyroid labs in late November. Flu shot is up-to-date. Denies CP, SOB, abdominal pain, polyuria, polydipsia, vision changes, extremity numbness/parasthesias or skin problems.    Additional ROS:   Constitutional, HEENT, Cardiovascular, Pulmonary, GI and , Neuro, MSK and Psych review of systems/symptoms are otherwise negative or unchanged from previous, except as noted above.      OBJECTIVE:  /70  Pulse 82  Temp 98.2  F (36.8  C) (Oral)  Wt 202 lb (91.6 kg)  LMP 09/16/2017 (Exact Date)  SpO2 99%  BMI 34.67 kg/m2   Estimated body mass index is  "34.67 kg/(m^2) as calculated from the following:    Height as of 9/20/17: 5' 4\" (1.626 m).    Weight as of this encounter: 202 lb (91.6 kg).  Eye: PERRL, EOMI  HENT: ear canals and TM's normal and nose and mouth without ulcers or lesions   Neck: no adenopathy. Thyroid normal to palpation. No bruits. Anterior neck incision C/D/I  Pulm: Lungs clear to auscultation   CV: Regular rates and rhythm  GI: Soft, nontender, Normal active bowel sounds, No hepatosplenomegaly or masses palpable  Ext: Peripheral pulses intact. No edema.  Neuro: Normal strength and tone, sensory exam grossly normal.  Able to fully abduct RUE  In full arm raise and fingers extended towards the ceiling.  Specifically equal strength  BUE with handgrip,  Elbow F/E and shoulder abduction    Assessment/Plan: (See plan discussion below for further details)  1. S/P cervical spinal fusion   Excellent strength RUE now and off pain meds. Pt to f/u with neurosurgery as scheduled  With restrictions per their previous instructions between now and then    2. Hypothyroidism, unspecified type  Recent Levothyroxine dosage increase. Repeat thyroid lab late November as previously ordered    3. Type 2 diabetes mellitus without complication, without long-term current use of insulin (H)  Needs a little better control per labs in August. Has improved diet and thyroid metabolism boosted since then. Not checking sugars. Will await labs in late November      Pablo Gomez MD  Internal Medicine Department  [facility]        "

## 2017-10-06 NOTE — MR AVS SNAPSHOT
After Visit Summary   10/6/2017    Natali Nicholson    MRN: [id number]           Patient Information     Date Of Birth          1970        Visit Information        Provider Department      10/6/2017 9:30 AM Pablo Gomez MD [facility]        Today's Diagnoses     S/P cervical spinal fusion    -  1    Hypothyroidism, unspecified type        Type 2 diabetes mellitus without complication, without long-term current use of insulin (H)           Follow-ups after your visit        Your next 10 appointments already scheduled     Nov 03, 2017 10:30 AM CDT   XR CERVICAL SPINE 2/3 VIEWS with CSXR1   [facility]    [address] [facility] [id number] [phone]           Please bring a list of your current medicines to your exam. (Include vitamins, minerals and over-thecounter medicines.) Leave your valuables at home.  Tell your doctor if there is a chance you may be pregnant.  You do not need to do anything special for this exam.            Nov 03, 2017 11:00 AM CDT   Return Visit with JES Ness CNP   [facility] ([facility])    [address] [place] [id number] [phone]              Who to contact     If you have questions or need follow up information about today's clinic visit or your schedule please contact [facility] directly at [phone].  Normal or non-critical lab and imaging results will be communicated to you by MyChart, letter or phone within 4 business days after the clinic has received the results. If you do not hear from us within 7 days, please contact the clinic through MyChart or phone. If you have a critical or abnormal lab result, we will notify you by phone as soon as possible.  Submit refill requests through [employer] or call your pharmacy and they will forward the refill request to us. Please allow 3  business days for your refill to be completed.          Additional Information About Your Visit        MyChart Information     [employer] gives you secure access to your electronic health record. If you see a primary care provider, you can also send messages to your care team and make appointments. If you have questions, please call your primary care clinic.  If you do not have a primary care provider, please call [phone] and they will assist you.        Care EveryWhere ID     This is your Care EveryWhere ID. This could be used by other organizations to access your [place] medical records  [id number]        Your Vitals Were     Pulse Temperature Last Period Pulse Oximetry BMI (Body Mass Index)       82 98.2  F (36.8  C) (Oral) 09/16/2017 (Exact Date) 99% 34.67 kg/m2        Blood Pressure from Last 3 Encounters:   10/06/17 128/70   09/21/17 127/68   09/12/17 112/80    Weight from Last 3 Encounters:   10/06/17 202 lb (91.6 kg)   09/20/17 205 lb (93 kg)   09/12/17 204 lb (92.5 kg)              Today, you had the following     No orders found for display       Primary Care Provider Office Phone # Fax #    Pablo Gomez [phone]       [address] [facility] [address]        Equal Access to Services     HERIBERTO MELÉNDEZ AH: Hadii aad ku hadasho Soomaali, waaxda luqadaha, qaybta kaalmada adeegyada, waxay idiin hayaan yani khsandra simmons . So [facility] [phone].    ATENCIÓN: Si habla español, tiene a crawley disposición servicios gratuitos de asistencia lingüística. Llame al [phone].    We comply with applicable federal civil rights laws and [place] laws. We do not discriminate on the basis of race, color, national origin, age, disability, sex, sexual orientation, or gender identity.            Thank you!     Thank you for choosing [facility]  for your care. Our goal is always to provide you with excellent care. Hearing back from our patients is one way we can continue to  improve our services. Please take a few minutes to complete the written survey that you may receive in the mail after your visit with us. Thank you!             Your Updated Medication List - Protect others around you: Learn how to safely use, store and throw away your medicines at www.disposemymeds.org.          This list is accurate as of: 10/6/17 10:09 AM.  Always use your most recent med list.                   Brand Name Dispense Instructions for use Diagnosis    blood glucose monitoring lancets     1 Box    Use to test blood sugars 1 times daily or as directed. Reference #[id number]    Type 2 diabetes mellitus without complication, without long-term current use of insulin (H)       blood glucose monitoring meter device kit     1 kit    Use to test blood sugar 1 times daily or as directed.  Reference #[id number]    Type 2 diabetes mellitus without complication, without long-term current use of insulin (H)       blood glucose monitoring test strip    ONE TOUCH VERIO IQ    100 each    Use to test blood sugars 1 times daily or as directed.  Reference #[id number]    Type 2 diabetes mellitus without complication, without long-term current use of insulin (H)       DULoxetine 30 MG EC capsule    CYMBALTA    180 capsule    Take 1 capsule (30 mg) by mouth 2 times daily    Recurrent major depressive disorder, in full remission (H)       levothyroxine 100 MCG tablet    SYNTHROID/LEVOTHROID    90 tablet    Take 1 tablet (100 mcg) by mouth daily    Hypothyroidism, unspecified type       lisinopril 40 MG tablet    PRINIVIL/ZESTRIL    90 tablet    TAKE 1 TABLET DAILY (DOSE  CHANGE)    Essential hypertension with goal blood pressure less than 130/80       metFORMIN 1000 MG tablet    GLUCOPHAGE    180 tablet    Take 1 tablet (1,000 mg) by mouth 2 times daily (with meals) with breakfast and dinner.    Type 2 diabetes, HbA1c goal < 7% (H)       simvastatin 40 MG tablet    ZOCOR    90 tablet    TAKE 1 TABLET AT BEDTIME     Hyperlipidemia LDL goal <100

## 2017-10-10 ENCOUNTER — DOCUMENTATION ONLY (OUTPATIENT)
Dept: NEUROSURGERY | Facility: CLINIC | Age: 47
End: 2017-10-10

## 2017-10-10 NOTE — PROGRESS NOTES
10/10/2017    [facility] Forms: yes    Faxed [phone]     Type of form Certification of Health care provider form, associates serious health condition (for medical leaves of absence)    Placed a copy in the bin and sent the original to medical records

## 2017-10-20 DIAGNOSIS — F33.42 RECURRENT MAJOR DEPRESSIVE DISORDER, IN FULL REMISSION (H): ICD-10-CM

## 2017-10-23 RX ORDER — DULOXETIN HYDROCHLORIDE 30 MG/1
CAPSULE, DELAYED RELEASE ORAL
Qty: 180 CAPSULE | Refills: 1 | Status: SHIPPED | OUTPATIENT
Start: 2017-10-23 | End: 2018-09-28

## 2017-11-03 ENCOUNTER — DOCUMENTATION ONLY (OUTPATIENT)
Dept: NEUROSURGERY | Facility: CLINIC | Age: 47
End: 2017-11-03

## 2017-11-03 ENCOUNTER — OFFICE VISIT (OUTPATIENT)
Dept: NEUROSURGERY | Facility: CLINIC | Age: 47
End: 2017-11-03
Attending: NURSE PRACTITIONER
Payer: COMMERCIAL

## 2017-11-03 ENCOUNTER — RADIANT APPOINTMENT (OUTPATIENT)
Dept: GENERAL RADIOLOGY | Facility: CLINIC | Age: 47
End: 2017-11-03
Attending: NURSE PRACTITIONER
Payer: COMMERCIAL

## 2017-11-03 VITALS
WEIGHT: 203 LBS | HEART RATE: 82 BPM | HEIGHT: 66 IN | DIASTOLIC BLOOD PRESSURE: 85 MMHG | OXYGEN SATURATION: 96 % | BODY MASS INDEX: 32.62 KG/M2 | SYSTOLIC BLOOD PRESSURE: 118 MMHG

## 2017-11-03 DIAGNOSIS — Z98.1 S/P CERVICAL SPINAL FUSION: ICD-10-CM

## 2017-11-03 DIAGNOSIS — Z98.1 STATUS POST CERVICAL SPINAL FUSION: Primary | ICD-10-CM

## 2017-11-03 PROCEDURE — 99024 POSTOP FOLLOW-UP VISIT: CPT | Performed by: NURSE PRACTITIONER

## 2017-11-03 PROCEDURE — 72040 X-RAY EXAM NECK SPINE 2-3 VW: CPT

## 2017-11-03 PROCEDURE — 99211 OFF/OP EST MAY X REQ PHY/QHP: CPT | Performed by: NURSE PRACTITIONER

## 2017-11-03 ASSESSMENT — PAIN SCALES - GENERAL: PAINLEVEL: NO PAIN (0)

## 2017-11-03 NOTE — PROGRESS NOTES
11/3/2017    [facility] Forms: yes    Faxed to [phone]     Type of form: Return to work release form     Placed a copy in the bin and sent the original to medical records

## 2017-11-03 NOTE — PROGRESS NOTES
[facility]  Neurosurgery followup:    HPI: Ms. Nicholson is a 47 year old female that returns almost 6 weeks post C4-5 and C5-6 ACDF. She reports no neck pain or radicular symptoms.  She is very happy with the results of the surgery.       Exam:  Constitutional:  Alert, well nourished, NAD.  HEENT: Normocephalic, atraumatic.   Pulm:  Without shortness of breath   CV:  No pitting edema of BLE.     Neurological:  Awake  Alert  Oriented x 3  Motor exam:     Shoulder Abduction:  Right:  5/5    Left:  5/5  Biceps:                      Right:  5/5    Left:  5/5  Triceps:                     Right:  5/5    Left:  5/5  Wrist Extensors:       Right:  5/5    Left:  5/5  Wrist Flexors:           Right:  5/5    Left:  5/5  Intrinsics:                  Right:  5/5    Left:  5/5     Able to spontaneously move U/E bilaterally  Sensation intact throughout all U/E dermatomes  Incisions:  Cervical incision healed  Imaging:  Cervical xray shows fusion intact   A/P: Ms. Nicholson is a 47 year old female that returns almost 6 weeks post C4-5 and C5-6 ACDF. She reports no neck pain or radicular symptoms.  She is very happy with the results of the surgery. She is ready to return to work with restrictions. Her paperwork was completed and a letter was written.       - May increase lifting restriction to 20  pounds    - followup in 6 weeks  with xray prior     - Call the clinic at [phone] for increasing redness, swelling or pus draining from the incision, increased pain or any other questions and concerns.        Laurel Pryor [facility]  [facility]  [address] [address]    Tel [phone]  Pager [phone]

## 2017-11-03 NOTE — LETTER
November 3, 2017      To Whom it May Concern,          Natali Nicholson was seen in clinic on 11-3-2017.  She is able to return to work with the following restrictions on Monday November 6, 2017:      Restrictions:    - No lifting greater than 20 pounds.  - No repetitive bending, twisting, or overhead reaching  - 5 minute hourly stretch breaks     - Will reassess at next appointment in 6 weeks.       Sincerely,        Laurel Pryor CNP  [facility]  [address] [address]    Tel [phone]

## 2017-11-03 NOTE — LETTER
11/3/2017         RE: Natali Nicholson  [address]  [facility] [address]        Dear Colleague,    Thank you for referring your patient, Natali Nicholson, to the [facility]. Please see a copy of my visit note below.    [facility]  Neurosurgery followup:    HPI: Ms. Nicholson is a 47 year old female that returns almost 6 weeks post C4-5 and C5-6 ACDF. She reports no neck pain or radicular symptoms.  She is very happy with the results of the surgery.       Exam:  Constitutional:  Alert, well nourished, NAD.  HEENT: Normocephalic, atraumatic.   Pulm:  Without shortness of breath   CV:  No pitting edema of BLE.     Neurological:  Awake  Alert  Oriented x 3  Motor exam:     Shoulder Abduction:  Right:  5/5    Left:  5/5  Biceps:                      Right:  5/5    Left:  5/5  Triceps:                     Right:  5/5    Left:  5/5  Wrist Extensors:       Right:  5/5    Left:  5/5  Wrist Flexors:           Right:  5/5    Left:  5/5  Intrinsics:                  Right:  5/5    Left:  5/5     Able to spontaneously move U/E bilaterally  Sensation intact throughout all U/E dermatomes  Incisions:  Cervical incision healed  Imaging:  Cervical xray shows fusion intact   A/P: Ms. Nicholson is a 47 year old female that returns almost 6 weeks post C4-5 and C5-6 ACDF. She reports no neck pain or radicular symptoms.  She is very happy with the results of the surgery. She is ready to return to work with restrictions. Her paperwork was completed and a letter was written.       - May increase lifting restriction to 20  pounds    - followup in 6 weeks  with xray prior     - Call the clinic at [phone] for increasing redness, swelling or pus draining from the incision, increased pain or any other questions and concerns.        Laurel Pryor [facility]  [facility]  [address]  [address] [place] [address]    Tel [phone]  Pager  [phone]      Again, thank you for allowing me to participate in the care of your patient.        Sincerely,        JES Ness CNP

## 2017-11-03 NOTE — MR AVS SNAPSHOT
After Visit Summary   11/3/2017    Natali Nicholson    MRN: [id number]           Patient Information     Date Of Birth          1970        Visit Information        Provider Department      11/3/2017 11:00 AM Laurel Pryor APRN CNP [facility]        Today's Diagnoses     Status post cervical spinal fusion    -  1      Care Instructions    - May increase lifting restriction to 20  pounds    - followup in 6 weeks  with xray prior     - Call the clinic at [phone] for increasing redness, swelling or pus draining from the incision, increased pain or any other questions and concerns.              Follow-ups after your visit        Your next 10 appointments already scheduled     Nov 03, 2017 11:00 AM CDT   Return Visit with JES Ness CNP   [facility] ([facility])    [address] [place] [id number] [phone]              Future tests that were ordered for you today     Open Future Orders        Priority Expected Expires Ordered    XR Cervical Spine 2/3 Views Routine 12/15/2017 11/3/2018 11/3/2017            Who to contact     If you have questions or need follow up information about today's clinic visit or your schedule please contact [facility] directly at [phone].  Normal or non-critical lab and imaging results will be communicated to you by MyChart, letter or phone within 4 business days after the clinic has received the results. If you do not hear from us within 7 days, please contact the clinic through MyChart or phone. If you have a critical or abnormal lab result, we will notify you by phone as soon as possible.  Submit refill requests through [employer] or call your pharmacy and they will forward the refill request to us. Please allow 3 business days for your refill to be completed.          Additional Information About Your Visit       "  MyChart Information     [employer] gives you secure access to your electronic health record. If you see a primary care provider, you can also send messages to your care team and make appointments. If you have questions, please call your primary care clinic.  If you do not have a primary care provider, please call [phone] and they will assist you.        Care EveryWhere ID     This is your Care EveryWhere ID. This could be used by other organizations to access your [place] medical records  [id number]        Your Vitals Were     Pulse Height Last Period Pulse Oximetry BMI (Body Mass Index)       82 5' 6\" (1.676 m) 09/16/2017 (Exact Date) 96% 32.77 kg/m2        Blood Pressure from Last 3 Encounters:   11/03/17 118/85   10/06/17 128/70   09/21/17 127/68    Weight from Last 3 Encounters:   11/03/17 203 lb (92.1 kg)   10/06/17 202 lb (91.6 kg)   09/20/17 205 lb (93 kg)               Primary Care Provider Office Phone # Fax #    Pablo Gomez [phone]       [address] [facility] [address]        Equal Access to Services     HERIBERTO MELÉNDEZ AH: Hadii aad ku hadasho Soomaali, waaxda luqadaha, qaybta kaalmada adeegyada, tracy thomasin hayjohannn yani sequeira laashokn lázaro. So [facility] [phone].    ATENCIÓN: Si habla español, tiene a crawley disposición servicios gratuitos de asistencia lingüística. [facility] [phone].    We comply with applicable federal civil rights laws and [place] laws. We do not discriminate on the basis of race, color, national origin, age, disability, sex, sexual orientation, or gender identity.            Thank you!     Thank you for choosing [facility] NEUROSURGERY [facility]  for your care. Our goal is always to provide you with excellent care. Hearing back from our patients is one way we can continue to improve our services. Please take a few minutes to complete the written survey that you may receive in the mail after your visit with us. Thank you!             Your Updated " Medication List - Protect others around you: Learn how to safely use, store and throw away your medicines at www.disposemymeds.org.          This list is accurate as of: 11/3/17 10:49 AM.  Always use your most recent med list.                   Brand Name Dispense Instructions for use Diagnosis    blood glucose monitoring lancets     1 Box    Use to test blood sugars 1 times daily or as directed. Reference #[id number]    Type 2 diabetes mellitus without complication, without long-term current use of insulin (H)       blood glucose monitoring meter device kit     1 kit    Use to test blood sugar 1 times daily or as directed.  Reference #[id number]    Type 2 diabetes mellitus without complication, without long-term current use of insulin (H)       blood glucose monitoring test strip    ONETOUCH VERIO IQ    100 each    Use to test blood sugars 1 times daily or as directed.  Reference #[id number]    Type 2 diabetes mellitus without complication, without long-term current use of insulin (H)       DULoxetine 30 MG EC capsule    CYMBALTA    180 capsule    TAKE 1 CAPSULE TWICE DAILY    Recurrent major depressive disorder, in full remission (H)       levothyroxine 100 MCG tablet    SYNTHROID/LEVOTHROID    90 tablet    Take 1 tablet (100 mcg) by mouth daily    Hypothyroidism, unspecified type       lisinopril 40 MG tablet    PRINIVIL/ZESTRIL    90 tablet    TAKE 1 TABLET DAILY (DOSE  CHANGE)    Essential hypertension with goal blood pressure less than 130/80       metFORMIN 1000 MG tablet    GLUCOPHAGE    180 tablet    Take 1 tablet (1,000 mg) by mouth 2 times daily (with meals) with breakfast and dinner.    Type 2 diabetes, HbA1c goal < 7% (H)       simvastatin 40 MG tablet    ZOCOR    90 tablet    TAKE 1 TABLET AT BEDTIME    Hyperlipidemia LDL goal <100

## 2017-11-03 NOTE — PATIENT INSTRUCTIONS
- May increase lifting restriction to 20  pounds    - followup in 6 weeks  with xray prior     - Call the clinic at [phone] for increasing redness, swelling or pus draining from the incision, increased pain or any other questions and concerns.

## 2017-11-04 DIAGNOSIS — E11.9 TYPE 2 DIABETES, HBA1C GOAL < 7% (H): ICD-10-CM

## 2017-11-06 NOTE — TELEPHONE ENCOUNTER
metFORMIN (GLUCOPHAGE) 1000 MG tablet    Prescription approved per [facility].

## 2017-11-17 ENCOUNTER — DOCUMENTATION ONLY (OUTPATIENT)
Dept: NEUROSURGERY | Facility: CLINIC | Age: 47
End: 2017-11-17

## 2017-11-17 NOTE — PROGRESS NOTES
11/17/2017    Disability: yes    Faxed to [phone]     Type of form Medical information request    Placed a copy in the bin and sent the original to medical records

## 2017-12-15 ENCOUNTER — OFFICE VISIT (OUTPATIENT)
Dept: NEUROSURGERY | Facility: CLINIC | Age: 47
End: 2017-12-15
Attending: NURSE PRACTITIONER
Payer: COMMERCIAL

## 2017-12-15 ENCOUNTER — RADIANT APPOINTMENT (OUTPATIENT)
Dept: GENERAL RADIOLOGY | Facility: CLINIC | Age: 47
End: 2017-12-15
Attending: NURSE PRACTITIONER
Payer: COMMERCIAL

## 2017-12-15 VITALS
DIASTOLIC BLOOD PRESSURE: 89 MMHG | SYSTOLIC BLOOD PRESSURE: 129 MMHG | HEIGHT: 66 IN | WEIGHT: 206.2 LBS | HEART RATE: 88 BPM | OXYGEN SATURATION: 97 % | BODY MASS INDEX: 33.14 KG/M2

## 2017-12-15 DIAGNOSIS — Z98.1 STATUS POST CERVICAL SPINAL FUSION: Primary | ICD-10-CM

## 2017-12-15 DIAGNOSIS — Z98.1 STATUS POST CERVICAL SPINAL FUSION: ICD-10-CM

## 2017-12-15 PROCEDURE — 72040 X-RAY EXAM NECK SPINE 2-3 VW: CPT

## 2017-12-15 PROCEDURE — 99024 POSTOP FOLLOW-UP VISIT: CPT | Performed by: NURSE PRACTITIONER

## 2017-12-15 PROCEDURE — 99211 OFF/OP EST MAY X REQ PHY/QHP: CPT | Performed by: NURSE PRACTITIONER

## 2017-12-15 ASSESSMENT — PAIN SCALES - GENERAL: PAINLEVEL: NO PAIN (0)

## 2017-12-15 NOTE — LETTER
12/15/2017         RE: Natali Nicholson  [address]  [facility] [address]        Dear Colleague,    Thank you for referring your patient, Natali Nicholson, to the [facility] NEUROSURGERY CLINIC. Please see a copy of my visit note below.    [facility]  Neurosurgery followup:    HPI: Ms. Nicholson is a 47 year old female that returns almost 12 weeks post C4-5 and C5-6 ACDF. She reports no neck pain or radicular symptoms.   Prior to surgery she had right UE weakness. She now is happy to report full RUE strength.       Exam:  Constitutional:  Alert, well nourished, NAD.  HEENT: Normocephalic, atraumatic.   Pulm:  Without shortness of breath   CV:  No pitting edema of BLE.     Neurological:  Awake  Alert  Oriented x 3  Motor exam:     Shoulder Abduction:  Right:  5/5    Left:  5/5  Biceps:                      Right:  5/5    Left:  5/5  Triceps:                     Right:  5/5    Left:  5/5  Wrist Extensors:       Right:  5/5    Left:  5/5  Wrist Flexors:           Right:  5/5    Left:  5/5  Intrinsics:                  Right:  5/5    Left:  5/5     Able to spontaneously move U/E bilaterally  Sensation intact throughout all U/E dermatomes  Incisions:  Cervical incision healed  Imaging:  Cervical fusion intact     A/P:  Ms. Nicholson is a 47 year old female that returns almost 12 weeks post C4-5 and C5-6 ACDF. She reports no neck pain or radicular symptoms.   Prior to surgery she had right UE weakness. She now is happy to report full RUE strength.   She is back to work as well. We discussed restrictions and she will return in 3 months with a new xray.    Patient Instructions   - May increase lifting restriction to 30  Pounds and increase as tolerated.      - followup in 3 months  with xray prior     - Call the clinic at [phone] for increasing redness, swelling or pus draining from the incision, increased pain or any other questions and concerns.     Laurel Pryor CNP  [facility]  [address] [address]    Tel [phone]  Pager [phone]      Again, thank you for allowing me to participate in the care of your patient.        Sincerely,        JES Ness CNP

## 2017-12-15 NOTE — PROGRESS NOTES
[facility]  Neurosurgery followup:    HPI: Ms. Nicholson is a 47 year old female that returns almost 12 weeks post C4-5 and C5-6 ACDF. She reports no neck pain or radicular symptoms.  Prior to surgery she had right UE weakness. She now is happy to report full RUE strength.       Exam:  Constitutional:  Alert, well nourished, NAD.  HEENT: Normocephalic, atraumatic.   Pulm:  Without shortness of breath   CV:  No pitting edema of BLE.     Neurological:  Awake  Alert  Oriented x 3  Motor exam:     Shoulder Abduction:  Right:  5/5    Left:  5/5  Biceps:                      Right:  5/5    Left:  5/5  Triceps:                     Right:  5/5    Left:  5/5  Wrist Extensors:       Right:  5/5    Left:  5/5  Wrist Flexors:           Right:  5/5    Left:  5/5  Intrinsics:                  Right:  5/5    Left:  5/5     Able to spontaneously move U/E bilaterally  Sensation intact throughout all U/E dermatomes  Incisions:  Cervical incision healed  Imaging:  Cervical fusion intact     A/P:  Ms. Nicholson is a 47 year old female that returns almost 12 weeks post C4-5 and C5-6 ACDF. She reports no neck pain or radicular symptoms.  Prior to surgery she had right UE weakness. She now is happy to report full RUE strength.   She is back to work as well. We discussed restrictions and she will return in 3 months with a new xray.    Patient Instructions   - May increase lifting restriction to 30  Pounds and increase as tolerated.      - followup in 3 months  with xray prior     - Call the clinic at [phone] for increasing redness, swelling or pus draining from the incision, increased pain or any other questions and concerns.     Laurel Pryor [facility]  Spine and Brain Clinic  [address]  [address] [place] [address]    Tel [phone]  Pager [phone]

## 2017-12-15 NOTE — PATIENT INSTRUCTIONS
- May increase lifting restriction to 30  Pounds and increase as tolerated.      - followup in 3 months  with xray prior     - Call the clinic at [phone] for increasing redness, swelling or pus draining from the incision, increased pain or any other questions and concerns.

## 2017-12-15 NOTE — NURSING NOTE
"Natali Nicholson is a 47 year old female who presents for:  Chief Complaint   Patient presents with     Neurologic Problem     3 month follow up status post cervical fusion DOS 09/20/17        Initial Vitals:  /89 (BP Location: Right arm, Patient Position: Sitting, Cuff Size: Adult Large)  Pulse 88  Ht 5' 6\" (1.676 m)  Wt 206 lb 3.2 oz (93.5 kg)  SpO2 97%  BMI 33.28 kg/m2 Estimated body mass index is 33.28 kg/(m^2) as calculated from the following:    Height as of this encounter: 5' 6\" (1.676 m).    Weight as of this encounter: 206 lb 3.2 oz (93.5 kg).. Body surface area is 2.09 meters squared. BP completed using cuff size: large  No Pain (0)    Do you feel safe in your environment?  Yes  Do you need any refills today? No    Nursing Comments: 3 month follow up status post cervical fusion DOS 09/20/17.        5 min. nursing intake time  Mary Rodríguez MA       Discharge plan: see providers dictation  2 min. nursing discharge time  Mary Rodríugez MA        "

## 2017-12-15 NOTE — MR AVS SNAPSHOT
After Visit Summary   12/15/2017    Natali Nicholson    MRN: [id number]           Patient Information     Date Of Birth          1970        Visit Information        Provider Department      12/15/2017 11:40 AM Laurel Pryor APRN CNP [facility] Neurosurgery [facility]        Today's Diagnoses     Status post cervical spinal fusion    -  1      Care Instructions    - May increase lifting restriction to 30  Pounds and increase as tolerated.      - followup in 3 months  with xray prior     - Call the clinic at [phone] for increasing redness, swelling or pus draining from the incision, increased pain or any other questions and concerns.          Follow-ups after your visit        Your next 10 appointments already scheduled     Dec 15, 2017 11:40 AM CST   Return Visit with JES Ness CNP   [facility]surgery [facility] ([facility])    [address] [place] [id number] [phone]              Future tests that were ordered for you today     Open Future Orders        Priority Expected Expires Ordered    XR Cervical Spine 2/3 Views Routine 3/15/2018 12/15/2018 12/15/2017            Who to contact     If you have questions or need follow up information about today's clinic visit or your schedule please contact [facility] NEUROSURGERY [facility] directly at [phone].  Normal or non-critical lab and imaging results will be communicated to you by MyChart, letter or phone within 4 business days after the clinic has received the results. If you do not hear from us within 7 days, please contact the clinic through MyChart or phone. If you have a critical or abnormal lab result, we will notify you by phone as soon as possible.  Submit refill requests through [employer] or call your pharmacy and they will forward the refill request to us. Please allow 3 business days for your refill to be completed.          Additional Information  "About Your Visit        MyChart Information     [facility] gives you secure access to your electronic health record. If you see a primary care provider, you can also send messages to your care team and make appointments. If you have questions, please call your primary care clinic.  If you do not have a primary care provider, please call [phone] and they will assist you.        Care EveryWhere ID     This is your Care EveryWhere ID. This could be used by other organizations to access your [facility] medical records  [id number]        Your Vitals Were     Pulse Height Pulse Oximetry BMI (Body Mass Index)          88 5' 6\" (1.676 m) 97% 33.28 kg/m2         Blood Pressure from Last 3 Encounters:   12/15/17 129/89   11/03/17 118/85   10/06/17 128/70    Weight from Last 3 Encounters:   12/15/17 206 lb 3.2 oz (93.5 kg)   11/03/17 203 lb (92.1 kg)   10/06/17 202 lb (91.6 kg)               Primary Care Provider Office Phone # Fax #    Pablo Gomez [phone]       [address] [facility] [address]        Equal Access to Services     HERIBERTO MELÉNDEZ AH: Hadii aad ku hadasho Soomaali, waaxda luqadaha, qaybta kaalmada adeegyada, waxay idiin hayjohannn garryeg miniarajordan la'aan ah. So [facility] [phone].    ATENCIÓN: Si habla español, tiene a crawley disposición servicios gratuitos de asistencia lingüística. [facility] [phone].    We comply with applicable federal civil rights laws and [place] laws. We do not discriminate on the basis of race, color, national origin, age, disability, sex, sexual orientation, or gender identity.            Thank you!     Thank you for choosing [facility] NEUROSURGERY [facility]  for your care. Our goal is always to provide you with excellent care. Hearing back from our patients is one way we can continue to improve our services. Please take a few minutes to complete the written survey that you may receive in the mail after your visit with us. Thank you!             Your Updated " Medication List - Protect others around you: Learn how to safely use, store and throw away your medicines at www.disposemymeds.org.          This list is accurate as of: 12/15/17 11:07 AM.  Always use your most recent med list.                   Brand Name Dispense Instructions for use Diagnosis    blood glucose monitoring lancets     1 Box    Use to test blood sugars 1 times daily or as directed. Reference #[id number]    Type 2 diabetes mellitus without complication, without long-term current use of insulin (H)       blood glucose monitoring meter device kit     1 kit    Use to test blood sugar 1 times daily or as directed.  Reference #[id number]    Type 2 diabetes mellitus without complication, without long-term current use of insulin (H)       blood glucose monitoring test strip    ONETOUCH VERIO IQ    100 each    Use to test blood sugars 1 times daily or as directed.  Reference #[id number]    Type 2 diabetes mellitus without complication, without long-term current use of insulin (H)       DULoxetine 30 MG EC capsule    CYMBALTA    180 capsule    TAKE 1 CAPSULE TWICE DAILY    Recurrent major depressive disorder, in full remission (H)       levothyroxine 100 MCG tablet    SYNTHROID/LEVOTHROID    90 tablet    Take 1 tablet (100 mcg) by mouth daily    Hypothyroidism, unspecified type       lisinopril 40 MG tablet    PRINIVIL/ZESTRIL    90 tablet    TAKE 1 TABLET DAILY (DOSE  CHANGE)    Essential hypertension with goal blood pressure less than 130/80       metFORMIN 1000 MG tablet    GLUCOPHAGE    180 tablet    TAKE 1 TABLET TWICE A DAY  WITH BREAKFAST AND DINNER    Type 2 diabetes, HbA1c goal < 7% (H)       simvastatin 40 MG tablet    ZOCOR    90 tablet    TAKE 1 TABLET AT BEDTIME    Hyperlipidemia LDL goal <100

## 2018-03-16 ENCOUNTER — OFFICE VISIT (OUTPATIENT)
Dept: NEUROSURGERY | Facility: CLINIC | Age: 48
End: 2018-03-16
Attending: NURSE PRACTITIONER
Payer: COMMERCIAL

## 2018-03-16 ENCOUNTER — RADIANT APPOINTMENT (OUTPATIENT)
Dept: GENERAL RADIOLOGY | Facility: CLINIC | Age: 48
End: 2018-03-16
Attending: NURSE PRACTITIONER
Payer: COMMERCIAL

## 2018-03-16 VITALS
OXYGEN SATURATION: 97 % | WEIGHT: 206 LBS | HEIGHT: 66 IN | SYSTOLIC BLOOD PRESSURE: 143 MMHG | DIASTOLIC BLOOD PRESSURE: 97 MMHG | HEART RATE: 78 BPM | BODY MASS INDEX: 33.11 KG/M2

## 2018-03-16 DIAGNOSIS — Z98.1 STATUS POST CERVICAL SPINAL FUSION: ICD-10-CM

## 2018-03-16 DIAGNOSIS — Z98.1 STATUS POST CERVICAL SPINAL FUSION: Primary | ICD-10-CM

## 2018-03-16 PROCEDURE — 72040 X-RAY EXAM NECK SPINE 2-3 VW: CPT

## 2018-03-16 PROCEDURE — G0463 HOSPITAL OUTPT CLINIC VISIT: HCPCS | Performed by: NURSE PRACTITIONER

## 2018-03-16 PROCEDURE — 99213 OFFICE O/P EST LOW 20 MIN: CPT | Performed by: NURSE PRACTITIONER

## 2018-03-16 ASSESSMENT — PAIN SCALES - GENERAL: PAINLEVEL: NO PAIN (0)

## 2018-03-16 NOTE — PROGRESS NOTES
Spine [facility] Brain Clinic  Neurosurgery followup:    HPI: Ms. Nicholson is a 47 year old female that returns almost 12 weeks post C4-5 and C5-6 ACDF. She reports no neck pain or radicular symptoms.  She is back to work full time and denies any further right UE weakness.     Exam:  Constitutional:  Alert, well nourished, NAD.  HEENT: Normocephalic, atraumatic.   Pulm:  Without shortness of breath   CV:  No pitting edema of BLE.     Neurological:  Awake  Alert  Oriented x 3  Motor exam:     Shoulder Abduction:  Right:  5/5    Left:  5/5  Biceps:                      Right:  5/5    Left:  5/5  Triceps:                     Right:  5/5    Left:  5/5  Wrist Extensors:       Right:  5/5    Left:  5/5  Wrist Flexors:           Right:  5/5    Left:  5/5  Intrinsics:                  Right:  5/5    Left:  5/5     Able to spontaneously move U/E bilaterally  Sensation intact throughout all U/E dermatomes  Incisions:  Cervical incision healed   Imaging:  Cervical fusion intact per xray   A/P: Ms. Nicholson is a 47 year old female that returns almost 12 weeks post C4-5 and C5-6 ACDF. She reports no neck pain or radicular symptoms.  She is back to work full time and denies any further right UE weakness. We will have her return in 6 months for follow up.  She agreed to POC.     Patient Instructions   - Continue activity     - followup in  6 months  with xray prior     - Call the clinic at [phone] for increased pain or any other questions and concerns.               Laurel Pryor [facility]  Spine and Brain Clinic  [address]  [address] [place] [address]    Tel [phone]  Pager [phone]

## 2018-03-16 NOTE — PATIENT INSTRUCTIONS
- Continue activity     - followup in  6 months  with xray prior     - Call the clinic at [phone] for increased pain or any other questions and concerns.

## 2018-03-16 NOTE — NURSING NOTE
"Natali Nicholson is a 47 year old female who presents for:  Chief Complaint   Patient presents with     Neurologic Problem     6 month follow up status post cervical fusion DOS 09/20/17        Initial Vitals:  BP (!) 143/97 (BP Location: Right arm, Patient Position: Sitting, Cuff Size: Adult Large)  Pulse 78  Ht 5' 6\" (1.676 m)  Wt 206 lb (93.4 kg)  SpO2 97%  BMI 33.25 kg/m2 Estimated body mass index is 33.25 kg/(m^2) as calculated from the following:    Height as of this encounter: 5' 6\" (1.676 m).    Weight as of this encounter: 206 lb (93.4 kg).. Body surface area is 2.09 meters squared. BP completed using cuff size: large  No Pain (0)    Do you feel safe in your environment?  Yes  Do you need any refills today? No    Nursing Comments: 6 month follow up status post cervical fusion DOS 09/20/17.        5 min. nursing intake time  Mary Rodríguez MA       Discharge plan: - Continue activity     - followup in  6 months  with xray prior     - Call the clinic at [phone] for increased pain or any other questions and concerns.  2 min. nursing discharge time  Mary Rodríguez MA        "

## 2018-03-16 NOTE — LETTER
3/16/2018         RE: Natali Nicholson  [address]  [facility] [address]        Dear Colleague,    Thank you for referring your patient, Natali Nicholson, to the [place]. Please see a copy of my visit note below.    Spine and Brain Clinic  Neurosurgery followup:    HPI: Ms. Nicholson is a 47 year old female that returns almost 12 weeks post C4-5 and C5-6 ACDF. She reports no neck pain or radicular symptoms.  She is back to work full time and denies any further right UE weakness.     Exam:  Constitutional:  Alert, well nourished, NAD.  HEENT: Normocephalic, atraumatic.   Pulm:  Without shortness of breath   CV:  No pitting edema of BLE.     Neurological:  Awake  Alert  Oriented x 3  Motor exam:     Shoulder Abduction:  Right:  5/5    Left:  5/5  Biceps:                      Right:  5/5    Left:  5/5  Triceps:                     Right:  5/5    Left:  5/5  Wrist Extensors:       Right:  5/5    Left:  5/5  Wrist Flexors:           Right:  5/5    Left:  5/5  Intrinsics:                  Right:  5/5    Left:  5/5     Able to spontaneously move U/E bilaterally  Sensation intact throughout all U/E dermatomes  Incisions:  Cervical incision healed   Imaging:  Cervical fusion intact per xray   A/P: Ms. Nicholson is a 47 year old female that returns almost 12 weeks post C4-5 and C5-6 ACDF. She reports no neck pain or radicular symptoms.  She is back to work full time and denies any further right UE weakness. We will have her return in 6 months for follow up.  She agreed to POC.     Patient Instructions   - Continue activity     - followup in  6 months  with xray prior     - Call the clinic at [phone] for increased pain or any other questions and concerns.               Laurel Pryor [facility]  Spine and Brain Clinic  [address]  [address] [place] [address]    Tel [phone]  Pager [phone]      Again, thank you for allowing me to  participate in the care of your patient.        Sincerely,        JES Ness CNP

## 2018-03-16 NOTE — MR AVS SNAPSHOT
After Visit Summary   3/16/2018    Natali Nicholson    MRN: [id number]           Patient Information     Date Of Birth          1970        Visit Information        Provider Department      3/16/2018 2:20 PM Laurel Pryor APRN CNP [facility]        Today's Diagnoses     Status post cervical spinal fusion    -  1      Care Instructions    - Continue activity     - followup in  6 months  with xray prior     - Call the clinic at [phone] for increased pain or any other questions and concerns.          Follow-ups after your visit        Your next 10 appointments already scheduled     Mar 16, 2018  2:20 PM CDT   Return Visit with JES Ness CNP   [facility] ([facility])    [address] [facility] [address] [place] [id number] [phone]              Future tests that were ordered for you today     Open Future Orders        Priority Expected Expires Ordered    XR Cervical Spine 2/3 Views Routine 9/16/2018 3/16/2019 3/16/2018            Who to contact     If you have questions or need follow up information about today's clinic visit or your schedule please contact [place] [facility] directly at [phone].  Normal or non-critical lab and imaging results will be communicated to you by MyChart, letter or phone within 4 business days after the clinic has received the results. If you do not hear from us within 7 days, please contact the clinic through MyChart or phone. If you have a critical or abnormal lab result, we will notify you by phone as soon as possible.  Submit refill requests through [employer] or call your pharmacy and they will forward the refill request to us. Please allow 3 business days for your refill to be completed.          Additional Information About Your Visit        MyChart Information     [employer] gives you secure access to your electronic health record. If you see a primary  "care provider, you can also send messages to your care team and make appointments. If you have questions, please call your primary care clinic.  If you do not have a primary care provider, please call [phone] and they will assist you.        Care EveryWhere ID     This is your Care EveryWhere ID. This could be used by other organizations to access your [place] medical records  [id number]        Your Vitals Were     Pulse Height Pulse Oximetry BMI (Body Mass Index)          78 5' 6\" (1.676 m) 97% 33.25 kg/m2         Blood Pressure from Last 3 Encounters:   03/16/18 (!) 143/97   12/15/17 129/89   11/03/17 118/85    Weight from Last 3 Encounters:   03/16/18 206 lb (93.4 kg)   12/15/17 206 lb 3.2 oz (93.5 kg)   11/03/17 203 lb (92.1 kg)               Primary Care Provider Office Phone # Fax #    Pablo Gomez [phone]       [address] [place] [address]        Equal Access to Services     [facility]: Hadii aad ku hadasho Soomaali, waaxda luqadaha, qaybta kaalmada ademecca, tracy simmons . So [facility] [phone].    ATENCIÓN: Si habla español, tiene a crawley disposición servicios gratuitos de asistencia lingüística. Chen al [phone].    We comply with applicable federal civil rights laws and [place] laws. We do not discriminate on the basis of race, color, national origin, age, disability, sex, sexual orientation, or gender identity.            Thank you!     Thank you for choosing [facility]  for your care. Our goal is always to provide you with excellent care. Hearing back from our patients is one way we can continue to improve our services. Please take a few minutes to complete the written survey that you may receive in the mail after your visit with us. Thank you!             Your Updated Medication List - Protect others around you: Learn how to safely use, store and throw away your medicines at www.disposemymeds.org.          This " list is accurate as of 3/16/18  1:56 PM.  Always use your most recent med list.                   Brand Name Dispense Instructions for use Diagnosis    blood glucose monitoring lancets     1 Box    Use to test blood sugars 1 times daily or as directed. Reference #[id number]    Type 2 diabetes mellitus without complication, without long-term current use of insulin (H)       blood glucose monitoring meter device kit     1 kit    Use to test blood sugar 1 times daily or as directed.  Reference #[id number]    Type 2 diabetes mellitus without complication, without long-term current use of insulin (H)       blood glucose monitoring test strip    ONETOUCH VERIO IQ    100 each    Use to test blood sugars 1 times daily or as directed.  Reference #[id number]    Type 2 diabetes mellitus without complication, without long-term current use of insulin (H)       DULoxetine 30 MG EC capsule    CYMBALTA    180 capsule    TAKE 1 CAPSULE TWICE DAILY    Recurrent major depressive disorder, in full remission (H)       levothyroxine 100 MCG tablet    SYNTHROID/LEVOTHROID    90 tablet    Take 1 tablet (100 mcg) by mouth daily    Hypothyroidism, unspecified type       lisinopril 40 MG tablet    PRINIVIL/ZESTRIL    90 tablet    TAKE 1 TABLET DAILY (DOSE  CHANGE)    Essential hypertension with goal blood pressure less than 130/80       metFORMIN 1000 MG tablet    GLUCOPHAGE    180 tablet    TAKE 1 TABLET TWICE A DAY  WITH BREAKFAST AND DINNER    Type 2 diabetes, HbA1c goal < 7% (H)       simvastatin 40 MG tablet    ZOCOR    90 tablet    TAKE 1 TABLET AT BEDTIME    Hyperlipidemia LDL goal <100

## 2018-04-10 DIAGNOSIS — E11.9 TYPE 2 DIABETES MELLITUS WITHOUT COMPLICATION, WITHOUT LONG-TERM CURRENT USE OF INSULIN (H): ICD-10-CM

## 2018-04-10 DIAGNOSIS — E03.9 HYPOTHYROIDISM, UNSPECIFIED TYPE: ICD-10-CM

## 2018-04-10 LAB
HBA1C MFR BLD: 6.8 % (ref 0–6.4)
TSH SERPL DL<=0.005 MIU/L-ACNC: 2.63 MU/L (ref 0.4–4)

## 2018-04-10 PROCEDURE — 84443 ASSAY THYROID STIM HORMONE: CPT | Performed by: INTERNAL MEDICINE

## 2018-04-10 PROCEDURE — 83036 HEMOGLOBIN GLYCOSYLATED A1C: CPT | Performed by: INTERNAL MEDICINE

## 2018-04-10 PROCEDURE — 36415 COLL VENOUS BLD VENIPUNCTURE: CPT | Performed by: INTERNAL MEDICINE

## 2018-04-13 ENCOUNTER — OFFICE VISIT (OUTPATIENT)
Dept: INTERNAL MEDICINE | Facility: CLINIC | Age: 48
End: 2018-04-13
Payer: COMMERCIAL

## 2018-04-13 VITALS
SYSTOLIC BLOOD PRESSURE: 142 MMHG | HEART RATE: 84 BPM | RESPIRATION RATE: 16 BRPM | DIASTOLIC BLOOD PRESSURE: 94 MMHG | WEIGHT: 206 LBS | BODY MASS INDEX: 33.25 KG/M2 | TEMPERATURE: 98.8 F

## 2018-04-13 DIAGNOSIS — E03.9 HYPOTHYROIDISM, UNSPECIFIED TYPE: ICD-10-CM

## 2018-04-13 DIAGNOSIS — Z12.31 ENCOUNTER FOR SCREENING MAMMOGRAM FOR BREAST CANCER: ICD-10-CM

## 2018-04-13 DIAGNOSIS — E78.5 HYPERLIPIDEMIA LDL GOAL <100: ICD-10-CM

## 2018-04-13 DIAGNOSIS — I10 ESSENTIAL HYPERTENSION WITH GOAL BLOOD PRESSURE LESS THAN 130/80: ICD-10-CM

## 2018-04-13 DIAGNOSIS — E11.9 TYPE 2 DIABETES MELLITUS WITHOUT COMPLICATION, WITHOUT LONG-TERM CURRENT USE OF INSULIN (H): ICD-10-CM

## 2018-04-13 DIAGNOSIS — F32.0 MILD MAJOR DEPRESSION (H): ICD-10-CM

## 2018-04-13 PROCEDURE — 99214 OFFICE O/P EST MOD 30 MIN: CPT | Performed by: INTERNAL MEDICINE

## 2018-04-13 RX ORDER — LEVOTHYROXINE SODIUM 100 UG/1
100 TABLET ORAL DAILY
Qty: 90 TABLET | Refills: 3 | Status: SHIPPED | OUTPATIENT
Start: 2018-04-13 | End: 2018-11-05

## 2018-04-13 ASSESSMENT — PAIN SCALES - GENERAL: PAINLEVEL: NO PAIN (0)

## 2018-04-13 NOTE — PATIENT INSTRUCTIONS
Restart Lisinopril 1 tab daily for blood pressure  Continue other medications. RFs done  See me in 1 month for blood pressure recheck  Fastring labs in 3 months  Eye exam  Mammogram - Robbin

## 2018-04-13 NOTE — PROGRESS NOTES
SUBJECTIVE:   Natali Nicholson is a 47 year old female who presents to clinic today for the following health issues:  Chief Complaint   Patient presents with     Thyroid Problem     Diabetes     Hypertension     Hyperlipidemia         Diabetes Follow-up      Patient is checking blood sugars: not at all    Diabetic concerns: None     Symptoms of hypoglycemia (low blood sugar): none     Paresthesias (numbness or burning in feet) or sores: No     Date of last diabetic eye exam: 09/2016    Hyperlipidemia Follow-Up      Rate your low fat/cholesterol diet?: not monitoring fat    Taking statin?  Yes, no muscle aches from statin    Other lipid medications/supplements?:  none    Hypertension Follow-up      Outpatient blood pressures are not being checked.    Low Salt Diet: no added salt    BP Readings from Last 2 Encounters:   03/16/18 (!) 143/97   12/15/17 129/89     Hemoglobin A1C (%)   Date Value   04/10/2018 6.8 (H)   08/15/2017 7.2 (H)     LDL Cholesterol Calculated (mg/dL)   Date Value   08/15/2017 76   11/08/2016 68     Hypothyroidism Follow-up      Since last visit, patient describes the following symptoms: Weight stable, no hair loss, no skin changes, no constipation, no loose stools      Amount of exercise or physical activity: 2-3 days/week for an average of 30-45 minutes    Problems taking medications regularly: No    Medication side effects: none    Diet: regular (no restrictions) and no salt      Pt's past medical history, family history, habits, medications and allergies were reviewed with the patient today.  See snap shot for  HCM status. Most recent lab results reviewed with pt. Problem list and histories reviewed & adjusted, as indicated.  Additional history as below:    Denies CP, SOB, abdominal pain, polyuria, polydipsia, vision changes, extremity numbness/parasthesias or skin problems.  Neck doing well after previous  Surgery  HAs been off of Lisinopril after having issues with [employer]. Just got  RF  "now yesterday after being off for the past 6 weeks per pt and did not take dose yet today    Lab Results   Component Value Date     08/15/2017     Lab Results   Component Value Date    A1C 6.8 04/10/2018     Lab Results   Component Value Date    CHOL 155 08/15/2017     Lab Results   Component Value Date    LDL 76 08/15/2017     Lab Results   Component Value Date    HDL 41 08/15/2017     Lab Results   Component Value Date    TRIG 188 08/15/2017     Lab Results   Component Value Date    CR 0.66 09/20/2017     Lab Results   Component Value Date    ALT 52 08/15/2017     Lab Results   Component Value Date    AST 28 08/15/2017     Lab Results   Component Value Date    MICROL 11 08/15/2017     Lab Results   Component Value Date    TSH 2.63 04/10/2018     Mood good. PHQ=0     Additional ROS:   Constitutional, HEENT, Cardiovascular, Pulmonary, GI and , Neuro, MSK and Psych review of systems/symptoms are otherwise negative or unchanged from previous, except as noted above.      OBJECTIVE:  BP (!) 142/94  Pulse 84  Temp 98.8  F (37.1  C) (Oral)  Resp 16  Wt 206 lb (93.4 kg)  LMP 03/26/2018 (Exact Date)  BMI 33.25 kg/m2   Estimated body mass index is 33.25 kg/(m^2) as calculated from the following:    Height as of 3/16/18: 5' 6\" (1.676 m).    Weight as of this encounter: 206 lb (93.4 kg).  Eye: PERRL, EOMI  HENT: ear canals and TM's normal and nose and mouth without ulcers or lesions   Neck: no adenopathy. Thyroid normal to palpation. No bruits  Pulm: Lungs clear to auscultation   CV: Regular rates and rhythm  GI: Soft, mildly obese, nontender, Normal active bowel sounds, No hepatosplenomegaly or masses palpable  Ext: Peripheral pulses intact. No edema.  Neuro: Normal strength and tone, sensory exam grossly normal    Assessment/Plan: (See plan discussion below for further details)  1. Type 2 diabetes mellitus without complication, without long-term current use of insulin (H)   Controlled. See plan discussion " below.   - blood glucose monitoring (ONETOUCH VERIO IQ) test strip; Use to test blood sugars 1 times daily or as directed.  Reference #[id number]  Dispense: 100 each; Refill: 3  - Hemoglobin A1c; Future  - Comprehensive metabolic panel; Future  - Lipid panel reflex to direct LDL Fasting; Future  - Albumin Random Urine Quantitative with Creat Ratio; Future  - metFORMIN (GLUCOPHAGE) 1000 MG tablet; TAKE 1 TABLET TWICE A DAY  WITH BREAKFAST AND DINNER  Dispense: 180 tablet; Refill: 3    2. Essential hypertension with goal blood pressure less than 130/80  Uncontrolled but has been off of Lisinopril. Just got Rx again for [employer]. Pt to restart med    3. Mild major depression (H)  controlled    4. Hyperlipidemia LDL goal <100  Continue statin. Repeat labs 3 mos  - Comprehensive metabolic panel; Future  - Lipid panel reflex to direct LDL Fasting; Future    5. Encounter for screening mammogram for breast cancer  Due for mammogram  - *MA Screening Digital Bilateral; Future    6. Hypothyroidism, unspecified type  Controlled.  - levothyroxine (SYNTHROID/LEVOTHROID) 100 MCG tablet; Take 1 tablet (100 mcg) by mouth daily  Dispense: 90 tablet; Refill: 3  - TSH with free T4 reflex; Future      Plan discussion:  Restart Lisinopril 1 tab daily for blood pressure  Continue other medications. RFs done  See me in 1 month for blood pressure recheck  Fastring labs in 3 months  Eye exam  Mammogram - Robbin Gomez MD  Internal Medicine Department  [facility]

## 2018-04-13 NOTE — MR AVS SNAPSHOT
After Visit Summary   4/13/2018    Natali Nicholson    MRN: [id number]           Patient Information     Date Of Birth          1970        Visit Information        Provider Department      4/13/2018 8:30 AM Pablo Gomez MD [facility]        Today's Diagnoses     Encounter for screening mammogram for breast cancer    -  1    Type 2 diabetes mellitus without complication, without long-term current use of insulin (H)          Care Instructions    Restart Lisinopril 1 tab daily for blood pressure  Continue other medications  See me in 1 month for blood pressure recheck  Fastring labs in 3 months  Eye exam  Mammogram - [facility]          Follow-ups after your visit        Future tests that were ordered for you today     Open Future Orders        Priority Expected Expires Ordered    Albumin Random Urine Quantitative with Creat Ratio Routine 7/12/2018 3/9/2019 4/13/2018    Hemoglobin A1c Routine 7/12/2018 4/13/2019 4/13/2018    Comprehensive metabolic panel Routine 7/12/2018 4/13/2019 4/13/2018    Lipid panel reflex to direct LDL Fasting Routine 7/12/2018 4/13/2019 4/13/2018    *MA Screening Digital Bilateral Routine  4/13/2019 4/13/2018            Who to contact     If you have questions or need follow up information about today's clinic visit or your schedule please contact [facility] directly at [phone].  Normal or non-critical lab and imaging results will be communicated to you by MyChart, letter or phone within 4 business days after the clinic has received the results. If you do not hear from us within 7 days, please contact the clinic through MyChart or phone. If you have a critical or abnormal lab result, we will notify you by phone as soon as possible.  Submit refill requests through [email or web address] or call your pharmacy and they will forward the refill request to us. Please allow 3 business days for your refill to be completed.          Additional  Information About Your Visit        MyChart Information     [employer] gives you secure access to your electronic health record. If you see a primary care provider, you can also send messages to your care team and make appointments. If you have questions, please call your primary care clinic.  If you do not have a primary care provider, please call [phone] and they will assist you.        Care EveryWhere ID     This is your Care EveryWhere ID. This could be used by other organizations to access your [place] medical records  [id number]        Your Vitals Were     Pulse Temperature Respirations Last Period BMI (Body Mass Index)       84 98.8  F (37.1  C) (Oral) 16 03/26/2018 (Exact Date) 33.25 kg/m2        Blood Pressure from Last 3 Encounters:   04/13/18 (!) 142/94   03/16/18 (!) 143/97   12/15/17 129/89    Weight from Last 3 Encounters:   04/13/18 206 lb (93.4 kg)   03/16/18 206 lb (93.4 kg)   12/15/17 206 lb 3.2 oz (93.5 kg)                 Where to get your medicines      These medications were sent to [facility] - [place], [place] - [address] AT [address] [facility]  [address] [facility], [facility] [address]     Phone:  [phone]     blood glucose monitoring test strip          Primary Care Provider Office Phone # Fax #    Pablo Gomez [phone]       [address] [place] [id number]        Equal Access to Services     FATOU MELÉNDEZ AH: Hadii aad ku hadasho Soomaali, waaxda luqadaha, qaybta kaalmada adeegyada, tracy sesay. So [facility] [phone].    ATENCIÓN: Si habla español, tiene a crawley disposición servicios gratuitos de asistencia lingüística. Llame al [phone].    We comply with applicable federal civil rights laws and [place] laws. We do not discriminate on the basis of race, color, national origin, age, disability, sex, sexual orientation, or gender identity.            Thank you!     Thank you for choosing  [facility]  for your care. Our goal is always to provide you with excellent care. Hearing back from our patients is one way we can continue to improve our services. Please take a few minutes to complete the written survey that you may receive in the mail after your visit with us. Thank you!             Your Updated Medication List - Protect others around you: Learn how to safely use, store and throw away your medicines at www.disposemymeds.org.          This list is accurate as of 4/13/18  8:53 AM.  Always use your most recent med list.                   Brand Name Dispense Instructions for use Diagnosis    blood glucose monitoring lancets     1 Box    Use to test blood sugars 1 times daily or as directed. Reference #[id number]    Type 2 diabetes mellitus without complication, without long-term current use of insulin (H)       blood glucose monitoring meter device kit     1 kit    Use to test blood sugar 1 times daily or as directed.  Reference #[id number]    Type 2 diabetes mellitus without complication, without long-term current use of insulin (H)       blood glucose monitoring test strip    ONETOUCH VERIO IQ    100 each    Use to test blood sugars 1 times daily or as directed.  Reference #[id number]    Type 2 diabetes mellitus without complication, without long-term current use of insulin (H)       DULoxetine 30 MG EC capsule    CYMBALTA    180 capsule    TAKE 1 CAPSULE TWICE DAILY    Recurrent major depressive disorder, in full remission (H)       levothyroxine 100 MCG tablet    SYNTHROID/LEVOTHROID    90 tablet    Take 1 tablet (100 mcg) by mouth daily    Hypothyroidism, unspecified type       lisinopril 40 MG tablet    PRINIVIL/ZESTRIL    90 tablet    TAKE 1 TABLET DAILY (DOSE  CHANGE)    Essential hypertension with goal blood pressure less than 130/80       metFORMIN 1000 MG tablet    GLUCOPHAGE    180 tablet    TAKE 1 TABLET TWICE A DAY  WITH BREAKFAST AND DINNER    Type 2 diabetes,  HbA1c goal < 7% (H)       simvastatin 40 MG tablet    ZOCOR    90 tablet    TAKE 1 TABLET AT BEDTIME    Hyperlipidemia LDL goal <100

## 2018-04-14 ASSESSMENT — PATIENT HEALTH QUESTIONNAIRE - PHQ9: SUM OF ALL RESPONSES TO PHQ QUESTIONS 1-9: 0

## 2018-07-15 DIAGNOSIS — E78.5 HYPERLIPIDEMIA LDL GOAL <100: ICD-10-CM

## 2018-07-16 NOTE — TELEPHONE ENCOUNTER
"Requested Prescriptions   Pending Prescriptions Disp Refills     simvastatin (ZOCOR) 40 MG tablet [Pharmacy Med Name: SIMVASTATIN  TAB 40MG] 90 tablet 2     Sig: TAKE 1 TABLET AT BEDTIME    Statins Protocol Passed    7/15/2018 12:39 PM       Passed - LDL on file in past 12 months    Recent Labs   Lab Test  08/15/17   1001   LDL  76            Passed - No abnormal creatine kinase in past 12 months    Recent Labs   Lab Test  11/08/16   1009   CKT  136               Passed - Recent (12 mo) or future (30 days) visit within the authorizing provider's specialty    Patient had office visit in the last 12 months or has a visit in the next 30 days with authorizing provider or within the authorizing provider's specialty.  See \"Patient Info\" tab in inbasket, or \"Choose Columns\" in Meds & Orders section of the refill encounter.           Passed - Patient is age 18 or older       Passed - No active pregnancy on record       Passed - No positive pregnancy test in past 12 months        Last Written Prescription Date:  10/4/17  Last Fill Quantity: 90,  # refills: 2   Last office visit: 4/13/2018 with prescribing provider:  4/13/18   Future Office Visit:      "

## 2018-07-17 RX ORDER — SIMVASTATIN 40 MG
TABLET ORAL
Qty: 90 TABLET | Refills: 0 | Status: SHIPPED | OUTPATIENT
Start: 2018-07-17 | End: 2018-09-28

## 2018-09-14 DIAGNOSIS — E03.9 HYPOTHYROIDISM, UNSPECIFIED TYPE: ICD-10-CM

## 2018-09-14 RX ORDER — LEVOTHYROXINE SODIUM 100 MCG
TABLET ORAL
Qty: 90 TABLET | Refills: 1 | Status: SHIPPED | OUTPATIENT
Start: 2018-09-14 | End: 2018-11-05

## 2018-09-14 NOTE — TELEPHONE ENCOUNTER
"Requested Prescriptions   Pending Prescriptions Disp Refills     SYNTHROID 100 MCG tablet [Pharmacy Med Name: SYNTHROID TAB 0.1MG] 90 tablet 3     Sig: TAKE 1 TABLET DAILY (NOTE  DOSE CHANGE: STOP REFILLS  OF THE 88MCG TABLET)    Thyroid Protocol Passed    9/14/2018  7:40 AM       Passed - Patient is 12 years or older       Passed - Recent (12 mo) or future (30 days) visit within the authorizing provider's specialty    Patient had office visit in the last 12 months or has a visit in the next 30 days with authorizing provider or within the authorizing provider's specialty.  See \"Patient Info\" tab in inbasket, or \"Choose Columns\" in Meds & Orders section of the refill encounter.           Passed - Normal TSH on file in past 12 months    Recent Labs   Lab Test  04/10/18   0835   TSH  2.63             Passed - No active pregnancy on record    If patient is pregnant or has had a positive pregnancy test, please check TSH.         Passed - No positive pregnancy test in past 12 months    If patient is pregnant or has had a positive pregnancy test, please check TSH.            "

## 2018-09-28 DIAGNOSIS — F33.42 RECURRENT MAJOR DEPRESSIVE DISORDER, IN FULL REMISSION (H): ICD-10-CM

## 2018-09-28 DIAGNOSIS — I10 ESSENTIAL HYPERTENSION WITH GOAL BLOOD PRESSURE LESS THAN 130/80: ICD-10-CM

## 2018-09-28 DIAGNOSIS — E78.5 HYPERLIPIDEMIA LDL GOAL <100: ICD-10-CM

## 2018-09-28 RX ORDER — DULOXETIN HYDROCHLORIDE 30 MG/1
CAPSULE, DELAYED RELEASE ORAL
Qty: 180 CAPSULE | Refills: 0 | Status: SHIPPED | OUTPATIENT
Start: 2018-09-28 | End: 2018-11-05

## 2018-09-28 RX ORDER — SIMVASTATIN 40 MG
TABLET ORAL
Qty: 90 TABLET | Refills: 0 | Status: SHIPPED | OUTPATIENT
Start: 2018-09-28 | End: 2018-11-05

## 2018-09-28 RX ORDER — LISINOPRIL 40 MG/1
TABLET ORAL
Qty: 90 TABLET | Refills: 0 | Status: SHIPPED | OUTPATIENT
Start: 2018-09-28 | End: 2018-11-05

## 2018-09-28 NOTE — TELEPHONE ENCOUNTER
Pt overdue for fasting labs and f/u appt with me in clinic re: elevated blood pressure, etc. RFs done for 90 days only. Assist pt with scheduling fasting lab appt in the next 4-6 weeks and an appt to see me a few days after labs done. Will address more longterm refills of meds after seen back in clinic

## 2018-09-28 NOTE — TELEPHONE ENCOUNTER
"Requested Prescriptions   Pending Prescriptions Disp Refills     lisinopril (PRINIVIL/ZESTRIL) 40 MG tablet [Pharmacy Med Name: LISINOPRIL TAB 40MG] 90 tablet 3    Last Written Prescription Date:  10/4/2017  Last Fill Quantity: 90,  # refills: 3   Last office visit: 4/13/2018 with prescribing provider:  4/13/2018   Future Office Visit:     Sig: TAKE 1 TABLET DAILY (DOSE  CHANGE)    ACE Inhibitors (Including Combos) Protocol Failed    9/28/2018  5:06 AM       Failed - Blood pressure under 140/90 in past 12 months    BP Readings from Last 3 Encounters:   04/13/18 (!) 142/94   03/16/18 (!) 143/97   12/15/17 129/89                Failed - Normal serum creatinine on file in past 12 months    Recent Labs   Lab Test  09/20/17   0630   CR  0.66            Failed - Normal serum potassium on file in past 12 months    Recent Labs   Lab Test  09/20/17   0630   POTASSIUM  3.8            Passed - Recent (12 mo) or future (30 days) visit within the authorizing provider's specialty    Patient had office visit in the last 12 months or has a visit in the next 30 days with authorizing provider or within the authorizing provider's specialty.  See \"Patient Info\" tab in inbasket, or \"Choose Columns\" in Meds & Orders section of the refill encounter.           Passed - Patient is age 18 or older       Passed - No active pregnancy on record       Passed - No positive pregnancy test in past 12 months        simvastatin (ZOCOR) 40 MG tablet [Pharmacy Med Name: SIMVASTATIN  TAB 40MG] 90 tablet 0    Last Written Prescription Date:  7/17/2018  Last Fill Quantity: 90,  # refills: 0   Last office visit: 4/13/2018 with prescribing provider:  4/13/2018   Future Office Visit:     Sig: TAKE 1 TABLET AT BEDTIME    Statins Protocol Failed    9/28/2018  5:06 AM       Failed - LDL on file in past 12 months    Recent Labs   Lab Test  08/15/17   1001   LDL  76            Passed - No abnormal creatine kinase in past 12 months    Recent Labs   Lab Test  " "11/08/16   1009   CKT  136               Passed - Recent (12 mo) or future (30 days) visit within the authorizing provider's specialty    Patient had office visit in the last 12 months or has a visit in the next 30 days with authorizing provider or within the authorizing provider's specialty.  See \"Patient Info\" tab in inbasket, or \"Choose Columns\" in Meds & Orders section of the refill encounter.           Passed - Patient is age 18 or older       Passed - No active pregnancy on record       Passed - No positive pregnancy test in past 12 months        DULoxetine (CYMBALTA) 30 MG EC capsule [Pharmacy Med Name: DULOXETINE CAP 30MG DR] 180 capsule 1    Last Written Prescription Date:  10/23/2017  Last Fill Quantity: 180,  # refills: 1   Last office visit: 4/13/2018 with prescribing provider:  4/13/2018   Future Office Visit:     Sig: TAKE 1 CAPSULE TWICE DAILY    Serotonin-Norepinephrine Reuptake Inhibitors  Failed    9/28/2018  5:06 AM       Failed - Blood pressure under 140/90 in past 12 months    BP Readings from Last 3 Encounters:   04/13/18 (!) 142/94   03/16/18 (!) 143/97   12/15/17 129/89                Passed - PHQ-9 score of less than 5 in past 6 months    Please review last PHQ-9 score.   PHQ-9 SCORE 10/4/2016 9/12/2017 4/13/2018   Total Score - - -   Total Score 1 0 0     No flowsheet data found.               Passed - Patient is age 18 or older       Passed - No active pregnancy on record       Passed - No positive pregnancy test in past 12 months       Passed - Recent (6 mo) or future (30 days) visit within the authorizing provider's specialty    Patient had office visit in the last 6 months or has a visit in the next 30 days with authorizing provider or within the authorizing provider's specialty.  See \"Patient Info\" tab in inbasket, or \"Choose Columns\" in Meds & Orders section of the refill encounter.              "

## 2018-09-28 NOTE — TELEPHONE ENCOUNTER
Routing refill request to provider for review/approval because:  Labs not current:  CR, LDL  BP not at goal.     (Triage LM on VM to call back and schedule for fasting lab and follow up;per 4/13/2018 last office visit note)    Ynes JOHNSON RN, BSN, PHN

## 2018-10-05 DIAGNOSIS — E78.5 HYPERLIPIDEMIA LDL GOAL <100: ICD-10-CM

## 2018-10-05 DIAGNOSIS — E11.9 TYPE 2 DIABETES MELLITUS WITHOUT COMPLICATION, WITHOUT LONG-TERM CURRENT USE OF INSULIN (H): ICD-10-CM

## 2018-10-05 LAB
ALBUMIN SERPL-MCNC: 3.6 G/DL (ref 3.4–5)
ALP SERPL-CCNC: 50 U/L (ref 40–150)
ALT SERPL W P-5'-P-CCNC: 38 U/L (ref 0–50)
ANION GAP SERPL CALCULATED.3IONS-SCNC: 8 MMOL/L (ref 3–14)
AST SERPL W P-5'-P-CCNC: 16 U/L (ref 0–45)
BILIRUB SERPL-MCNC: 0.4 MG/DL (ref 0.2–1.3)
BUN SERPL-MCNC: 10 MG/DL (ref 7–30)
CALCIUM SERPL-MCNC: 8.8 MG/DL (ref 8.5–10.1)
CHLORIDE SERPL-SCNC: 105 MMOL/L (ref 94–109)
CHOLEST SERPL-MCNC: 161 MG/DL
CO2 SERPL-SCNC: 27 MMOL/L (ref 20–32)
CREAT SERPL-MCNC: 0.69 MG/DL (ref 0.52–1.04)
CREAT UR-MCNC: 211 MG/DL
GFR SERPL CREATININE-BSD FRML MDRD: >90 ML/MIN/1.7M2
GLUCOSE SERPL-MCNC: 97 MG/DL (ref 70–99)
HBA1C MFR BLD: 6.5 % (ref 0–5.6)
HDLC SERPL-MCNC: 41 MG/DL
LDLC SERPL CALC-MCNC: 96 MG/DL
MICROALBUMIN UR-MCNC: 14 MG/L
MICROALBUMIN/CREAT UR: 6.82 MG/G CR (ref 0–25)
NONHDLC SERPL-MCNC: 120 MG/DL
POTASSIUM SERPL-SCNC: 4.2 MMOL/L (ref 3.4–5.3)
PROT SERPL-MCNC: 7.6 G/DL (ref 6.8–8.8)
SODIUM SERPL-SCNC: 140 MMOL/L (ref 133–144)
TRIGL SERPL-MCNC: 121 MG/DL

## 2018-10-05 PROCEDURE — 82043 UR ALBUMIN QUANTITATIVE: CPT | Performed by: INTERNAL MEDICINE

## 2018-10-05 PROCEDURE — 36415 COLL VENOUS BLD VENIPUNCTURE: CPT | Performed by: INTERNAL MEDICINE

## 2018-10-05 PROCEDURE — 80061 LIPID PANEL: CPT | Performed by: INTERNAL MEDICINE

## 2018-10-05 PROCEDURE — 83036 HEMOGLOBIN GLYCOSYLATED A1C: CPT | Performed by: INTERNAL MEDICINE

## 2018-10-05 PROCEDURE — 80053 COMPREHEN METABOLIC PANEL: CPT | Performed by: INTERNAL MEDICINE

## 2018-10-30 DIAGNOSIS — E11.9 TYPE 2 DIABETES MELLITUS WITHOUT COMPLICATION, WITHOUT LONG-TERM CURRENT USE OF INSULIN (H): Primary | ICD-10-CM

## 2018-10-30 NOTE — TELEPHONE ENCOUNTER
Requested Prescriptions   Pending Prescriptions Disp Refills     metFORMIN (GLUCOPHAGE) 1000 MG tablet [Pharmacy Med Name: METFORMIN TAB 1000MG]  Last Written Prescription Date:  04/13/2018  Last Fill Quantity: 180,  # refills: 3   Last office visit: 4/13/2018 with prescribing provider:  CAROLINE   Future Office Visit:   Next 5 appointments (look out 90 days)     Nov 05, 2018  8:00 AM CST   [facility]    [address] [address] [phone]                  180 tablet 3     Sig: TAKE 1 TABLET TWICE A DAY  WITH BREAKFAST AND DINNER    Biguanide Agents Failed    10/30/2018  5:14 AM       Failed - Blood pressure less than 140/90 in past 6 months    BP Readings from Last 3 Encounters:   04/13/18 (!) 142/94   03/16/18 (!) 143/97   12/15/17 129/89                Passed - Patient has documented LDL within the past 12 mos.    Recent Labs   Lab Test  10/05/18   0813   LDL  96            Passed - Patient has had a Microalbumin in the past 15 mos.    Recent Labs   Lab Test  10/05/18   0816   MICROL  14   UMALCR  6.82            Passed - Patient is age 10 or older       Passed - Patient has documented A1c within the specified period of time.    If HgbA1C is 8 or greater, it needs to be on file within the past 3 months.  If less than 8, must be on file within the past 6 months.     Recent Labs   Lab Test  10/05/18   0813   A1C  6.5*            Passed - Patient's CR is NOT>1.4 OR Patient's EGFR is NOT<45 within past 12 mos.    Recent Labs   Lab Test  10/05/18   0813   GFRESTIMATED  >90   GFRESTBLACK  >90       Recent Labs   Lab Test  10/05/18   0813   CR  0.69            Passed - Patient does NOT have a diagnosis of CHF.       Passed - Patient is not pregnant       Passed - Patient has not had a positive pregnancy test within the past 12 mos.        Passed - Recent (6 mo) or future (30 days) visit within the  "authorizing provider's specialty    Patient had office visit in the last 6 months or has a visit in the next 30 days with authorizing provider or within the authorizing provider's specialty.  See \"Patient Info\" tab in inbasket, or \"Choose Columns\" in Meds & Orders section of the refill encounter.              "

## 2018-10-30 NOTE — TELEPHONE ENCOUNTER
Routing refill request to provider for review/approval because:  out of range:  blood pressure  Pt does have upcoming appt

## 2018-11-05 ENCOUNTER — OFFICE VISIT (OUTPATIENT)
Dept: INTERNAL MEDICINE | Facility: CLINIC | Age: 48
End: 2018-11-05
Payer: COMMERCIAL

## 2018-11-05 VITALS
TEMPERATURE: 98.5 F | HEART RATE: 69 BPM | DIASTOLIC BLOOD PRESSURE: 74 MMHG | OXYGEN SATURATION: 100 % | BODY MASS INDEX: 32.12 KG/M2 | RESPIRATION RATE: 12 BRPM | SYSTOLIC BLOOD PRESSURE: 114 MMHG | WEIGHT: 199 LBS

## 2018-11-05 DIAGNOSIS — E03.9 HYPOTHYROIDISM, UNSPECIFIED TYPE: ICD-10-CM

## 2018-11-05 DIAGNOSIS — Z23 NEED FOR PROPHYLACTIC VACCINATION AND INOCULATION AGAINST INFLUENZA: ICD-10-CM

## 2018-11-05 DIAGNOSIS — I10 ESSENTIAL HYPERTENSION WITH GOAL BLOOD PRESSURE LESS THAN 130/80: ICD-10-CM

## 2018-11-05 DIAGNOSIS — F32.0 MILD MAJOR DEPRESSION (H): ICD-10-CM

## 2018-11-05 DIAGNOSIS — E78.5 HYPERLIPIDEMIA LDL GOAL <100: ICD-10-CM

## 2018-11-05 DIAGNOSIS — E11.9 TYPE 2 DIABETES MELLITUS WITHOUT COMPLICATION, WITHOUT LONG-TERM CURRENT USE OF INSULIN (H): ICD-10-CM

## 2018-11-05 DIAGNOSIS — G62.9 NEUROPATHY: ICD-10-CM

## 2018-11-05 PROCEDURE — 90686 IIV4 VACC NO PRSV 0.5 ML IM: CPT | Performed by: INTERNAL MEDICINE

## 2018-11-05 PROCEDURE — 99207 C FOOT EXAM  NO CHARGE: CPT | Mod: 25 | Performed by: INTERNAL MEDICINE

## 2018-11-05 PROCEDURE — 90471 IMMUNIZATION ADMIN: CPT | Performed by: INTERNAL MEDICINE

## 2018-11-05 PROCEDURE — 99214 OFFICE O/P EST MOD 30 MIN: CPT | Mod: 25 | Performed by: INTERNAL MEDICINE

## 2018-11-05 RX ORDER — DULOXETIN HYDROCHLORIDE 30 MG/1
CAPSULE, DELAYED RELEASE ORAL
Qty: 180 CAPSULE | Refills: 3 | Status: SHIPPED | OUTPATIENT
Start: 2018-11-05 | End: 2019-10-03

## 2018-11-05 RX ORDER — SIMVASTATIN 40 MG
40 TABLET ORAL AT BEDTIME
Qty: 90 TABLET | Refills: 3 | Status: SHIPPED | OUTPATIENT
Start: 2018-11-05 | End: 2018-12-20

## 2018-11-05 RX ORDER — LISINOPRIL 40 MG/1
TABLET ORAL
Qty: 90 TABLET | Refills: 3 | Status: SHIPPED | OUTPATIENT
Start: 2018-11-05 | End: 2018-12-20

## 2018-11-05 RX ORDER — LEVOTHYROXINE SODIUM 100 UG/1
100 TABLET ORAL DAILY
Qty: 90 TABLET | Refills: 3 | Status: SHIPPED | OUTPATIENT
Start: 2018-11-05 | End: 2019-09-10

## 2018-11-05 ASSESSMENT — PATIENT HEALTH QUESTIONNAIRE - PHQ9: SUM OF ALL RESPONSES TO PHQ QUESTIONS 1-9: 0

## 2018-11-05 NOTE — PROGRESS NOTES

## 2018-11-05 NOTE — MR AVS SNAPSHOT
After Visit Summary   11/5/2018    Natali Nicholson    MRN: [id number]           Patient Information     Date Of Birth          1970        Visit Information        Provider Department      11/5/2018 8:00 AM Pablo Gomez MD [facility]        Today's Diagnoses     Neuropathy    -  1    Screening for diabetic peripheral neuropathy        Need for prophylactic vaccination and inoculation against influenza        Recurrent major depressive disorder, in full remission (H)        Hypothyroidism, unspecified type        Essential hypertension with goal blood pressure less than 130/80        Type 2 diabetes mellitus without complication, without long-term current use of insulin (H)        Hyperlipidemia LDL goal <100          Care Instructions    Continue current meds  Prescriptions refilled.    Call  [phone] or use [employer] to schedule a future lab appointment  non-fasting in 6 months.   Mammogram  Flu vaccine  EMG both upper extremities. [facility] will call to scheduled. If not heard from them by tomorrow afternoon, then call [phone]          Follow-ups after your visit        Additional Services     NEUROLOGY ADULT REFERRAL       Your provider has referred you for the following:   EMG at [facility]: [facility] Jabier Chow [phone]   [email or web address]    PT needs bilateral upper extremity EMG for numbness/pain. Please call pt to schedule. [phone]    Please be aware that coverage of these services is subject to the terms and limitations of your health insurance plan.  Call member services at your health plan with any benefit or coverage questions.      Please bring the following with you to your appointment:    (1) Any X-Rays, CTs or MRIs which have been performed.  Contact the facility where they were done to arrange for  prior to your scheduled appointment.    (2) List of current medications  (3)  This referral request   (4) Any documents/labs given to you for this referral                  Future tests that were ordered for you today     Open Future Orders        Priority Expected Expires Ordered    Basic metabolic panel Routine 5/4/2019 9/1/2019 11/5/2018    Hemoglobin A1c Routine 5/4/2019 9/1/2019 11/5/2018    TSH with free T4 reflex Routine 5/4/2019 9/1/2019 11/5/2018            Who to contact     If you have questions or need follow up information about today's clinic visit or your schedule please contact [facility] directly at [phone].  Normal or non-critical lab and imaging results will be communicated to you by [employer], letter or phone within 4 business days after the clinic has received the results. If you do not hear from us within 7 days, please contact the clinic through [employer] or phone. If you have a critical or abnormal lab result, we will notify you by phone as soon as possible.  Submit refill requests through [employer] or call your pharmacy and they will forward the refill request to us. Please allow 3 business days for your refill to be completed.          Additional Information About Your Visit        [employer] Information     [employer] gives you secure access to your electronic health record. If you see a primary care provider, you can also send messages to your care team and make appointments. If you have questions, please call your primary care clinic.  If you do not have a primary care provider, please call [phone] and they will assist you.        Care EveryWhere ID     This is your Care EveryWhere ID. This could be used by other organizations to access your [facility] medical records  [id number]        Your Vitals Were     Pulse Temperature Respirations Pulse Oximetry BMI (Body Mass Index)       69 98.5  F (36.9  C) (Oral) 12 100% 32.12 kg/m2        Blood Pressure from Last 3 Encounters:   11/05/18 114/74   04/13/18 (!) 142/94   03/16/18 (!) 143/97     Weight from Last 3 Encounters:   11/05/18 199 lb (90.3 kg)   04/13/18 206 lb (93.4 kg)   03/16/18 206 lb (93.4 kg)              We Performed the Following     FOOT EXAM  NO CHARGE [38984.114]     NEUROLOGY ADULT REFERRAL          Today's Medication Changes          These changes are accurate as of 11/5/18  8:36 AM.  If you have any questions, ask your nurse or doctor.               These medicines have changed or have updated prescriptions.        Dose/Directions    simvastatin 40 MG tablet   Commonly known as:  ZOCOR   This may have changed:  See the new instructions.   Used for:  Hyperlipidemia LDL goal <100   Changed by:  Pablo Gomez MD        Dose:  40 mg   Take 1 tablet (40 mg) by mouth At Bedtime   Quantity:  90 tablet   Refills:  3            Where to get your medicines      These medications were sent to [facility] - [place], [place] - [address] AT [address] to [address], [facility] [address]     Phone:  [phone]     DULoxetine 30 MG EC capsule    levothyroxine 100 MCG tablet    lisinopril 40 MG tablet    metFORMIN 1000 MG tablet    simvastatin 40 MG tablet                Primary Care Provider Office Phone # Fax #    Pablo Gomez [phone]       [address] [place] [address]        Equal Access to Services     HERIBERTO MELÉNDEZ AH: Hadii torres ku hadasho Soomaali, waaxda luqadaha, qaybta kaalmada adeegyada, waxay williamin hayaan yani simmons . So [facility] [phone].    ATENCIÓN: Si habla español, tiene a crawley disposición servicios gratuitos de asistencia lingüística. Llame al [phone].    We comply with applicable federal civil rights laws and [place] laws. We do not discriminate on the basis of race, color, national origin, age, disability, sex, sexual orientation, or gender identity.            Thank you!     Thank you for choosing [facility]  for your care. Our goal is always to provide you  with excellent care. Hearing back from our patients is one way we can continue to improve our services. Please take a few minutes to complete the written survey that you may receive in the mail after your visit with us. Thank you!             Your Updated Medication List - Protect others around you: Learn how to safely use, store and throw away your medicines at www.disposemymeds.org.          This list is accurate as of 11/5/18  8:36 AM.  Always use your most recent med list.                   Brand Name Dispense Instructions for use Diagnosis    blood glucose monitoring lancets     1 Box    Use to test blood sugars 1 times daily or as directed. Reference #[id number]    Type 2 diabetes mellitus without complication, without long-term current use of insulin (H)       blood glucose monitoring meter device kit     1 kit    Use to test blood sugar 1 times daily or as directed.  Reference #[id number]    Type 2 diabetes mellitus without complication, without long-term current use of insulin (H)       blood glucose monitoring test strip    ONETOUCH VERIO IQ    100 each    Use to test blood sugars 1 times daily or as directed.  Reference #[id number]    Type 2 diabetes mellitus without complication, without long-term current use of insulin (H)       DULoxetine 30 MG EC capsule    CYMBALTA    180 capsule    TAKE 1 CAPSULE TWICE DAILY    Recurrent major depressive disorder, in full remission (H)       levothyroxine 100 MCG tablet    SYNTHROID/LEVOTHROID    90 tablet    Take 1 tablet (100 mcg) by mouth daily    Hypothyroidism, unspecified type       lisinopril 40 MG tablet    PRINIVIL/ZESTRIL    90 tablet    TAKE 1 TABLET DAILY (DOSE  CHANGE)    Essential hypertension with goal blood pressure less than 130/80       metFORMIN 1000 MG tablet    GLUCOPHAGE    180 tablet    TAKE 1 TABLET TWICE A DAY  WITH BREAKFAST AND DINNER    Type 2 diabetes mellitus without complication, without long-term current use of insulin (H)        simvastatin 40 MG tablet    ZOCOR    90 tablet    Take 1 tablet (40 mg) by mouth At Bedtime    Hyperlipidemia LDL goal <100

## 2018-11-05 NOTE — PROGRESS NOTES
SUBJECTIVE:   Natali Nicholson is a 48 year old female who presents to clinic today for the following health issues:      Diabetes Follow-up      Patient is checking blood sugars: 1 times a week    Diabetic concerns: None     Symptoms of hypoglycemia (low blood sugar): none     Paresthesias (numbness or burning in feet) or sores: No     Date of last diabetic eye exam: 2 years    BP Readings from Last 2 Encounters:   04/13/18 (!) 142/94   03/16/18 (!) 143/97     Hemoglobin A1C (%)   Date Value   10/05/2018 6.5 (H)   04/10/2018 6.8 (H)     LDL Cholesterol Calculated (mg/dL)   Date Value   10/05/2018 96   08/15/2017 76       Diabetes Management Resources  Hypertension Follow-up      Outpatient blood pressures sometimes    Low Salt Diet: not monitoring salt      Amount of exercise or physical activity: 4-5 days/week for an average of 30-45 minutes    Problems taking medications regularly: No    Medication side effects: none    Diet: low salt and diabetic      Pt's past medical history, family history, habits, medications and allergies were reviewed with the patient today.  See snap shot for  HCM status. Most recent lab results reviewed with pt. Problem list and histories reviewed & adjusted, as indicated.  Additional history as below:    Component      Latest Ref Rng & Units 8/15/2017 4/10/2018 10/5/2018   Sodium      133 - 144 mmol/L 136  140   Potassium      3.4 - 5.3 mmol/L 3.9  4.2   Chloride      94 - 109 mmol/L 103  105   Carbon Dioxide      20 - 32 mmol/L 28  27   Anion Gap      3 - 14 mmol/L 5  8   Glucose      70 - 99 mg/dL 134 (H)  97   Urea Nitrogen      7 - 30 mg/dL 9  10   Creatinine      0.52 - 1.04 mg/dL 0.81  0.69   GFR Estimate      >60 mL/min/1.7m2 76  >90   GFR Estimate If Black      >60 mL/min/1.7m2 >90  >90   Calcium      8.5 - 10.1 mg/dL 8.8  8.8   Bilirubin Total      0.2 - 1.3 mg/dL 0.5  0.4   Albumin      3.4 - 5.0 g/dL 3.7  3.6   Protein Total      6.8 - 8.8 g/dL 7.9  7.6   Alkaline  "Phosphatase      40 - 150 U/L 56  50   ALT      0 - 50 U/L 52 (H)  38   AST      0 - 45 U/L 28  16   Cholesterol      <200 mg/dL 155  161   Triglycerides      <150 mg/dL 188 (H)  121   HDL Cholesterol      >49 mg/dL 41 (L)  41 (L)   LDL Cholesterol Calculated      <100 mg/dL 76  96   Non HDL Cholesterol      <130 mg/dL 114  120   Creatinine Urine      mg/dL 182  211   Albumin Urine mg/L      mg/L 11  14   Albumin Urine mg/g Cr      0 - 25 mg/g Cr 6.26  6.82   Hemoglobin A1C      0 - 5.6 % 7.2 (H) 6.8 (H) 6.5 (H)   TSH      0.40 - 4.00 mU/L 5.26 (H) 2.63    T4 Free      0.76 - 1.46 ng/dL 1.00          Denies CP, SOB, abdominal pain, polyuria, polydipsia, vision changes or skin problems.  Had previous cervical spine surgery 1 year ago. EMG then showed both C5 radiculopathy and some moderate right median nerve wrist level nerve issues. Has  some numbness and pain  bilateral hands/fingers. More proximal RUE sx resolved with neck surgery    Additional ROS:   Constitutional, HEENT, Cardiovascular, Pulmonary, GI and , Neuro, MSK and Psych review of systems/symptoms are otherwise negative or unchanged from previous, except as noted above.      OBJECTIVE:  /74 (BP Location: Left arm, Patient Position: Chair, Cuff Size: Adult Large)  Pulse 69  Temp 98.5  F (36.9  C) (Oral)  Resp 12  Wt 199 lb (90.3 kg)  SpO2 100%  BMI 32.12 kg/m2   Estimated body mass index is 32.12 kg/(m^2) as calculated from the following:    Height as of 3/16/18: 5' 6\" (1.676 m).    Weight as of this encounter: 199 lb (90.3 kg).    Neck: no adenopathy. Thyroid normal to palpation. No bruits  Pulm: Lungs clear to auscultation   CV: Regular rates and rhythm  GI: Soft, nontender, Normal active bowel sounds, No hepatosplenomegaly or masses palpable  Ext: Peripheral pulses intact. No edema. Normal DM foot exam except for mild left bunion formation 1st MTP (nontender).   Neuro: Normal strength and tone, sensory exam mildly reduced to LTS  median " nerve distribution BUE  (R>L)    Assessment/Plan: (See plan discussion below for further details)    1. Type 2 diabetes mellitus without complication, without long-term current use of insulin (H)  Controlled.  Continue current medication.  Future labs ordered  - FOOT EXAM  NO CHARGE [68542.114]  - metFORMIN (GLUCOPHAGE) 1000 MG tablet; TAKE 1 TABLET TWICE A DAY  WITH BREAKFAST AND DINNER  Dispense: 180 tablet; Refill: 3  - Hemoglobin A1c; Future    2. Mild major depression (H)   Controlled.  PHQ = 0.  Continue current medication  - DULoxetine (CYMBALTA) 30 MG EC capsule; TAKE 1 CAPSULE TWICE DAILY  Dispense: 180 capsule; Refill: 3    3. Hypothyroidism, unspecified type  Controlled.  Continue current medication.  Future labs ordered  - levothyroxine (SYNTHROID/LEVOTHROID) 100 MCG tablet; Take 1 tablet (100 mcg) by mouth daily  Dispense: 90 tablet; Refill: 3  - TSH with free T4 reflex; Future    4. Essential hypertension with goal blood pressure less than 130/80  Controlled.  Continue current medication.  Future labs ordered  - lisinopril (PRINIVIL/ZESTRIL) 40 MG tablet; TAKE 1 TABLET DAILY (DOSE  CHANGE)  Dispense: 90 tablet; Refill: 3  - Basic metabolic panel; Future    5. Hyperlipidemia LDL goal <100  Controlled.  Continue current medication.  Future labs ordered  - simvastatin (ZOCOR) 40 MG tablet; Take 1 tablet (40 mg) by mouth At Bedtime  Dispense: 90 tablet; Refill: 3  - Lipid panel reflex to direct LDL Fasting; Future  - Comprehensive metabolic panel; Future    6. Neuropathy   Needs EMG BUE to assess if true CTS present vs residual nerve damage from previous cervical nerve compression  - NEUROLOGY ADULT REFERRAL    7. Need for prophylactic vaccination and inoculation against influenza  - FLU VACCINE, SPLIT VIRUS, IM (QUADRIVALENT) [20902]- >3 YRS  - Vaccine Administration, Initial [25448]       Plan discussion:  Continue current meds  Prescriptions refilled.    Call  [phone] or use [employer] to schedule a  future lab appointment  non-fasting in 6 months.   Mammogram  Flu vaccine  EMG both upper extremities. [facility] will call to schedule. If not heard from them by tomorrow afternoon, then call [phone]         Pablo Gomez MD  Internal Medicine Department  [facility]

## 2018-11-05 NOTE — PATIENT INSTRUCTIONS
Continue current meds  Prescriptions refilled.    Call  [phone] or use [employer] to schedule a future lab appointment  non-fasting in 6 months.   Mammogram  Flu vaccine  EMG both upper extremities. [facility] will call to schedule. If not heard from them by tomorrow afternoon, then call [phone]

## 2018-11-06 ENCOUNTER — TRANSFERRED RECORDS (OUTPATIENT)
Dept: HEALTH INFORMATION MANAGEMENT | Facility: CLINIC | Age: 48
End: 2018-11-06

## 2018-12-16 DIAGNOSIS — I10 ESSENTIAL HYPERTENSION WITH GOAL BLOOD PRESSURE LESS THAN 130/80: ICD-10-CM

## 2018-12-16 DIAGNOSIS — E78.5 HYPERLIPIDEMIA LDL GOAL <100: ICD-10-CM

## 2018-12-16 NOTE — TELEPHONE ENCOUNTER
"Requested Prescriptions   Pending Prescriptions Disp Refills     lisinopril (PRINIVIL/ZESTRIL) 40 MG tablet [Pharmacy Med Name: LISINOPRIL TAB 40MG] 90 tablet 0    Last Written Prescription Date:  11/5/2018  Last Fill Quantity: 90,  # refills: 3   Last office visit: 11/5/2018 with prescribing provider:  11/5/2018   Future Office Visit:     Sig: TAKE 1 TABLET DAILY (DOSE  CHANGE)    ACE Inhibitors (Including Combos) Protocol Passed - 12/16/2018  6:21 AM       Passed - Blood pressure under 140/90 in past 12 months    BP Readings from Last 3 Encounters:   11/05/18 114/74   04/13/18 (!) 142/94   03/16/18 (!) 143/97                Passed - Recent (12 mo) or future (30 days) visit within the authorizing provider's specialty    Patient had office visit in the last 12 months or has a visit in the next 30 days with authorizing provider or within the authorizing provider's specialty.  See \"Patient Info\" tab in inbasket, or \"Choose Columns\" in Meds & Orders section of the refill encounter.             Passed - Patient is age 18 or older       Passed - No active pregnancy on record       Passed - Normal serum creatinine on file in past 12 months    Recent Labs   Lab Test 10/05/18  0813   CR 0.69            Passed - Normal serum potassium on file in past 12 months    Recent Labs   Lab Test 10/05/18  0813   POTASSIUM 4.2            Passed - No positive pregnancy test in past 12 months        simvastatin (ZOCOR) 40 MG tablet [Pharmacy Med Name: SIMVASTATIN  TAB 40MG] 90 tablet 0    Last Written Prescription Date:  11/5/2018  Last Fill Quantity: 90,  # refills: 3   Last office visit: 11/5/2018 with prescribing provider:  11/5/2018   Future Office Visit:     Sig: TAKE 1 TABLET AT BEDTIME    Statins Protocol Passed - 12/16/2018  6:21 AM       Passed - LDL on file in past 12 months    Recent Labs   Lab Test 10/05/18  0813   LDL 96            Passed - No abnormal creatine kinase in past 12 months    Recent Labs   Lab Test " "11/08/16  1009   T 136               Passed - Recent (12 mo) or future (30 days) visit within the authorizing provider's specialty    Patient had office visit in the last 12 months or has a visit in the next 30 days with authorizing provider or within the authorizing provider's specialty.  See \"Patient Info\" tab in inbasket, or \"Choose Columns\" in Meds & Orders section of the refill encounter.             Passed - Patient is age 18 or older       Passed - No active pregnancy on record       Passed - No positive pregnancy test in past 12 months          "

## 2018-12-18 RX ORDER — SIMVASTATIN 40 MG
TABLET ORAL
Qty: 90 TABLET | Refills: 2 | Status: SHIPPED | OUTPATIENT
Start: 2018-12-18 | End: 2018-12-20

## 2018-12-18 RX ORDER — LISINOPRIL 40 MG/1
TABLET ORAL
Qty: 90 TABLET | Refills: 2 | Status: SHIPPED | OUTPATIENT
Start: 2018-12-18 | End: 2019-10-03

## 2018-12-20 ENCOUNTER — TELEPHONE (OUTPATIENT)
Dept: INTERNAL MEDICINE | Facility: CLINIC | Age: 48
End: 2018-12-20

## 2018-12-20 DIAGNOSIS — E78.5 HYPERLIPIDEMIA LDL GOAL <100: ICD-10-CM

## 2018-12-20 RX ORDER — ATORVASTATIN CALCIUM 20 MG/1
20 TABLET, FILM COATED ORAL DAILY
Qty: 90 TABLET | Refills: 3 | Status: SHIPPED | OUTPATIENT
Start: 2018-12-20 | End: 2019-10-03

## 2018-12-20 NOTE — TELEPHONE ENCOUNTER
Tried to get simvastatin 40 mg tablets also from the [facility] pharmacy but they also do not have them.  Therefore we will have to change patient to  atorvastatin 20 mg tablet 1 tablet daily which should be close to equivalent strength of the higher dose simvastatin.  Prescription sent to mail order pharmacy for the atorvastatin.  Patient to start it when the prescription arrives and stop simvastatin.  Patient to then have repeat fasting labs done in a month to recheck her lipid levels with a new medication.  Labs ordered.  Assist patient in scheduling future lab appointment.

## 2018-12-20 NOTE — TELEPHONE ENCOUNTER
simvastatin (ZOCOR) 40 MG tablet is mfr backordered and 2 per dose of a lower strength is not available.  Please provide an alternate 1 QD dosed generic.

## 2019-01-05 DIAGNOSIS — F33.42 RECURRENT MAJOR DEPRESSIVE DISORDER, IN FULL REMISSION (H): ICD-10-CM

## 2019-01-05 NOTE — TELEPHONE ENCOUNTER
"Requested Prescriptions   Pending Prescriptions Disp Refills     DULoxetine (CYMBALTA) 30 MG capsule [Pharmacy Med Name: DULOXETINE CAP 30MG DR] 180 capsule 0    Last Written Prescription Date:  11/5/2018  Last Fill Quantity: 180,  # refills: 3   Last office visit: 11/5/2018 with prescribing provider:  11/5/2018   Future Office Visit:     Sig: TAKE 1 CAPSULE TWICE DAILY    Serotonin-Norepinephrine Reuptake Inhibitors  Passed - 1/5/2019  1:51 AM       Passed - Blood pressure under 140/90 in past 12 months    BP Readings from Last 3 Encounters:   11/05/18 114/74   04/13/18 (!) 142/94   03/16/18 (!) 143/97                Passed - PHQ-9 score of less than 5 in past 6 months    Please review last PHQ-9 score.   PHQ-9 SCORE 9/12/2017 4/13/2018 11/5/2018   PHQ-9 Total Score - - -   PHQ-9 Total Score 0 0 0     No flowsheet data found.               Passed - Medication is active on med list       Passed - Patient is age 18 or older       Passed - No active pregnancy on record       Passed - No positive pregnancy test in past 12 months       Passed - Recent (6 mo) or future (30 days) visit within the authorizing provider's specialty    Patient had office visit in the last 6 months or has a visit in the next 30 days with authorizing provider or within the authorizing provider's specialty.  See \"Patient Info\" tab in inbasket, or \"Choose Columns\" in Meds & Orders section of the refill encounter.              "

## 2019-01-07 RX ORDER — DULOXETIN HYDROCHLORIDE 30 MG/1
CAPSULE, DELAYED RELEASE ORAL
Qty: 180 CAPSULE | Refills: 3 | Status: SHIPPED | OUTPATIENT
Start: 2019-01-07 | End: 2019-10-03

## 2019-04-02 DIAGNOSIS — E03.9 HYPOTHYROIDISM, UNSPECIFIED TYPE: ICD-10-CM

## 2019-04-02 NOTE — TELEPHONE ENCOUNTER
"Requested Prescriptions   Pending Prescriptions Disp Refills     SYNTHROID 100 MCG tablet [Pharmacy Med Name: SYNTHROID TAB 0.1MG] 90 tablet 1     Sig: TAKE 1 TABLET DAILY (NOTE  DOSE CHANGE: STOP REFILLS  OF THE 88MCG TABLET)    Thyroid Protocol Passed - 4/2/2019  6:29 AM       Passed - Patient is 12 years or older       Passed - Recent (12 mo) or future (30 days) visit within the authorizing provider's specialty    Patient had office visit in the last 12 months or has a visit in the next 30 days with authorizing provider or within the authorizing provider's specialty.  See \"Patient Info\" tab in inbasket, or \"Choose Columns\" in Meds & Orders section of the refill encounter.             Passed - Medication is active on med list       Passed - Normal TSH on file in past 12 months    Recent Labs   Lab Test 04/10/18  0835   TSH 2.63             Passed - No active pregnancy on record    If patient is pregnant or has had a positive pregnancy test, please check TSH.         Passed - No positive pregnancy test in past 12 months    If patient is pregnant or has had a positive pregnancy test, please check TSH.          Last Written Prescription Date:  11/05/2018  Last Fill Quantity: 90,  # refills: 3   Last office visit: 11/5/2018 with prescribing provider:  11/05/2018   Future Office Visit:      "

## 2019-04-03 RX ORDER — LEVOTHYROXINE SODIUM 100 MCG
TABLET ORAL
Qty: 90 TABLET | Refills: 0 | Status: SHIPPED | OUTPATIENT
Start: 2019-04-03 | End: 2019-06-21

## 2019-06-21 DIAGNOSIS — E03.9 HYPOTHYROIDISM, UNSPECIFIED TYPE: ICD-10-CM

## 2019-06-21 RX ORDER — LEVOTHYROXINE SODIUM 100 MCG
TABLET ORAL
Qty: 90 TABLET | Refills: 0 | Status: SHIPPED | OUTPATIENT
Start: 2019-06-21 | End: 2019-10-03

## 2019-06-21 NOTE — TELEPHONE ENCOUNTER
Medication is being filled for 1 time refill only due to:  Future labs ordered TSH with T4.     Prescription approved per [facility] Protocol.    Ynes JOHNSON RN, BSN, PHN

## 2019-06-21 NOTE — TELEPHONE ENCOUNTER
"Requested Prescriptions   Pending Prescriptions Disp Refills     SYNTHROID 100 MCG tablet [Pharmacy Med Name: SYNTHROID TAB 0.1MG] 90 tablet 0     Sig: TAKE 1 TABLET DAILY (NOTE  DOSE CHANGE: STOP REFILLS  OF THE 88MCG TABLET)       Thyroid Protocol Failed - 6/21/2019  1:08 PM        Failed - Normal TSH on file in past 12 months     Recent Labs   Lab Test 04/10/18  0835   TSH 2.63              Passed - Patient is 12 years or older        Passed - Recent (12 mo) or future (30 days) visit within the authorizing provider's specialty     Patient had office visit in the last 12 months or has a visit in the next 30 days with authorizing provider or within the authorizing provider's specialty.  See \"Patient Info\" tab in inbasket, or \"Choose Columns\" in Meds & Orders section of the refill encounter.              Passed - Medication is active on med list        Passed - No active pregnancy on record     If patient is pregnant or has had a positive pregnancy test, please check TSH.          Passed - No positive pregnancy test in past 12 months     If patient is pregnant or has had a positive pregnancy test, please check TSH.            Last Written Prescription Date:  4/3/19  Last Fill Quantity: 90,  # refills: 0   Last office visit: 11/5/2018 with prescribing provider:  Patricia   Future Office Visit:      "

## 2019-06-21 NOTE — LETTER
[facility]  [address] [id number] [phone]            Natail Nicholson  [address]  [place] [address]        June 21, 2019    Dear Natali,    While refilling your prescription today, we noticed that you are due to have labs drawn and see your provider.  We will refill your prescription for 30 days, but a follow-up appointment must be made before any additional refills can be approved.     Taking care of your health is important to us and we look forward to seeing you in the near future.  Please call us at [phone] or [phone] (or use [employer]) to schedule an appointment.     Please disregard this notice if you have already made an appointment.    Sincerely,        [facility]

## 2019-07-26 DIAGNOSIS — E11.9 TYPE 2 DIABETES MELLITUS WITHOUT COMPLICATION, WITHOUT LONG-TERM CURRENT USE OF INSULIN (H): ICD-10-CM

## 2019-07-26 DIAGNOSIS — I10 ESSENTIAL HYPERTENSION WITH GOAL BLOOD PRESSURE LESS THAN 130/80: ICD-10-CM

## 2019-07-26 DIAGNOSIS — E03.9 HYPOTHYROIDISM, UNSPECIFIED TYPE: ICD-10-CM

## 2019-07-26 DIAGNOSIS — E78.5 HYPERLIPIDEMIA LDL GOAL <100: ICD-10-CM

## 2019-07-26 LAB
ALBUMIN SERPL-MCNC: 3.6 G/DL (ref 3.4–5)
ALP SERPL-CCNC: 60 U/L (ref 40–150)
ALT SERPL W P-5'-P-CCNC: 85 U/L (ref 0–50)
ANION GAP SERPL CALCULATED.3IONS-SCNC: 8 MMOL/L (ref 3–14)
AST SERPL W P-5'-P-CCNC: 36 U/L (ref 0–45)
BILIRUB DIRECT SERPL-MCNC: <0.1 MG/DL (ref 0–0.2)
BILIRUB SERPL-MCNC: 0.3 MG/DL (ref 0.2–1.3)
BUN SERPL-MCNC: 12 MG/DL (ref 7–30)
CALCIUM SERPL-MCNC: 8.8 MG/DL (ref 8.5–10.1)
CHLORIDE SERPL-SCNC: 105 MMOL/L (ref 94–109)
CHOLEST SERPL-MCNC: 185 MG/DL
CK SERPL-CCNC: 155 U/L (ref 30–225)
CO2 SERPL-SCNC: 25 MMOL/L (ref 20–32)
CREAT SERPL-MCNC: 0.72 MG/DL (ref 0.52–1.04)
GFR SERPL CREATININE-BSD FRML MDRD: >90 ML/MIN/{1.73_M2}
GLUCOSE SERPL-MCNC: 140 MG/DL (ref 70–99)
HBA1C MFR BLD: 7.1 % (ref 0–5.6)
HDLC SERPL-MCNC: 39 MG/DL
LDLC SERPL CALC-MCNC: 107 MG/DL
NONHDLC SERPL-MCNC: 146 MG/DL
POTASSIUM SERPL-SCNC: 4.2 MMOL/L (ref 3.4–5.3)
PROT SERPL-MCNC: 7.9 G/DL (ref 6.8–8.8)
SODIUM SERPL-SCNC: 138 MMOL/L (ref 133–144)
TRIGL SERPL-MCNC: 197 MG/DL
TSH SERPL DL<=0.005 MIU/L-ACNC: 2.31 MU/L (ref 0.4–4)

## 2019-07-26 PROCEDURE — 83036 HEMOGLOBIN GLYCOSYLATED A1C: CPT | Performed by: INTERNAL MEDICINE

## 2019-07-26 PROCEDURE — 80061 LIPID PANEL: CPT | Performed by: INTERNAL MEDICINE

## 2019-07-26 PROCEDURE — 80048 BASIC METABOLIC PNL TOTAL CA: CPT | Performed by: INTERNAL MEDICINE

## 2019-07-26 PROCEDURE — 36415 COLL VENOUS BLD VENIPUNCTURE: CPT | Performed by: INTERNAL MEDICINE

## 2019-07-26 PROCEDURE — 82550 ASSAY OF CK (CPK): CPT | Performed by: INTERNAL MEDICINE

## 2019-07-26 PROCEDURE — 84443 ASSAY THYROID STIM HORMONE: CPT | Performed by: INTERNAL MEDICINE

## 2019-07-26 PROCEDURE — 80076 HEPATIC FUNCTION PANEL: CPT | Performed by: INTERNAL MEDICINE

## 2019-09-10 DIAGNOSIS — E03.9 HYPOTHYROIDISM, UNSPECIFIED TYPE: ICD-10-CM

## 2019-09-10 RX ORDER — LEVOTHYROXINE SODIUM 100 UG/1
100 TABLET ORAL DAILY
Qty: 90 TABLET | Refills: 0 | Status: SHIPPED | OUTPATIENT
Start: 2019-09-10 | End: 2019-10-03

## 2019-09-10 NOTE — TELEPHONE ENCOUNTER
"Requested Prescriptions   Pending Prescriptions Disp Refills     levothyroxine (SYNTHROID/LEVOTHROID) 100 MCG tablet 90 tablet 3     Sig: Take 1 tablet (100 mcg) by mouth daily       Thyroid Protocol Passed - 9/10/2019  2:38 PM        Passed - Patient is 12 years or older        Passed - Recent (12 mo) or future (30 days) visit within the authorizing provider's specialty     Patient had office visit in the last 12 months or has a visit in the next 30 days with authorizing provider or within the authorizing provider's specialty.  See \"Patient Info\" tab in inbasket, or \"Choose Columns\" in Meds & Orders section of the refill encounter.              Passed - Medication is active on med list        Passed - Normal TSH on file in past 12 months     Recent Labs   Lab Test 07/26/19  0934   TSH 2.31              Passed - No active pregnancy on record     If patient is pregnant or has had a positive pregnancy test, please check TSH.          Passed - No positive pregnancy test in past 12 months     If patient is pregnant or has had a positive pregnancy test, please check TSH.          Last Written Prescription Date:  11/5/18  Last Fill Quantity: 90,  # refills: 3   Last office visit: 11/5/2018 with prescribing provider:  11/5/18   Future Office Visit:      "

## 2019-09-16 ENCOUNTER — TELEPHONE (OUTPATIENT)
Dept: INTERNAL MEDICINE | Facility: CLINIC | Age: 49
End: 2019-09-16

## 2019-09-16 ASSESSMENT — PATIENT HEALTH QUESTIONNAIRE - PHQ9: SUM OF ALL RESPONSES TO PHQ QUESTIONS 1-9: 0

## 2019-09-16 NOTE — TELEPHONE ENCOUNTER
Panel Management Review    Patient Active Problem List   Diagnosis     HYPERLIPIDEMIA LDL GOAL <100     CTS (carpal tunnel syndrome)     Essential hypertension with goal blood pressure less than 130/80     Type 2 diabetes mellitus without complication, without long-term current use of insulin (H)     Non morbid obesity, unspecified obesity type     Papanicolaou smear of cervix with low grade squamous intraepithelial lesion (LGSIL)     Hypothyroidism, unspecified type     S/P cervical spinal fusion     Mild major depression (H)       Patient has the following on her problem list:     Depression / Dysthymia review    Measure:  Needs PHQ-9 score of 4 or less during index window.  Administer PHQ-9 and if score is 5 or more, send encounter to provider for next steps.    5 - 7 month window range: 0    PHQ-9 SCORE 9/12/2017 4/13/2018 11/5/2018   PHQ-9 Total Score - - -   PHQ-9 Total Score 0 0 0       If PHQ-9 recheck is 5 or more, route to provider for next steps.    Patient is due for:  PHQ9    Diabetes    ASA: Not Required     Last A1C  Lab Results   Component Value Date    A1C 7.1 07/26/2019    A1C 6.5 10/05/2018    A1C 6.8 04/10/2018    A1C 7.2 08/15/2017    A1C 6.3 08/09/2016     A1C tested: Passed    Last LDL:    Lab Results   Component Value Date    CHOL 185 07/26/2019     Lab Results   Component Value Date    HDL 39 07/26/2019     Lab Results   Component Value Date     07/26/2019     Lab Results   Component Value Date    TRIG 197 07/26/2019     Lab Results   Component Value Date    CHOLHDLRATIO 3.0 12/16/2014     Lab Results   Component Value Date    NHDL 146 07/26/2019       Is the patient on a Statin? YES             Is the patient on Aspirin? NO    Medications     HMG CoA Reductase Inhibitors     atorvastatin (LIPITOR) 20 MG tablet             Last three blood pressure readings:  BP Readings from Last 3 Encounters:   11/05/18 114/74   04/13/18 (!) 142/94   03/16/18 (!) 143/97       Date of last diabetes  office visit: 11/05/2018     Tobacco History:     History   Smoking Status     Never Smoker   Smokeless Tobacco     Never Used         Hypertension   Last three blood pressure readings:  BP Readings from Last 3 Encounters:   11/05/18 114/74   04/13/18 (!) 142/94   03/16/18 (!) 143/97     Blood pressure: MONITOR    HTN Guidelines:  Less than 140/90      Composite cancer screening  Chart review shows that this patient is due/due soon for the following Preventive Exam  Summary:    Patient is due/failing the following:   PHQ9 and PHYSICAL    Action needed:   Patient needs office visit for Px  .    Type of outreach:    Spoke with Patient, follow up appointment schedule for 09/27/2019 Phq9 completed, score 0    Questions for provider review:    None                                                                                                                                    Robyn Benz CMA       Chart routed to none .

## 2019-10-03 ENCOUNTER — OFFICE VISIT (OUTPATIENT)
Dept: INTERNAL MEDICINE | Facility: CLINIC | Age: 49
End: 2019-10-03
Payer: COMMERCIAL

## 2019-10-03 VITALS
WEIGHT: 210 LBS | OXYGEN SATURATION: 100 % | RESPIRATION RATE: 16 BRPM | SYSTOLIC BLOOD PRESSURE: 126 MMHG | HEART RATE: 69 BPM | DIASTOLIC BLOOD PRESSURE: 72 MMHG | BODY MASS INDEX: 33.89 KG/M2 | TEMPERATURE: 98.1 F

## 2019-10-03 DIAGNOSIS — K75.9 HEPATITIS: ICD-10-CM

## 2019-10-03 DIAGNOSIS — Z23 NEED FOR PROPHYLACTIC VACCINATION AND INOCULATION AGAINST INFLUENZA: ICD-10-CM

## 2019-10-03 DIAGNOSIS — E78.5 HYPERLIPIDEMIA LDL GOAL <100: ICD-10-CM

## 2019-10-03 DIAGNOSIS — E03.9 HYPOTHYROIDISM, UNSPECIFIED TYPE: ICD-10-CM

## 2019-10-03 DIAGNOSIS — E11.9 TYPE 2 DIABETES MELLITUS WITHOUT COMPLICATION, WITHOUT LONG-TERM CURRENT USE OF INSULIN (H): ICD-10-CM

## 2019-10-03 DIAGNOSIS — F33.42 RECURRENT MAJOR DEPRESSIVE DISORDER, IN FULL REMISSION (H): ICD-10-CM

## 2019-10-03 DIAGNOSIS — I10 ESSENTIAL HYPERTENSION WITH GOAL BLOOD PRESSURE LESS THAN 130/80: ICD-10-CM

## 2019-10-03 LAB
ALBUMIN SERPL-MCNC: 4 G/DL (ref 3.4–5)
ALP SERPL-CCNC: 66 U/L (ref 40–150)
ALT SERPL W P-5'-P-CCNC: 136 U/L (ref 0–50)
AST SERPL W P-5'-P-CCNC: 57 U/L (ref 0–45)
BILIRUB DIRECT SERPL-MCNC: <0.1 MG/DL (ref 0–0.2)
BILIRUB SERPL-MCNC: 0.5 MG/DL (ref 0.2–1.3)
PROT SERPL-MCNC: 8.1 G/DL (ref 6.8–8.8)

## 2019-10-03 PROCEDURE — 36415 COLL VENOUS BLD VENIPUNCTURE: CPT | Performed by: INTERNAL MEDICINE

## 2019-10-03 PROCEDURE — 80076 HEPATIC FUNCTION PANEL: CPT | Performed by: INTERNAL MEDICINE

## 2019-10-03 PROCEDURE — 90471 IMMUNIZATION ADMIN: CPT | Performed by: INTERNAL MEDICINE

## 2019-10-03 PROCEDURE — 90686 IIV4 VACC NO PRSV 0.5 ML IM: CPT | Performed by: INTERNAL MEDICINE

## 2019-10-03 PROCEDURE — 99214 OFFICE O/P EST MOD 30 MIN: CPT | Mod: 25 | Performed by: INTERNAL MEDICINE

## 2019-10-03 RX ORDER — ATORVASTATIN CALCIUM 20 MG/1
20 TABLET, FILM COATED ORAL DAILY
Qty: 90 TABLET | Refills: 3 | Status: SHIPPED | OUTPATIENT
Start: 2019-10-03 | End: 2019-10-05

## 2019-10-03 RX ORDER — LISINOPRIL 40 MG/1
TABLET ORAL
Qty: 90 TABLET | Refills: 2 | Status: SHIPPED | OUTPATIENT
Start: 2019-10-03 | End: 2020-01-03

## 2019-10-03 RX ORDER — LEVOTHYROXINE SODIUM 100 UG/1
100 TABLET ORAL DAILY
Qty: 90 TABLET | Refills: 3 | Status: SHIPPED | OUTPATIENT
Start: 2019-10-03 | End: 2019-12-18

## 2019-10-03 RX ORDER — DULOXETIN HYDROCHLORIDE 30 MG/1
CAPSULE, DELAYED RELEASE ORAL
Qty: 180 CAPSULE | Refills: 3 | Status: SHIPPED | OUTPATIENT
Start: 2019-10-03 | End: 2020-03-10

## 2019-10-03 NOTE — PATIENT INSTRUCTIONS
Continue current meds  Prescriptions refilled.    Labs today as ordered for liver  Call  [phone] or use SendtoNews to schedule a future lab appointment  fasting in mid January 2020  Follow up with me a few days after these future labs are drawn to review results and other medical issues as necessary. Check blood sugars twice a day (before breakfast and bedtime) for 4 days prior to the appointment with me and bring the results to the appointment.  If sugars remain elevated, will add Invokana. If cholesterol numbers remain elevated, will increase dose of Atorvastatin  Reduce calorie/carbohydrate (sugar, bread, potato, pasta, rice, alcohol etc)  intake in diet.  Increase color on your plate with fruits and vegetables. Increase  frequency of walking or other aerobic exercise as able (goal is daily)  Mammogram. May be scheduled to be done at the [facility]   Vaccinations: Influenza vaccine

## 2019-10-03 NOTE — PROGRESS NOTES
Subjective     Natali Nicholson is a 49 year old female who presents to clinic today for the following health issues:    HPI   Diabetes Follow-up      How often are you checking your blood sugar? A few times a month    What time of day are you checking your blood sugars (select all that apply)?  Before meals    Have you had any blood sugars above 200?  No    Have you had any blood sugars below 70?  No    What symptoms do you notice when your blood sugar is low?  None    What concerns do you have today about your diabetes? None     Do you have any of these symptoms? (Select all that apply)  No numbness or tingling in feet.  No redness, sores or blisters on feet.  No complaints of excessive thirst.  No reports of blurry vision.  No significant changes to weight.     Have you had a diabetic eye exam in the last 12 months? Yes- Date of last eye exam: Per Patient 05/2019    Diabetes Management Resources    Hyperlipidemia Follow-Up      Are you having any of the following symptoms? (Select all that apply)  No complaints of shortness of breath, chest pain or pressure.  No increased sweating or nausea with activity.  No left-sided neck or arm pain.  No complaints of pain in calves when walking 1-2 blocks.    Are you regularly taking any medication or supplement to lower your cholesterol?   Yes- Atorvastatin    Are you having muscle aches or other side effects that you think could be caused by your cholesterol lowering medication?  No    Hypertension Follow-up      Do you check your blood pressure regularly outside of the clinic? No     Are you following a low salt diet? Yes    Are your blood pressures ever more than 140 on the top number (systolic) OR more   than 90 on the bottom number (diastolic), for example 140/90? No    BP Readings from Last 2 Encounters:   11/05/18 114/74   04/13/18 (!) 142/94     Hemoglobin A1C (%)   Date Value   07/26/2019 7.1 (H)   10/05/2018 6.5 (H)     LDL Cholesterol Calculated (mg/dL)   Date  Value   07/26/2019 107 (H)   10/05/2018 96     Hypothyroidism Follow-up      Since last visit, patient describes the following symptoms: Weight stable, no hair loss, no skin changes, no constipation, no loose stools      How many servings of fruits and vegetables do you eat daily?  2-3    On average, how many sweetened beverages do you drink each day (soda, juice, sweet tea, etc)?   1-2    How many days per week do you miss taking your medication? 0    Pt's past medical history, family history, habits, medications and allergies were reviewed with the patient today.  See snap shot for  HCM status. Most recent lab results reviewed with pt. Problem list and histories reviewed & adjusted, as indicated.  Additional history as below:       Denies CP, SOB, abdominal pain, polyuria, polydipsia, vision changes, extremity numbness/parasthesias or skin problems.  Weight up 10 pounds in 1 year. 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 Had not been following DM diet and drinking more soda pop and not exercising since got promoted to new job. This past 1 week, has started to exercise and follow diet again. Labs abnormal as above. Had been normal/at goal when weight down. Pt motivated to continue new lifestyle changes now that she has adjusted to new work responsibilities. Mood good. Recent PHQ=0    Component      Latest Ref Rng & Units 10/5/2018 7/26/2019   Sodium      133 - 144 mmol/L 140 138   Potassium      3.4 - 5.3 mmol/L 4.2 4.2   Chloride      94 - 109 mmol/L 105 105   Carbon Dioxide      20 - 32 mmol/L 27 25   Anion Gap      3 - 14 mmol/L 8 8   Glucose      70 - 99 mg/dL 97 140 (H)   Urea Nitrogen      7 - 30 mg/dL 10 12   Creatinine      0.52 - 1.04 mg/dL 0.69 0.72   GFR Estimate      >60 mL/min/1.73:m2 >90 >90   GFR Estimate If Black      >60 mL/min/1.73:m2 >90 >90   Calcium      8.5 - 10.1 mg/dL 8.8 8.8   Bilirubin Total      0.2 - 1.3 mg/dL 0.4 0.3   Albumin      3.4 - 5.0 g/dL 3.6 3.6   Protein Total      6.8 - 8.8 g/dL 7.6 7.9  "  Alkaline Phosphatase      40 - 150 U/L 50 60   ALT      0 - 50 U/L 38 85 (H)   AST      0 - 45 U/L 16 36   Bilirubin Direct      0.0 - 0.2 mg/dL  <0.1   Cholesterol      <200 mg/dL 161 185   Triglycerides      <150 mg/dL 121 197 (H)   HDL Cholesterol      >49 mg/dL 41 (L) 39 (L)   LDL Cholesterol Calculated      <100 mg/dL 96 107 (H)   Non HDL Cholesterol      <130 mg/dL 120 146 (H)   Creatinine Urine      mg/dL 211    Albumin Urine mg/L      mg/L 14    Albumin Urine mg/g Cr      0 - 25 mg/g Cr 6.82    Hemoglobin A1C      0 - 5.6 % 6.5 (H) 7.1 (H)   CK Total      30 - 225 U/L  155   TSH      0.40 - 4.00 mU/L  2.31        Additional ROS:   Constitutional, HEENT, Cardiovascular, Pulmonary, GI and , Neuro, MSK and Psych review of systems/symptoms are otherwise negative or unchanged from previous, except as noted above.      OBJECTIVE:  /72   Pulse 69   Temp 98.1  F (36.7  C) (Temporal)   Resp 16   Wt 95.3 kg (210 lb)   SpO2 100%   BMI 33.89 kg/m     Estimated body mass index is 33.89 kg/m  as calculated from the following:    Height as of 3/16/18: 1.676 m (5' 6\").    Weight as of this encounter: 95.3 kg (210 lb).     Neck: no adenopathy. Thyroid normal to palpation. No bruits  Pulm: Lungs clear to auscultation   CV: Regular rates and rhythm  GI: Soft, obese, nontender, Normal active bowel sounds, No hepatosplenomegaly or masses palpable  Ext: Peripheral pulses intact. No edema.  Neuro: Normal strength and tone, sensory exam grossly normal    Assessment/Plan: (See plan discussion below for further details)  1. Type 2 diabetes mellitus without complication, without long-term current use of insulin (H)  Needs better control. Had been at goal when weight down. See plan discussion below.   - metFORMIN (GLUCOPHAGE) 1000 MG tablet; TAKE 1 TABLET TWICE A DAY  WITH BREAKFAST AND DINNER  Dispense: 180 tablet; Refill: 3  - Hemoglobin A1c; Future  - Albumin Random Urine Quantitative with Creat Ratio; Future  - " Comprehensive metabolic panel; Future    2. Hyperlipidemia LDL goal <100  LDL above goal.  Had been down when weight down. See plan discussion below.   - atorvastatin (LIPITOR) 20 MG tablet; Take 1 tablet (20 mg) by mouth daily  Dispense: 90 tablet; Refill: 3  - Lipid panel reflex to direct LDL Fasting; Future  - Comprehensive metabolic panel; Future    3. Recurrent major depressive disorder, in full remission (H)  Controlled. Continue med  - DULoxetine (CYMBALTA) 30 MG capsule; TAKE 1 CAPSULE TWICE DAILY  Dispense: 180 capsule; Refill: 3    4. Hypothyroidism, unspecified type  Controlled. Continue med. Repeat lab 1 year  - levothyroxine (SYNTHROID/LEVOTHROID) 100 MCG tablet; Take 1 tablet (100 mcg) by mouth daily  Dispense: 90 tablet; Refill: 3  - TSH with free T4 reflex; Future    5. Essential hypertension with goal blood pressure less than 130/80  Controlled. Continue med  - lisinopril (PRINIVIL/ZESTRIL) 40 MG tablet; TAKE 1 TABLET DAILY (DOSE  CHANGE)  Dispense: 90 tablet; Refill: 2  - Comprehensive metabolic panel; Future    6. Hepatitis  ? fatty build-up in liver vs other. Will first recheck LFTs. If still elevated, will check RUQ U/S and do trial off of Atorvastatin  - Hepatic panel    7. Need for prophylactic vaccination and inoculation against influenza  - INFLUENZA VACCINE IM > 6 MONTHS VALENT IIV4 [25235]  - Vaccine Administration, Initial [29296]    Plan discussion:   Continue current meds  Prescriptions refilled.    Labs today as ordered for liver  Call  [phone] or use Eventup to schedule a future lab appointment  fasting in mid January 2020  Follow up with me a few days after these future labs are drawn to review results and other medical issues as necessary. Check blood sugars twice a day (before breakfast and bedtime) for 4 days prior to the appointment with me and bring the results to the appointment.  If sugars remain elevated, will add Invokana. If cholesterol numbers remain elevated, will  increase dose of Atorvastatin  Reduce calorie/carbohydrate (sugar, bread, potato, pasta, rice, alcohol etc)  intake in diet.  Increase color on your plate with fruits and vegetables. Increase  frequency of walking or other aerobic exercise as able (goal is daily)  Mammogram. May be [facility] to be done at the [facility]   Vaccinations: Influenza vaccine       Pablo Gomez MD  Internal Medicine Department  [facility]    (Chart documentation was completed, in part, with [employer] voice-recognition software. Even though reviewed, some grammatical, spelling, and word errors may remain.)

## 2019-10-05 DIAGNOSIS — K75.9 HEPATITIS: Primary | ICD-10-CM

## 2019-10-30 DIAGNOSIS — E78.5 HYPERLIPIDEMIA LDL GOAL <100: ICD-10-CM

## 2019-10-30 DIAGNOSIS — K75.9 HEPATITIS: ICD-10-CM

## 2019-10-30 LAB
ALBUMIN SERPL-MCNC: 3.8 G/DL (ref 3.4–5)
ALP SERPL-CCNC: 60 U/L (ref 40–150)
ALT SERPL W P-5'-P-CCNC: 187 U/L (ref 0–50)
ANION GAP SERPL CALCULATED.3IONS-SCNC: 6 MMOL/L (ref 3–14)
AST SERPL W P-5'-P-CCNC: 83 U/L (ref 0–45)
BILIRUB DIRECT SERPL-MCNC: 0.1 MG/DL (ref 0–0.2)
BILIRUB SERPL-MCNC: 0.4 MG/DL (ref 0.2–1.3)
BUN SERPL-MCNC: 11 MG/DL (ref 7–30)
CALCIUM SERPL-MCNC: 9.3 MG/DL (ref 8.5–10.1)
CHLORIDE SERPL-SCNC: 103 MMOL/L (ref 94–109)
CHOLEST SERPL-MCNC: 236 MG/DL
CO2 SERPL-SCNC: 28 MMOL/L (ref 20–32)
CREAT SERPL-MCNC: 0.71 MG/DL (ref 0.52–1.04)
GFR SERPL CREATININE-BSD FRML MDRD: >90 ML/MIN/{1.73_M2}
GLUCOSE SERPL-MCNC: 153 MG/DL (ref 70–99)
HDLC SERPL-MCNC: 40 MG/DL
LDLC SERPL CALC-MCNC: 157 MG/DL
NONHDLC SERPL-MCNC: 196 MG/DL
POTASSIUM SERPL-SCNC: 3.9 MMOL/L (ref 3.4–5.3)
PROT SERPL-MCNC: 7.8 G/DL (ref 6.8–8.8)
SODIUM SERPL-SCNC: 137 MMOL/L (ref 133–144)
TRIGL SERPL-MCNC: 195 MG/DL

## 2019-10-30 PROCEDURE — 80061 LIPID PANEL: CPT | Performed by: INTERNAL MEDICINE

## 2019-10-30 PROCEDURE — 36415 COLL VENOUS BLD VENIPUNCTURE: CPT | Performed by: INTERNAL MEDICINE

## 2019-10-30 PROCEDURE — 82248 BILIRUBIN DIRECT: CPT | Performed by: INTERNAL MEDICINE

## 2019-10-30 PROCEDURE — 80053 COMPREHEN METABOLIC PANEL: CPT | Performed by: INTERNAL MEDICINE

## 2019-12-15 ENCOUNTER — HEALTH MAINTENANCE LETTER (OUTPATIENT)
Age: 49
End: 2019-12-15

## 2019-12-18 DIAGNOSIS — E03.9 HYPOTHYROIDISM, UNSPECIFIED TYPE: ICD-10-CM

## 2019-12-18 RX ORDER — LEVOTHYROXINE SODIUM 100 MCG
TABLET ORAL
Qty: 90 TABLET | Refills: 1 | Status: SHIPPED | OUTPATIENT
Start: 2019-12-18 | End: 2020-09-13

## 2019-12-18 NOTE — TELEPHONE ENCOUNTER
"Requested Prescriptions   Pending Prescriptions Disp Refills     SYNTHROID 100 MCG tablet [Pharmacy Med Name: SYNTHROID TAB 0.1MG] 90 tablet 0     Sig: TAKE 1 TABLET DAILY       Thyroid Protocol Passed - 12/18/2019 12:09 PM        Passed - Patient is 12 years or older        Passed - Recent (12 mo) or future (30 days) visit within the authorizing provider's specialty     Patient has had an office visit with the authorizing provider or a provider within the authorizing providers department within the previous 12 mos or has a future within next 30 days. See \"Patient Info\" tab in inbasket, or \"Choose Columns\" in Meds & Orders section of the refill encounter.              Passed - Medication is active on med list        Passed - Normal TSH on file in past 12 months     Recent Labs   Lab Test 07/26/19  0934   TSH 2.31              Passed - No active pregnancy on record     If patient is pregnant or has had a positive pregnancy test, please check TSH.          Passed - No positive pregnancy test in past 12 months     If patient is pregnant or has had a positive pregnancy test, please check TSH.            "

## 2020-01-03 DIAGNOSIS — I10 ESSENTIAL HYPERTENSION WITH GOAL BLOOD PRESSURE LESS THAN 130/80: ICD-10-CM

## 2020-01-03 RX ORDER — LISINOPRIL 40 MG/1
TABLET ORAL
Qty: 90 TABLET | Refills: 2 | Status: SHIPPED | OUTPATIENT
Start: 2020-01-03 | End: 2020-09-09

## 2020-01-03 NOTE — TELEPHONE ENCOUNTER
"Requested Prescriptions   Pending Prescriptions Disp Refills     lisinopril (PRINIVIL/ZESTRIL) 40 MG tablet [Pharmacy Med Name: LISINOPRIL TAB 40MG] 90 tablet 2     Sig: TAKE 1 TABLET DAILY (DOSE  CHANGE)       ACE Inhibitors (Including Combos) Protocol Passed - 1/3/2020  7:55 AM        Passed - Blood pressure under 140/90 in past 12 months     BP Readings from Last 3 Encounters:   10/03/19 126/72   11/05/18 114/74   04/13/18 (!) 142/94                 Passed - Recent (12 mo) or future (30 days) visit within the authorizing provider's specialty     Patient has had an office visit with the authorizing provider or a provider within the authorizing providers department within the previous 12 mos or has a future within next 30 days. See \"Patient Info\" tab in inbasket, or \"Choose Columns\" in Meds & Orders section of the refill encounter.              Passed - Medication is active on med list        Passed - Patient is age 18 or older        Passed - No active pregnancy on record        Passed - Normal serum creatinine on file in past 12 months     Recent Labs   Lab Test 10/30/19  0952   CR 0.71             Passed - Normal serum potassium on file in past 12 months     Recent Labs   Lab Test 10/30/19  0952   POTASSIUM 3.9             Passed - No positive pregnancy test within past 12 months            Prescription approved per [facility].    Ynes HICKMANN, RN, PHN      "

## 2020-03-10 DIAGNOSIS — F33.42 RECURRENT MAJOR DEPRESSIVE DISORDER, IN FULL REMISSION (H): ICD-10-CM

## 2020-03-10 RX ORDER — DULOXETIN HYDROCHLORIDE 30 MG/1
CAPSULE, DELAYED RELEASE ORAL
Qty: 180 CAPSULE | Refills: 1 | Status: SHIPPED | OUTPATIENT
Start: 2020-03-10 | End: 2020-09-09

## 2020-03-10 NOTE — TELEPHONE ENCOUNTER
"Requested Prescriptions   Pending Prescriptions Disp Refills     DULoxetine (CYMBALTA) 30 MG capsule [Pharmacy Med Name: DULOXETINE CAP 30MG DR] 180 capsule 3     Sig: TAKE 1 CAPSULE TWICE DAILY       Serotonin-Norepinephrine Reuptake Inhibitors  Passed - 3/10/2020  3:26 AM        Passed - Blood pressure under 140/90 in past 12 months     BP Readings from Last 3 Encounters:   10/03/19 126/72   11/05/18 114/74   04/13/18 (!) 142/94                 Passed - PHQ-9 score of less than 5 in past 6 months     Please review last PHQ-9 score.           Passed - Medication is active on med list        Passed - Patient is age 18 or older        Passed - No active pregnancy on record        Passed - No positive pregnancy test in past 12 months        Passed - Recent (6 mo) or future (30 days) visit within the authorizing provider's specialty     Patient had office visit in the last 6 months or has a visit in the next 30 days with authorizing provider or within the authorizing provider's specialty.  See \"Patient Info\" tab in inbasket, or \"Choose Columns\" in Meds & Orders section of the refill encounter.                 "

## 2020-03-22 ENCOUNTER — HEALTH MAINTENANCE LETTER (OUTPATIENT)
Age: 50
End: 2020-03-22

## 2020-05-20 ENCOUNTER — TRANSFERRED RECORDS (OUTPATIENT)
Dept: HEALTH INFORMATION MANAGEMENT | Facility: CLINIC | Age: 50
End: 2020-05-20

## 2020-09-06 DIAGNOSIS — F33.42 RECURRENT MAJOR DEPRESSIVE DISORDER, IN FULL REMISSION (H): ICD-10-CM

## 2020-09-06 DIAGNOSIS — E03.9 HYPOTHYROIDISM, UNSPECIFIED TYPE: ICD-10-CM

## 2020-09-06 DIAGNOSIS — I10 ESSENTIAL HYPERTENSION WITH GOAL BLOOD PRESSURE LESS THAN 130/80: ICD-10-CM

## 2020-09-09 RX ORDER — DULOXETIN HYDROCHLORIDE 30 MG/1
CAPSULE, DELAYED RELEASE ORAL
Qty: 180 CAPSULE | Refills: 0 | Status: SHIPPED | OUTPATIENT
Start: 2020-09-09 | End: 2020-12-01

## 2020-09-09 RX ORDER — LISINOPRIL 40 MG/1
TABLET ORAL
Qty: 90 TABLET | Refills: 0 | Status: SHIPPED | OUTPATIENT
Start: 2020-09-09 | End: 2020-12-01

## 2020-09-13 RX ORDER — LEVOTHYROXINE SODIUM 100 MCG
TABLET ORAL
Qty: 90 TABLET | Refills: 0 | Status: SHIPPED | OUTPATIENT
Start: 2020-09-13 | End: 2020-12-01

## 2020-09-14 NOTE — TELEPHONE ENCOUNTER
Patient due for fasting blood and urine labs along with follow-up appointment with me in a week after labs are done. Check blood sugars twice a day (before breakfast and bedtime) for 4 days prior to the appointment with me, write them down and have them available to review with the appointment   Future labs ordered.  Call patient and assist in scheduling appointments.  Levothyroxine refilled for 90 days

## 2020-10-02 DIAGNOSIS — E78.5 HYPERLIPIDEMIA LDL GOAL <100: ICD-10-CM

## 2020-10-02 DIAGNOSIS — I10 ESSENTIAL HYPERTENSION WITH GOAL BLOOD PRESSURE LESS THAN 130/80: ICD-10-CM

## 2020-10-02 DIAGNOSIS — E11.9 TYPE 2 DIABETES MELLITUS WITHOUT COMPLICATION, WITHOUT LONG-TERM CURRENT USE OF INSULIN (H): ICD-10-CM

## 2020-10-02 DIAGNOSIS — E03.9 HYPOTHYROIDISM, UNSPECIFIED TYPE: ICD-10-CM

## 2020-10-02 LAB
ALBUMIN SERPL-MCNC: 3.6 G/DL (ref 3.4–5)
ALP SERPL-CCNC: 69 U/L (ref 40–150)
ALT SERPL W P-5'-P-CCNC: 123 U/L (ref 0–50)
ANION GAP SERPL CALCULATED.3IONS-SCNC: 7 MMOL/L (ref 3–14)
AST SERPL W P-5'-P-CCNC: 52 U/L (ref 0–45)
BILIRUB SERPL-MCNC: 0.4 MG/DL (ref 0.2–1.3)
BUN SERPL-MCNC: 10 MG/DL (ref 7–30)
CALCIUM SERPL-MCNC: 9 MG/DL (ref 8.5–10.1)
CHLORIDE SERPL-SCNC: 101 MMOL/L (ref 94–109)
CHOLEST SERPL-MCNC: 222 MG/DL
CO2 SERPL-SCNC: 27 MMOL/L (ref 20–32)
CREAT SERPL-MCNC: 0.72 MG/DL (ref 0.52–1.04)
CREAT UR-MCNC: 257 MG/DL
GFR SERPL CREATININE-BSD FRML MDRD: >90 ML/MIN/{1.73_M2}
GLUCOSE SERPL-MCNC: 142 MG/DL (ref 70–99)
HBA1C MFR BLD: 8 % (ref 0–5.6)
HDLC SERPL-MCNC: 38 MG/DL
LDLC SERPL CALC-MCNC: 139 MG/DL
MICROALBUMIN UR-MCNC: 28 MG/L
MICROALBUMIN/CREAT UR: 10.89 MG/G CR (ref 0–25)
NONHDLC SERPL-MCNC: 184 MG/DL
POTASSIUM SERPL-SCNC: 3.7 MMOL/L (ref 3.4–5.3)
PROT SERPL-MCNC: 8 G/DL (ref 6.8–8.8)
SODIUM SERPL-SCNC: 135 MMOL/L (ref 133–144)
TRIGL SERPL-MCNC: 223 MG/DL
TSH SERPL DL<=0.005 MIU/L-ACNC: 3.69 MU/L (ref 0.4–4)

## 2020-10-02 PROCEDURE — 83036 HEMOGLOBIN GLYCOSYLATED A1C: CPT | Performed by: INTERNAL MEDICINE

## 2020-10-02 PROCEDURE — 84443 ASSAY THYROID STIM HORMONE: CPT | Performed by: INTERNAL MEDICINE

## 2020-10-02 PROCEDURE — 80061 LIPID PANEL: CPT | Performed by: INTERNAL MEDICINE

## 2020-10-02 PROCEDURE — 82043 UR ALBUMIN QUANTITATIVE: CPT | Performed by: INTERNAL MEDICINE

## 2020-10-02 PROCEDURE — 80053 COMPREHEN METABOLIC PANEL: CPT | Performed by: INTERNAL MEDICINE

## 2020-10-02 PROCEDURE — 36415 COLL VENOUS BLD VENIPUNCTURE: CPT | Performed by: INTERNAL MEDICINE

## 2020-11-30 DIAGNOSIS — F33.42 RECURRENT MAJOR DEPRESSIVE DISORDER, IN FULL REMISSION (H): ICD-10-CM

## 2020-11-30 DIAGNOSIS — E03.9 HYPOTHYROIDISM, UNSPECIFIED TYPE: ICD-10-CM

## 2020-11-30 DIAGNOSIS — I10 ESSENTIAL HYPERTENSION WITH GOAL BLOOD PRESSURE LESS THAN 130/80: ICD-10-CM

## 2020-11-30 NOTE — LETTER
[facility]  [address] [id number] [phone]            Natali Nicholson  [address]  [facility] [address]        December 1, 2020    Dear Natali,    While refilling your prescription today, we noticed that you are due for an appointment with your provider.  We will refill your prescription for 30 days, but a follow-up appointment must be made before any additional refills can be approved.     Taking care of your health is important to us and we look forward to seeing you in the near future.  Please call us at [phone] or [phone] (or use Ingeny) to schedule an appointment.     Please disregard this notice if you have already made an appointment.    Sincerely,        [facility]

## 2020-12-01 RX ORDER — LISINOPRIL 40 MG/1
TABLET ORAL
Qty: 90 TABLET | Refills: 0 | Status: SHIPPED | OUTPATIENT
Start: 2020-12-01 | End: 2021-03-16

## 2020-12-01 RX ORDER — DULOXETIN HYDROCHLORIDE 30 MG/1
CAPSULE, DELAYED RELEASE ORAL
Qty: 180 CAPSULE | Refills: 0 | Status: SHIPPED | OUTPATIENT
Start: 2020-12-01 | End: 2021-03-01

## 2020-12-01 RX ORDER — LEVOTHYROXINE SODIUM 100 MCG
TABLET ORAL
Qty: 90 TABLET | Refills: 0 | Status: SHIPPED | OUTPATIENT
Start: 2020-12-01 | End: 2021-03-01

## 2021-01-15 ENCOUNTER — HEALTH MAINTENANCE LETTER (OUTPATIENT)
Age: 51
End: 2021-01-15

## 2021-01-23 ENCOUNTER — HEALTH MAINTENANCE LETTER (OUTPATIENT)
Age: 51
End: 2021-01-23

## 2021-02-21 DIAGNOSIS — E11.9 TYPE 2 DIABETES MELLITUS WITHOUT COMPLICATION, WITHOUT LONG-TERM CURRENT USE OF INSULIN (H): ICD-10-CM

## 2021-02-21 DIAGNOSIS — E03.9 HYPOTHYROIDISM, UNSPECIFIED TYPE: ICD-10-CM

## 2021-02-21 DIAGNOSIS — F33.42 RECURRENT MAJOR DEPRESSIVE DISORDER, IN FULL REMISSION (H): ICD-10-CM

## 2021-02-23 NOTE — TELEPHONE ENCOUNTER
Routing refill request to provider for review/approval because:  No current BP readings  Patient has not been seen >1 year    PHQ-9 score:    PHQ 9/16/2019   PHQ-9 Total Score 0   Q9: Thoughts of better off dead/self-harm past 2 weeks Not at all

## 2021-02-24 RX ORDER — BLOOD SUGAR DIAGNOSTIC
STRIP MISCELLANEOUS
Qty: 100 STRIP | Refills: 11 | Status: SHIPPED | OUTPATIENT
Start: 2021-02-24 | End: 2022-04-08

## 2021-02-24 RX ORDER — LANCETS
EACH MISCELLANEOUS
Qty: 100 EACH | Refills: 11 | Status: SHIPPED | OUTPATIENT
Start: 2021-02-24

## 2021-02-24 NOTE — TELEPHONE ENCOUNTER
Was told last September that was overdue for labs and MD appt.  Had labs but never seen back in clinic to review results,, blood pressure status, mental health, etc   Call pt and get fasting lab appt in the next 3 weeks followed by appt with me in clinic a few days later and then route RF request back to me to address. Will refill meds for 30 days AFTER appts are scheduled. Check blood sugars twice a day (before breakfast and bedtime) for 4 days prior to the appointment with me, write them down and have them available to review with the appointment. RXs for test strips and lancets sent to pharmacy

## 2021-03-01 RX ORDER — LEVOTHYROXINE SODIUM 100 MCG
TABLET ORAL
Qty: 90 TABLET | Refills: 0 | Status: SHIPPED | OUTPATIENT
Start: 2021-03-01 | End: 2021-03-18

## 2021-03-01 RX ORDER — DULOXETIN HYDROCHLORIDE 30 MG/1
CAPSULE, DELAYED RELEASE ORAL
Qty: 180 CAPSULE | Refills: 0 | Status: SHIPPED | OUTPATIENT
Start: 2021-03-01 | End: 2021-03-18

## 2021-03-01 NOTE — TELEPHONE ENCOUNTER
Meds refilled for 90 days. Will address future refills after labs and appt with me. Inform pt and then close enocunter

## 2021-03-03 ENCOUNTER — DOCUMENTATION ONLY (OUTPATIENT)
Dept: LAB | Facility: CLINIC | Age: 51
End: 2021-03-03

## 2021-03-03 DIAGNOSIS — E78.5 HYPERLIPIDEMIA LDL GOAL <100: Primary | ICD-10-CM

## 2021-03-03 DIAGNOSIS — E11.9 TYPE 2 DIABETES MELLITUS WITHOUT COMPLICATION, WITHOUT LONG-TERM CURRENT USE OF INSULIN (H): ICD-10-CM

## 2021-03-03 DIAGNOSIS — K75.9 HEPATITIS: ICD-10-CM

## 2021-03-15 DIAGNOSIS — I10 ESSENTIAL HYPERTENSION WITH GOAL BLOOD PRESSURE LESS THAN 130/80: ICD-10-CM

## 2021-03-16 DIAGNOSIS — E78.5 HYPERLIPIDEMIA LDL GOAL <100: ICD-10-CM

## 2021-03-16 DIAGNOSIS — E11.9 TYPE 2 DIABETES MELLITUS WITHOUT COMPLICATION, WITHOUT LONG-TERM CURRENT USE OF INSULIN (H): ICD-10-CM

## 2021-03-16 DIAGNOSIS — K75.9 HEPATITIS: ICD-10-CM

## 2021-03-16 LAB
ALBUMIN SERPL-MCNC: 3.7 G/DL (ref 3.4–5)
ALP SERPL-CCNC: 66 U/L (ref 40–150)
ALT SERPL W P-5'-P-CCNC: 148 U/L (ref 0–50)
ANION GAP SERPL CALCULATED.3IONS-SCNC: 2 MMOL/L (ref 3–14)
AST SERPL W P-5'-P-CCNC: 59 U/L (ref 0–45)
BILIRUB SERPL-MCNC: 0.5 MG/DL (ref 0.2–1.3)
BUN SERPL-MCNC: 7 MG/DL (ref 7–30)
CALCIUM SERPL-MCNC: 8.8 MG/DL (ref 8.5–10.1)
CHLORIDE SERPL-SCNC: 103 MMOL/L (ref 94–109)
CHOLEST SERPL-MCNC: 242 MG/DL
CO2 SERPL-SCNC: 30 MMOL/L (ref 20–32)
CREAT SERPL-MCNC: 0.72 MG/DL (ref 0.52–1.04)
ERYTHROCYTE [SEDIMENTATION RATE] IN BLOOD BY WESTERGREN METHOD: 11 MM/H (ref 0–20)
GFR SERPL CREATININE-BSD FRML MDRD: >90 ML/MIN/{1.73_M2}
GLUCOSE SERPL-MCNC: 164 MG/DL (ref 70–99)
HBA1C MFR BLD: 9.7 % (ref 0–5.6)
HBV SURFACE AG SERPL QL IA: NONREACTIVE
HCV AB SERPL QL IA: NONREACTIVE
HDLC SERPL-MCNC: 42 MG/DL
IRON SATN MFR SERPL: 33 % (ref 15–46)
IRON SERPL-MCNC: 112 UG/DL (ref 35–180)
LDLC SERPL CALC-MCNC: 162 MG/DL
NONHDLC SERPL-MCNC: 200 MG/DL
POTASSIUM SERPL-SCNC: 4.1 MMOL/L (ref 3.4–5.3)
PROT SERPL-MCNC: 8.1 G/DL (ref 6.8–8.8)
SODIUM SERPL-SCNC: 135 MMOL/L (ref 133–144)
TIBC SERPL-MCNC: 338 UG/DL (ref 240–430)
TRIGL SERPL-MCNC: 188 MG/DL

## 2021-03-16 PROCEDURE — 87340 HEPATITIS B SURFACE AG IA: CPT | Performed by: INTERNAL MEDICINE

## 2021-03-16 PROCEDURE — 80053 COMPREHEN METABOLIC PANEL: CPT | Performed by: INTERNAL MEDICINE

## 2021-03-16 PROCEDURE — 86803 HEPATITIS C AB TEST: CPT | Performed by: INTERNAL MEDICINE

## 2021-03-16 PROCEDURE — 80061 LIPID PANEL: CPT | Performed by: INTERNAL MEDICINE

## 2021-03-16 PROCEDURE — 83540 ASSAY OF IRON: CPT | Performed by: INTERNAL MEDICINE

## 2021-03-16 PROCEDURE — 83550 IRON BINDING TEST: CPT | Performed by: INTERNAL MEDICINE

## 2021-03-16 PROCEDURE — 36415 COLL VENOUS BLD VENIPUNCTURE: CPT | Performed by: INTERNAL MEDICINE

## 2021-03-16 PROCEDURE — 83036 HEMOGLOBIN GLYCOSYLATED A1C: CPT | Performed by: INTERNAL MEDICINE

## 2021-03-16 PROCEDURE — 85652 RBC SED RATE AUTOMATED: CPT | Performed by: INTERNAL MEDICINE

## 2021-03-16 RX ORDER — LISINOPRIL 40 MG/1
TABLET ORAL
Qty: 90 TABLET | Refills: 1 | Status: SHIPPED | OUTPATIENT
Start: 2021-03-16 | End: 2021-03-18

## 2021-03-18 ENCOUNTER — OFFICE VISIT (OUTPATIENT)
Dept: INTERNAL MEDICINE | Facility: CLINIC | Age: 51
End: 2021-03-18
Payer: COMMERCIAL

## 2021-03-18 VITALS
HEIGHT: 64 IN | BODY MASS INDEX: 35.85 KG/M2 | TEMPERATURE: 97.5 F | RESPIRATION RATE: 16 BRPM | SYSTOLIC BLOOD PRESSURE: 126 MMHG | WEIGHT: 210 LBS | OXYGEN SATURATION: 100 % | DIASTOLIC BLOOD PRESSURE: 80 MMHG | HEART RATE: 77 BPM

## 2021-03-18 DIAGNOSIS — K75.9 HEPATITIS: ICD-10-CM

## 2021-03-18 DIAGNOSIS — E03.9 HYPOTHYROIDISM, UNSPECIFIED TYPE: ICD-10-CM

## 2021-03-18 DIAGNOSIS — F33.42 RECURRENT MAJOR DEPRESSIVE DISORDER, IN FULL REMISSION (H): ICD-10-CM

## 2021-03-18 DIAGNOSIS — E11.9 TYPE 2 DIABETES MELLITUS WITHOUT COMPLICATION, WITHOUT LONG-TERM CURRENT USE OF INSULIN (H): Primary | ICD-10-CM

## 2021-03-18 DIAGNOSIS — I10 ESSENTIAL HYPERTENSION WITH GOAL BLOOD PRESSURE LESS THAN 130/80: ICD-10-CM

## 2021-03-18 DIAGNOSIS — E78.5 HYPERLIPIDEMIA LDL GOAL <100: ICD-10-CM

## 2021-03-18 DIAGNOSIS — E66.01 SEVERE OBESITY WITH BODY MASS INDEX (BMI) OF 35.0 TO 39.9 WITH COMORBIDITY (H): ICD-10-CM

## 2021-03-18 PROCEDURE — 99207 PR FOOT EXAM NO CHARGE: CPT | Mod: 25 | Performed by: INTERNAL MEDICINE

## 2021-03-18 PROCEDURE — 99214 OFFICE O/P EST MOD 30 MIN: CPT | Performed by: INTERNAL MEDICINE

## 2021-03-18 RX ORDER — LISINOPRIL 40 MG/1
TABLET ORAL
Qty: 90 TABLET | Refills: 3 | Status: SHIPPED | OUTPATIENT
Start: 2021-03-18 | End: 2021-03-28

## 2021-03-18 RX ORDER — DULAGLUTIDE 0.75 MG/.5ML
0.75 INJECTION, SOLUTION SUBCUTANEOUS
Qty: 2 ML | Refills: 11 | Status: SHIPPED | OUTPATIENT
Start: 2021-03-18 | End: 2022-02-24

## 2021-03-18 RX ORDER — DULOXETIN HYDROCHLORIDE 30 MG/1
CAPSULE, DELAYED RELEASE ORAL
Qty: 180 CAPSULE | Refills: 3 | Status: SHIPPED | OUTPATIENT
Start: 2021-03-18 | End: 2021-03-28

## 2021-03-18 RX ORDER — LEVOTHYROXINE SODIUM 100 UG/1
100 TABLET ORAL DAILY
Qty: 90 TABLET | Refills: 3 | Status: SHIPPED | OUTPATIENT
Start: 2021-03-18 | End: 2021-03-28

## 2021-03-18 ASSESSMENT — MIFFLIN-ST. JEOR: SCORE: 1567.55

## 2021-03-18 ASSESSMENT — PATIENT HEALTH QUESTIONNAIRE - PHQ9: SUM OF ALL RESPONSES TO PHQ QUESTIONS 1-9: 0

## 2021-03-18 NOTE — PATIENT INSTRUCTIONS
Trulicity 0.75mg injection once a week in abdominal skin. Inform MD if nausea side effects    Continue Metformin   See me the week of April 12th. OK for virtual appt   Get a nonfasting liver lab done 1-2 days prior to the appt with me.   Reduce calorie/carbohydrate (sugar, bread, potato, pasta, rice, alcohol etc)  intake in diet.  Increase color on your plate with fruits and vegetables. Increase  frequency of walking or other aerobic exercise as able (goal is daily)  Check blood sugars twice a day (before breakfast and supper on even days,  and before lunch and bedtime on odd days) for 8 days   prior to the appointment with me,  write them down and have them available to review with the appointment  Continue other meds   Liver ultrasound and  elastography to assess for scarring in the liver. [facility] should call or you may call [phone]

## 2021-03-18 NOTE — PROGRESS NOTES
Assessment & Plan     ASSESSMENT:    1. Type 2 diabetes mellitus without complication, without long-term current use of insulin (H)  Uncontrolled.  Will add Trulicity.  Continue Metformin.  Repeat labs 3 months  - FOOT EXAM  - dulaglutide (TRULICITY) 0.75 MG/0.5ML pen; Inject 0.75 mg Subcutaneous every 7 days  Dispense: 2 mL; Refill: 11  - Hemoglobin A1c; Future  - Albumin Random Urine Quantitative with Creat Ratio; Future  - metFORMIN (GLUCOPHAGE) 1000 MG tablet; TAKE 1 TABLET TWICE A DAY  WITH BREAKFAST AND DINNER  Dispense: 180 tablet; Refill: 3    2. Severe obesity with body mass index (BMI) of 35.0 to 39.9 with comorbidity (H)  Obesity contributing to diabetes, hepatitis with fatty buildup, hyperlipidemia, hypertension.  Counseled guarding calorie/carbohydrate reduction and increase in exercise.  We will also start Trulicity and titrate dose up as able  - dulaglutide (TRULICITY) 0.75 MG/0.5ML pen; Inject 0.75 mg Subcutaneous every 7 days  Dispense: 2 mL; Refill: 11    3. Recurrent major depressive disorder, in full remission (H)  Controlled.PHQ=0.  Continue current medication  - DULoxetine (CYMBALTA) 30 MG capsule; TAKE 1 CAPSULE TWICE DAILY  Dispense: 180 capsule; Refill: 3    4. Hepatitis  Prior testing for viral cause, iron, immune reaction negative.  Appears related to steatohepatitis and poorly controlled diabetes.  Blood sugar management as above.  Will check ultrasound including elastography to assess for early cirrhosis.  Repeat nonfasting liver lab in a month  - US Elastography with Abdomen Limited; Future  - Hepatic panel; Future    5. Hyperlipidemia LDL goal <100  Lipids elevated.,  Will start statin therapy when LFTs improved  - Lipid panel reflex to direct LDL Fasting; Future    6. Hypothyroidism, unspecified type  Thyroid function 6 months ago at goal.  Continue current medication.  Future labs ordered  - TSH with free T4 reflex; Future  - levothyroxine (SYNTHROID) 100 MCG tablet; Take 1  tablet (100 mcg) by mouth daily  Dispense: 90 tablet; Refill: 3    7. Essential hypertension with goal blood pressure less than 130/80  Controlled.  Continue current medication  - lisinopril (ZESTRIL) 40 MG tablet; TAKE 1 TABLET DAILY (DOSE  CHANGE)  Dispense: 90 tablet; Refill: 3            PLAN:   Trulicity 0.75mg injection once a week in abdominal skin. Inform MD if nausea side effects    Continue Metformin   See me the week of April 12th. OK for virtual appt   Get a nonfasting liver lab done 1-2 days prior to the appt with me.   Reduce calorie/carbohydrate (sugar, bread, potato, pasta, rice, alcohol etc)  intake in diet.  Increase color on your plate with fruits and vegetables. Increase  frequency of walking or other aerobic exercise as able (goal is daily)  Check blood sugars twice a day (before breakfast and supper on even days,  and before lunch and bedtime on odd days) for 8 days   prior to the appointment with me,  write them down and have them available to review with the appointment  Continue other meds   Liver ultrasound and  elastography to assess for scarring in the liver. [facility] should call or you may call [phone]      (Chart documentation was completed, in part, with [employer] voice-recognition software. Even though reviewed, some grammatical, spelling, and word errors may remain.)    Pablo Gomez MD  Internal Medicine Department  [facility]      Claudine Hurst is a 50 year old who presents for the following health issues     HPI     Diabetes Follow-up    How often are you checking your blood sugar? A few times a week  What time of day are you checking your blood sugars (select all that apply)?  Varies  Have you had any blood sugars above 200?  Yes today at 241  Have you had any blood sugars below 70?  No    What symptoms do you notice when your blood sugar is low?  None    What concerns do you have today about your diabetes? None     Do you have any of these  symptoms? (Select all that apply)  No numbness or tingling in feet.  No redness, sores or blisters on feet.  No complaints of excessive thirst.  No reports of blurry vision.  No significant changes to weight.            Hyperlipidemia Follow-Up      Are you regularly taking any medication or supplement to lower your cholesterol?   No    Are you having muscle aches or other side effects that you think could be caused by your cholesterol lowering medication?  No    Hypertension Follow-up      Do you check your blood pressure regularly outside of the clinic? No     Are you following a low salt diet? Yes    Are your blood pressures ever more than 140 on the top number (systolic) OR more   than 90 on the bottom number (diastolic), for example 140/90? WNL in Clinic    BP Readings from Last 2 Encounters:   10/03/19 126/72   11/05/18 114/74     Hemoglobin A1C (%)   Date Value   03/16/2021 9.7 (H)   10/02/2020 8.0 (H)     LDL Cholesterol Calculated (mg/dL)   Date Value   03/16/2021 162 (H)   10/02/2020 139 (H)       Hypothyroidism Follow-up      Since last visit, patient describes the following symptoms: Weight stable, no hair loss, no skin changes, no constipation, no loose stools     Most recent lab results reviewed with pt.      Component      Latest Ref Rng & Units 3/16/2021   Sodium      133 - 144 mmol/L 135   Potassium      3.4 - 5.3 mmol/L 4.1   Chloride      94 - 109 mmol/L 103   Carbon Dioxide      20 - 32 mmol/L 30   Anion Gap      3 - 14 mmol/L 2 (L)   Glucose      70 - 99 mg/dL 164 (H)   Urea Nitrogen      7 - 30 mg/dL 7   Creatinine      0.52 - 1.04 mg/dL 0.72   GFR Estimate      >60 mL/min/1.73:m2 >90   GFR Estimate If Black      >60 mL/min/1.73:m2 >90   Calcium      8.5 - 10.1 mg/dL 8.8   Bilirubin Total      0.2 - 1.3 mg/dL 0.5   Albumin      3.4 - 5.0 g/dL 3.7   Protein Total      6.8 - 8.8 g/dL 8.1   Alkaline Phosphatase      40 - 150 U/L 66   ALT      0 - 50 U/L 148 (H)   AST      0 - 45 U/L 59 (H)  "  Cholesterol      <200 mg/dL 242 (H)   Triglycerides      <150 mg/dL 188 (H)   HDL Cholesterol      >49 mg/dL 42 (L)   LDL Cholesterol Calculated      <100 mg/dL 162 (H)   Non HDL Cholesterol      <130 mg/dL 200 (H)   Iron      35 - 180 ug/dL 112   Iron Binding Cap      240 - 430 ug/dL 338   Iron Saturation Index      15 - 46 % 33   Sed Rate      0 - 20 mm/h 11   Hepatitis C Antibody      NR:Nonreactive Nonreactive   Hep B Surface Agn      NR:Nonreactive Nonreactive   Hemoglobin A1C      0 - 5.6 % 9.7 (H)     Weight unchanged last year after previously getting 12 pounds the year before   Sugars 120s the past 2 mornings. 260 this afternoon   Has been eating more carbs/sweets at work recently  No exercise  Denies CP, SOB, abdominal pain, polyuria, polydipsia, vision changes, extremity numbness/parasthesias or skin problems.  Hx chronic hepatitis/ Prior U/S many years ago with steatohepatitis. No ETOH       Additional ROS:   Constitutional, HEENT, Cardiovascular, Pulmonary, GI and , Neuro, MSK and Psych review of systems/symptoms are otherwise negative or unchanged from previous, except as noted above.      OBJECTIVE:  /80   Pulse 77   Temp 97.5  F (36.4  C) (Temporal)   Resp 16   Ht 1.626 m (5' 4\")   Wt 95.3 kg (210 lb)   SpO2 100%   BMI 36.05 kg/m     Estimated body mass index is 36.05 kg/m  as calculated from the following:    Height as of this encounter: 1.626 m (5' 4\").    Weight as of this encounter: 95.3 kg (210 lb).     Neck: no adenopathy. Thyroid normal to palpation. No bruits  Pulm: Lungs clear to auscultation   CV: Regular rates and rhythm  GI: Soft, obese, nontender, Normal active bowel sounds, No hepatosplenomegaly or masses palpable  Ext: Peripheral pulses intact. No edema. Normal DM foot exam  Neuro: Normal strength and tone, sensory exam grossly normal              "

## 2021-03-26 ENCOUNTER — MYC MEDICAL ADVICE (OUTPATIENT)
Dept: INTERNAL MEDICINE | Facility: CLINIC | Age: 51
End: 2021-03-26

## 2021-03-26 ENCOUNTER — ANCILLARY PROCEDURE (OUTPATIENT)
Dept: ULTRASOUND IMAGING | Facility: CLINIC | Age: 51
End: 2021-03-26
Attending: INTERNAL MEDICINE
Payer: COMMERCIAL

## 2021-03-26 DIAGNOSIS — E11.9 TYPE 2 DIABETES MELLITUS WITHOUT COMPLICATION, WITHOUT LONG-TERM CURRENT USE OF INSULIN (H): ICD-10-CM

## 2021-03-26 DIAGNOSIS — I10 ESSENTIAL HYPERTENSION WITH GOAL BLOOD PRESSURE LESS THAN 130/80: ICD-10-CM

## 2021-03-26 DIAGNOSIS — K75.9 HEPATITIS: ICD-10-CM

## 2021-03-26 DIAGNOSIS — F33.42 RECURRENT MAJOR DEPRESSIVE DISORDER, IN FULL REMISSION (H): ICD-10-CM

## 2021-03-26 DIAGNOSIS — E03.9 HYPOTHYROIDISM, UNSPECIFIED TYPE: ICD-10-CM

## 2021-03-26 PROCEDURE — 76981 USE PARENCHYMA: CPT | Mod: GC | Performed by: RADIOLOGY

## 2021-03-28 RX ORDER — LEVOTHYROXINE SODIUM 100 UG/1
100 TABLET ORAL DAILY
Qty: 90 TABLET | Refills: 3 | Status: SHIPPED | OUTPATIENT
Start: 2021-03-28 | End: 2022-04-08

## 2021-03-28 RX ORDER — LISINOPRIL 40 MG/1
TABLET ORAL
Qty: 90 TABLET | Refills: 3 | Status: SHIPPED | OUTPATIENT
Start: 2021-03-28 | End: 2022-04-08

## 2021-03-28 RX ORDER — DULOXETIN HYDROCHLORIDE 30 MG/1
CAPSULE, DELAYED RELEASE ORAL
Qty: 180 CAPSULE | Refills: 3 | Status: SHIPPED | OUTPATIENT
Start: 2021-03-28 | End: 2022-04-08

## 2021-04-12 DIAGNOSIS — E03.9 HYPOTHYROIDISM, UNSPECIFIED TYPE: ICD-10-CM

## 2021-04-12 DIAGNOSIS — E78.5 HYPERLIPIDEMIA LDL GOAL <100: ICD-10-CM

## 2021-04-12 DIAGNOSIS — K75.9 HEPATITIS: ICD-10-CM

## 2021-04-12 DIAGNOSIS — E11.9 TYPE 2 DIABETES MELLITUS WITHOUT COMPLICATION, WITHOUT LONG-TERM CURRENT USE OF INSULIN (H): ICD-10-CM

## 2021-04-12 LAB
ALBUMIN SERPL-MCNC: 3.9 G/DL (ref 3.4–5)
ALP SERPL-CCNC: 59 U/L (ref 40–150)
ALT SERPL W P-5'-P-CCNC: 72 U/L (ref 0–50)
AST SERPL W P-5'-P-CCNC: 34 U/L (ref 0–45)
BILIRUB DIRECT SERPL-MCNC: 0.1 MG/DL (ref 0–0.2)
BILIRUB SERPL-MCNC: 0.5 MG/DL (ref 0.2–1.3)
CHOLEST SERPL-MCNC: 212 MG/DL
CREAT UR-MCNC: 278 MG/DL
HBA1C MFR BLD: 8.5 % (ref 0–5.6)
HDLC SERPL-MCNC: 43 MG/DL
LDLC SERPL CALC-MCNC: 144 MG/DL
MICROALBUMIN UR-MCNC: 22 MG/L
MICROALBUMIN/CREAT UR: 7.73 MG/G CR (ref 0–25)
NONHDLC SERPL-MCNC: 169 MG/DL
PROT SERPL-MCNC: 8.1 G/DL (ref 6.8–8.8)
TRIGL SERPL-MCNC: 127 MG/DL
TSH SERPL DL<=0.005 MIU/L-ACNC: 0.86 MU/L (ref 0.4–4)

## 2021-04-12 PROCEDURE — 83036 HEMOGLOBIN GLYCOSYLATED A1C: CPT | Performed by: INTERNAL MEDICINE

## 2021-04-12 PROCEDURE — 80061 LIPID PANEL: CPT | Performed by: INTERNAL MEDICINE

## 2021-04-12 PROCEDURE — 82043 UR ALBUMIN QUANTITATIVE: CPT | Performed by: INTERNAL MEDICINE

## 2021-04-12 PROCEDURE — 84443 ASSAY THYROID STIM HORMONE: CPT | Performed by: INTERNAL MEDICINE

## 2021-04-12 PROCEDURE — 80076 HEPATIC FUNCTION PANEL: CPT | Performed by: INTERNAL MEDICINE

## 2021-04-12 PROCEDURE — 36415 COLL VENOUS BLD VENIPUNCTURE: CPT | Performed by: INTERNAL MEDICINE

## 2021-04-15 ENCOUNTER — VIRTUAL VISIT (OUTPATIENT)
Dept: INTERNAL MEDICINE | Facility: CLINIC | Age: 51
End: 2021-04-15
Payer: COMMERCIAL

## 2021-04-15 DIAGNOSIS — Z91.199 NO-SHOW FOR APPOINTMENT: Primary | ICD-10-CM

## 2021-04-15 NOTE — PROGRESS NOTES
"Pt failed to respond to multiple telephone calls from nurse for pre-visit discussion for scheduled video visit   \"Erroneous encounter\"  billing  and  \"no show\" diagnosis submitted.  Encounter entered so it could be closed by MD. Originally opened for PVP    "

## 2021-05-04 ENCOUNTER — IMMUNIZATION (OUTPATIENT)
Dept: NURSING | Facility: CLINIC | Age: 51
End: 2021-05-04
Payer: COMMERCIAL

## 2021-05-04 PROCEDURE — 0001A PR COVID VAC PFIZER DIL RECON 30 MCG/0.3 ML IM: CPT

## 2021-05-04 PROCEDURE — 91300 PR COVID VAC PFIZER DIL RECON 30 MCG/0.3 ML IM: CPT

## 2021-05-25 ENCOUNTER — IMMUNIZATION (OUTPATIENT)
Dept: NURSING | Facility: CLINIC | Age: 51
End: 2021-05-25
Attending: INTERNAL MEDICINE
Payer: COMMERCIAL

## 2021-05-25 PROCEDURE — 0002A PR COVID VAC PFIZER DIL RECON 30 MCG/0.3 ML IM: CPT

## 2021-05-25 PROCEDURE — 91300 PR COVID VAC PFIZER DIL RECON 30 MCG/0.3 ML IM: CPT

## 2021-09-15 ENCOUNTER — TRANSFERRED RECORDS (OUTPATIENT)
Dept: HEALTH INFORMATION MANAGEMENT | Facility: CLINIC | Age: 51
End: 2021-09-15
Payer: COMMERCIAL

## 2021-09-15 LAB — RETINOPATHY: NEGATIVE

## 2021-09-22 ENCOUNTER — ANCILLARY PROCEDURE (OUTPATIENT)
Dept: GENERAL RADIOLOGY | Facility: CLINIC | Age: 51
End: 2021-09-22
Attending: PHYSICIAN ASSISTANT
Payer: COMMERCIAL

## 2021-09-22 ENCOUNTER — OFFICE VISIT (OUTPATIENT)
Dept: INTERNAL MEDICINE | Facility: CLINIC | Age: 51
End: 2021-09-22
Payer: COMMERCIAL

## 2021-09-22 VITALS
TEMPERATURE: 98.7 F | SYSTOLIC BLOOD PRESSURE: 120 MMHG | RESPIRATION RATE: 16 BRPM | WEIGHT: 198 LBS | HEART RATE: 68 BPM | DIASTOLIC BLOOD PRESSURE: 80 MMHG | OXYGEN SATURATION: 100 % | BODY MASS INDEX: 33.99 KG/M2

## 2021-09-22 DIAGNOSIS — M79.671 RIGHT FOOT PAIN: Primary | ICD-10-CM

## 2021-09-22 DIAGNOSIS — M79.671 RIGHT FOOT PAIN: ICD-10-CM

## 2021-09-22 PROCEDURE — 99213 OFFICE O/P EST LOW 20 MIN: CPT | Performed by: PHYSICIAN ASSISTANT

## 2021-09-22 PROCEDURE — 73630 X-RAY EXAM OF FOOT: CPT | Mod: RT | Performed by: RADIOLOGY

## 2021-09-22 NOTE — PROGRESS NOTES
"    Assessment & Plan     Right foot pain  Will notify of results via my chart.   Icing, NSAID reviewed good footwear    if not improving seen pod  - XR Foot Right G/E 3 Views; Future             BMI:   Estimated body mass index is 33.99 kg/m  as calculated from the following:    Height as of 3/18/21: 1.626 m (5' 4\").    Weight as of this encounter: 89.8 kg (198 lb).   Weight management plan: Patient was referred to their PCP to discuss a diet and exercise plan.    Patient Instructions   Icing ibuprofen       Return in about 2 weeks (around 10/6/2021) for recheck if not improving podiatry.    Robny Lee PA-C  [facility] is [facility] 51 year old who presents for the following health issues     HPI     Musculoskeletal problem/pain  Onset/Duration: 1 week  Description  Location: foot - right  Joint Swelling: no  Redness: no  Pain: YES  Warmth: no  Intensity:  moderate  Progression of Symptoms:  same  Accompanying signs and symptoms:   Fevers: no  Numbness/tingling/weakness: no  History  Trauma to the area: no  Recent illness:  no  Previous similar problem: no  Previous evaluation:  no  Precipitating or alleviating factors:  Aggravating factors include: overuse  Therapies tried and outcome: rest/inactivity        Review of Systems   Constitutional, MS negative       Objective    /80   Pulse 68   Temp 98.7  F (37.1  C) (Tympanic)   Resp 16   Wt 89.8 kg (198 lb)   LMP 09/08/2021 (Approximate)   SpO2 100%   BMI 33.99 kg/m    Body mass index is 33.99 kg/m .  Physical Exam   GENERAL: healthy, alert and no distress  RESP: lungs clear to auscultation - no rales, rhonchi or wheezes  CV: regular rates and rhythm  MS: tenderness lateral right foot fifth metatarsal   SKIN: no suspicious lesions or rashes                "

## 2021-10-19 PROBLEM — F32.9 MAJOR DEPRESSION: Status: ACTIVE | Noted: 2018-04-13

## 2021-10-24 ENCOUNTER — HEALTH MAINTENANCE LETTER (OUTPATIENT)
Age: 51
End: 2021-10-24

## 2021-12-06 ENCOUNTER — DOCUMENTATION ONLY (OUTPATIENT)
Dept: LAB | Facility: CLINIC | Age: 51
End: 2021-12-06
Payer: COMMERCIAL

## 2021-12-06 DIAGNOSIS — E11.9 TYPE 2 DIABETES MELLITUS WITHOUT COMPLICATION, WITHOUT LONG-TERM CURRENT USE OF INSULIN (H): Primary | ICD-10-CM

## 2021-12-07 ENCOUNTER — APPOINTMENT (OUTPATIENT)
Dept: LAB | Facility: CLINIC | Age: 51
End: 2021-12-07
Payer: COMMERCIAL

## 2021-12-07 NOTE — PROGRESS NOTES
Spoke with lab. Informed them labs were ordered.  Labs states that they talked with my nurse Robyn earlier today who told them patient needed an appointment before labs to be drawn which is incorrect and this was never discussed with me by my nurse and patient was therefore sent home without a blood draw.  Patient should have follow-up appointment with me but needs lab work done prior to that.  Patient to have labs done as ordered nonfasting.  Lab will call patient to reschedule lab appointment

## 2022-02-13 ENCOUNTER — HEALTH MAINTENANCE LETTER (OUTPATIENT)
Age: 52
End: 2022-02-13

## 2022-02-23 DIAGNOSIS — E03.9 HYPOTHYROIDISM, UNSPECIFIED TYPE: Primary | ICD-10-CM

## 2022-02-23 DIAGNOSIS — E78.5 HYPERLIPIDEMIA LDL GOAL <100: ICD-10-CM

## 2022-02-23 DIAGNOSIS — E11.9 TYPE 2 DIABETES MELLITUS WITHOUT COMPLICATION, WITHOUT LONG-TERM CURRENT USE OF INSULIN (H): ICD-10-CM

## 2022-02-24 RX ORDER — DULAGLUTIDE 0.75 MG/.5ML
INJECTION, SOLUTION SUBCUTANEOUS
Qty: 2 ML | Refills: 0 | Status: SHIPPED | OUTPATIENT
Start: 2022-02-24 | End: 2022-03-25

## 2022-02-24 NOTE — TELEPHONE ENCOUNTER
Routing refill request to provider for review/approval because:  Labs out of range:  hgba1c & appt

## 2022-02-25 ENCOUNTER — MYC REFILL (OUTPATIENT)
Dept: LAB | Facility: CLINIC | Age: 52
End: 2022-02-25
Payer: COMMERCIAL

## 2022-02-25 DIAGNOSIS — E11.9 TYPE 2 DIABETES MELLITUS WITHOUT COMPLICATION, WITHOUT LONG-TERM CURRENT USE OF INSULIN (H): ICD-10-CM

## 2022-02-25 RX ORDER — DULAGLUTIDE 0.75 MG/.5ML
INJECTION, SOLUTION SUBCUTANEOUS
Qty: 2 ML | Refills: 0 | OUTPATIENT
Start: 2022-02-25

## 2022-02-25 NOTE — TELEPHONE ENCOUNTER
This refill is a duplicate of something already sent to the pharmacy or another refill already in process.   Anamika Hollingsworth RN  [facility]

## 2022-02-25 NOTE — TELEPHONE ENCOUNTER
Pt seen 1 year ago. Poor control diabetic blood sugars  at that time. Was supposed to see me 1 month later and no show for appt. medication refilled for 1 month only. Please call pt and get appointment scheduled with me in the next month. Per recent note from Admin, will be reducing same day appt slots soon to 2/day. Therefore OK to use a future  same day appt slot to get pt scheduled in the next 1 month.  Also schedule fasting lab appt in the next 1 week so have results to review when pt seen back in clinic. Lab will be blood and urine. Check blood sugars twice a day (before breakfast and bedtime) for 4 days prior to the appointment with me, write them down and have them available to review with the appointment

## 2022-03-03 DIAGNOSIS — E11.9 TYPE 2 DIABETES MELLITUS WITHOUT COMPLICATION, WITHOUT LONG-TERM CURRENT USE OF INSULIN (H): ICD-10-CM

## 2022-03-03 NOTE — TELEPHONE ENCOUNTER
Routing refill request to provider for review/approval because:  Labs out of date and range:    Lab Results   Component Value Date    A1C 8.5 04/12/2021    A1C 9.7 03/16/2021    A1C 8.0 10/02/2020    A1C 7.1 07/26/2019    A1C 6.5 10/05/2018       Connie Hicks RN

## 2022-03-04 NOTE — TELEPHONE ENCOUNTER
Has labs scheduled for March and follow-up appoint with me in April.  Medication refilled for 90 days

## 2022-03-08 ENCOUNTER — LAB (OUTPATIENT)
Dept: LAB | Facility: CLINIC | Age: 52
End: 2022-03-08
Payer: COMMERCIAL

## 2022-03-08 DIAGNOSIS — E11.9 TYPE 2 DIABETES MELLITUS WITHOUT COMPLICATION, WITHOUT LONG-TERM CURRENT USE OF INSULIN (H): ICD-10-CM

## 2022-03-08 DIAGNOSIS — E03.9 HYPOTHYROIDISM, UNSPECIFIED TYPE: ICD-10-CM

## 2022-03-08 DIAGNOSIS — E78.5 HYPERLIPIDEMIA LDL GOAL <100: ICD-10-CM

## 2022-03-08 LAB
ALBUMIN SERPL-MCNC: 3.5 G/DL (ref 3.4–5)
ALP SERPL-CCNC: 45 U/L (ref 40–150)
ALT SERPL W P-5'-P-CCNC: 34 U/L (ref 0–50)
ANION GAP SERPL CALCULATED.3IONS-SCNC: 8 MMOL/L (ref 3–14)
AST SERPL W P-5'-P-CCNC: 19 U/L (ref 0–45)
BILIRUB SERPL-MCNC: 0.5 MG/DL (ref 0.2–1.3)
BUN SERPL-MCNC: 10 MG/DL (ref 7–30)
CALCIUM SERPL-MCNC: 8.7 MG/DL (ref 8.5–10.1)
CHLORIDE BLD-SCNC: 106 MMOL/L (ref 94–109)
CHOLEST SERPL-MCNC: 219 MG/DL
CO2 SERPL-SCNC: 23 MMOL/L (ref 20–32)
CREAT SERPL-MCNC: 0.77 MG/DL (ref 0.52–1.04)
CREAT UR-MCNC: 139 MG/DL
FASTING STATUS PATIENT QL REPORTED: YES
GFR SERPL CREATININE-BSD FRML MDRD: >90 ML/MIN/1.73M2
GLUCOSE BLD-MCNC: 101 MG/DL (ref 70–99)
HBA1C MFR BLD: 6.6 % (ref 0–5.6)
HDLC SERPL-MCNC: 38 MG/DL
LDLC SERPL CALC-MCNC: 147 MG/DL
MICROALBUMIN UR-MCNC: 8 MG/L
MICROALBUMIN/CREAT UR: 5.76 MG/G CR (ref 0–25)
NONHDLC SERPL-MCNC: 181 MG/DL
POTASSIUM BLD-SCNC: 3.8 MMOL/L (ref 3.4–5.3)
PROT SERPL-MCNC: 7.8 G/DL (ref 6.8–8.8)
SODIUM SERPL-SCNC: 137 MMOL/L (ref 133–144)
TRIGL SERPL-MCNC: 169 MG/DL
TSH SERPL DL<=0.005 MIU/L-ACNC: 2.46 MU/L (ref 0.4–4)

## 2022-03-08 PROCEDURE — 80053 COMPREHEN METABOLIC PANEL: CPT

## 2022-03-08 PROCEDURE — 82043 UR ALBUMIN QUANTITATIVE: CPT

## 2022-03-08 PROCEDURE — 83036 HEMOGLOBIN GLYCOSYLATED A1C: CPT

## 2022-03-08 PROCEDURE — 84443 ASSAY THYROID STIM HORMONE: CPT

## 2022-03-08 PROCEDURE — 36415 COLL VENOUS BLD VENIPUNCTURE: CPT

## 2022-03-08 PROCEDURE — 80061 LIPID PANEL: CPT

## 2022-03-24 DIAGNOSIS — E11.9 TYPE 2 DIABETES MELLITUS WITHOUT COMPLICATION, WITHOUT LONG-TERM CURRENT USE OF INSULIN (H): ICD-10-CM

## 2022-03-25 RX ORDER — DULAGLUTIDE 0.75 MG/.5ML
INJECTION, SOLUTION SUBCUTANEOUS
Qty: 2 ML | Refills: 0 | Status: SHIPPED | OUTPATIENT
Start: 2022-03-25 | End: 2022-04-08

## 2022-03-25 NOTE — TELEPHONE ENCOUNTER
Prescription approved per [facility] Refill Protocol.  Jeremie Angeles RN  [facility] Triage Nurse

## 2022-04-08 ENCOUNTER — OFFICE VISIT (OUTPATIENT)
Dept: INTERNAL MEDICINE | Facility: CLINIC | Age: 52
End: 2022-04-08
Payer: COMMERCIAL

## 2022-04-08 VITALS
WEIGHT: 204.8 LBS | TEMPERATURE: 97 F | SYSTOLIC BLOOD PRESSURE: 118 MMHG | HEART RATE: 75 BPM | DIASTOLIC BLOOD PRESSURE: 78 MMHG | BODY MASS INDEX: 35.15 KG/M2 | OXYGEN SATURATION: 100 %

## 2022-04-08 DIAGNOSIS — I10 ESSENTIAL HYPERTENSION WITH GOAL BLOOD PRESSURE LESS THAN 130/80: ICD-10-CM

## 2022-04-08 DIAGNOSIS — Z12.31 ENCOUNTER FOR SCREENING MAMMOGRAM FOR BREAST CANCER: ICD-10-CM

## 2022-04-08 DIAGNOSIS — E66.01 SEVERE OBESITY WITH BODY MASS INDEX (BMI) OF 35.0 TO 39.9 WITH COMORBIDITY (H): ICD-10-CM

## 2022-04-08 DIAGNOSIS — E78.5 HYPERLIPIDEMIA LDL GOAL <100: ICD-10-CM

## 2022-04-08 DIAGNOSIS — F33.42 RECURRENT MAJOR DEPRESSIVE DISORDER, IN FULL REMISSION (H): ICD-10-CM

## 2022-04-08 DIAGNOSIS — E11.9 TYPE 2 DIABETES MELLITUS WITHOUT COMPLICATION, WITHOUT LONG-TERM CURRENT USE OF INSULIN (H): Primary | ICD-10-CM

## 2022-04-08 DIAGNOSIS — Z12.11 SPECIAL SCREENING FOR MALIGNANT NEOPLASMS, COLON: ICD-10-CM

## 2022-04-08 DIAGNOSIS — E03.9 HYPOTHYROIDISM, UNSPECIFIED TYPE: ICD-10-CM

## 2022-04-08 PROCEDURE — 91305 COVID-19,PF,PFIZER (12+ YRS): CPT | Performed by: INTERNAL MEDICINE

## 2022-04-08 PROCEDURE — 0054A COVID-19,PF,PFIZER (12+ YRS): CPT | Performed by: INTERNAL MEDICINE

## 2022-04-08 PROCEDURE — 99214 OFFICE O/P EST MOD 30 MIN: CPT | Mod: 25 | Performed by: INTERNAL MEDICINE

## 2022-04-08 PROCEDURE — 99207 PR FOOT EXAM NO CHARGE: CPT | Mod: 25 | Performed by: INTERNAL MEDICINE

## 2022-04-08 RX ORDER — ATORVASTATIN CALCIUM 20 MG/1
20 TABLET, FILM COATED ORAL DAILY
Qty: 30 TABLET | Refills: 11 | Status: SHIPPED | OUTPATIENT
Start: 2022-04-08 | End: 2023-02-10 | Stop reason: DRUGHIGH

## 2022-04-08 RX ORDER — DULOXETIN HYDROCHLORIDE 30 MG/1
CAPSULE, DELAYED RELEASE ORAL
Qty: 180 CAPSULE | Refills: 3 | Status: SHIPPED | OUTPATIENT
Start: 2022-04-08 | End: 2023-02-10

## 2022-04-08 RX ORDER — BLOOD SUGAR DIAGNOSTIC
STRIP MISCELLANEOUS
Qty: 200 STRIP | Refills: 3 | Status: SHIPPED | OUTPATIENT
Start: 2022-04-08 | End: 2024-02-01

## 2022-04-08 RX ORDER — DULAGLUTIDE 0.75 MG/.5ML
0.75 INJECTION, SOLUTION SUBCUTANEOUS
Qty: 6 ML | Refills: 3 | Status: SHIPPED | OUTPATIENT
Start: 2022-04-08 | End: 2023-02-10 | Stop reason: DRUGHIGH

## 2022-04-08 RX ORDER — LEVOTHYROXINE SODIUM 100 UG/1
100 TABLET ORAL DAILY
Qty: 90 TABLET | Refills: 3 | Status: SHIPPED | OUTPATIENT
Start: 2022-04-08 | End: 2023-02-10 | Stop reason: DRUGHIGH

## 2022-04-08 RX ORDER — LISINOPRIL 40 MG/1
TABLET ORAL
Qty: 90 TABLET | Refills: 3 | Status: SHIPPED | OUTPATIENT
Start: 2022-04-08 | End: 2023-02-10

## 2022-04-08 ASSESSMENT — PATIENT HEALTH QUESTIONNAIRE - PHQ9: SUM OF ALL RESPONSES TO PHQ QUESTIONS 1-9: 0

## 2022-04-08 NOTE — PATIENT INSTRUCTIONS
Start Atorvastatin 20mg tab, 1 tab daily for cholesterol and heart risk reduction  Continue other meds.  Prescriptions refilled.   (Atorvastatin at [facility] and other meds through mail order)   Schedule a future lab appointment  fasting in 1 month and nonfasting (A1C) in 6 months.   For fasting labs, please refrain from eating for 8 hours or more.   Drink 2 glasses of water before your lab appointment. It is fine to take your  oral medications on the morning of the lab test as usual   Appt with me  1 week after labs in 6 months to review diabetes and for pap/pelvic exam. Check blood sugars twice a day (before breakfast and bedtime) for 4 days prior to the appointment with me, write them down and have them available to review with the appointment   Schedule mammogram. This will be done at the [facility].   Vaccinations: Pfizer covid vaccine booster today.  Will give tetanus vaccination at next appointment  Screening colonoscopy  with  [facility]. Call  them at [phone] to schedule the procedure.   Schedule an eye exam appointment this Spring/summer at [facility]  Reduce calorie/carbohydrate (sugar, bread, potato, pasta, rice, alcohol etc)  intake in diet.  Increase color on your plate with fruits and vegetables. Increase  frequency of walking or other aerobic exercise as able (goal is daily)  Moisturizer as needed for dry skin of feet

## 2022-04-08 NOTE — PROGRESS NOTES
ASSESSMENT:   1. Type 2 diabetes mellitus without complication, without long-term current use of insulin (H)  Controlled.  Continue current medication.  Repeat labs 6 months  - FOOT EXAM  - dulaglutide (TRULICITY) 0.75 MG/0.5ML pen; Inject 0.75 mg Subcutaneous every 7 days  Dispense: 6 mL; Refill: 3  - metFORMIN (GLUCOPHAGE) 1000 MG tablet; TAKE 1 TABLET TWICE A DAY  WITH BREAKFAST AND DINNER  Dispense: 180 tablet; Refill: 3  - blood glucose (ONETOUCH VERIO IQ) test strip; Use to test blood sugars 2 times daily or as directed.  Dispense: 200 strip; Refill: 3  - Hemoglobin A1c; Future    2. Severe obesity with body mass index (BMI) of 35.0 to 39.9 with comorbidity (H)  Weight up 7 pounds.  Contributing to diabetes, hypertension, hyperlipidemia.  Counseled regarding calorie/carbohydrate reduction.  Continue Trulicity    3. Recurrent major depressive disorder, in full remission (H)   PHQ9 = 0.  Controlled.  Continue current medication  - DULoxetine (CYMBALTA) 30 MG capsule; TAKE 1 CAPSULE TWICE DAILY  Dispense: 180 capsule; Refill: 3    4. Essential hypertension with goal blood pressure less than 130/80  Controlled.  Continue current medication  - lisinopril (ZESTRIL) 40 MG tablet; TAKE 1 TABLET DAILY (DOSE  CHANGE)  Dispense: 90 tablet; Refill: 3     5. Hypothyroidism, unspecified type  Controlled.  Continue medication.  Repeat lab 1 year  - levothyroxine (SYNTHROID) 100 MCG tablet; Take 1 tablet (100 mcg) by mouth daily  Dispense: 90 tablet; Refill: 3  - TSH with free T4 reflex; Future    6. Hyperlipidemia LDL goal <100  Uncontrolled.  Start statin therapy and recheck labs 1 month  - atorvastatin (LIPITOR) 20 MG tablet; Take 1 tablet (20 mg) by mouth daily  Dispense: 30 tablet; Refill: 11  - CK total; Future  - Hepatic function panel; Future  - Lipid panel reflex to direct LDL Fasting; Future    7. Special screening for malignant neoplasms, colon  Due for colon cancer screening  - Adult Gastro Ref - Procedure Only;  Future    8. Encounter for screening mammogram for breast cancer  Due for breast cancer screening  - *MA Screening Digital Bilateral; Future        PLAN:   Start Atorvastatin 20mg tab, 1 tab daily for cholesterol and heart risk reduction  Continue other meds.  Prescriptions refilled.   (Atorvastatin at [facility] and other meds through mail order)   Schedule a future lab appointment  fasting in 1 month and nonfasting (A1C) in 6 months.   For fasting labs, please refrain from eating for 8 hours or more.   Drink 2 glasses of water before your lab appointment. It is fine to take your  oral medications on the morning of the lab test as usual   Appt with me  1 week after labs in 6 months to review diabetes and for pap/pelvic exam. Check blood sugars twice a day (before breakfast and bedtime) for 4 days prior to the appointment with me, write them down and have them available to review with the appointment   Schedule mammogram. This will be done at the [facility].   Vaccinations: Pfizer covid vaccine booster today.  Will give tetanus vaccination at next appointment  Screening colonoscopy  with  [facility]. Call  them at [phone] to schedule the procedure.   Schedule an eye exam appointment this Spring/summer at [facility]  Reduce calorie/carbohydrate (sugar, bread, potato, pasta, rice, alcohol etc)  intake in diet.  Increase color on your plate with fruits and vegetables. Increase  frequency of walking or other aerobic exercise as able (goal is daily)  Moisturizer as needed for dry skin of feet    (Chart documentation was completed, in part, with [employer] voice-recognition software. Even though reviewed, some grammatical, spelling, and word errors may remain.)    Pablo Gomez MD  Internal Medicine Department  [facility]      Claudine Hurst is a 51 year old who presents for the following health issues     History of Present Illness       Diabetes:   She  presents for follow up of diabetes.  She is checking home blood glucose one time daily. She checks blood glucose before meals.  Blood glucose is sometimes over 200 and never under 70. She is aware of hypoglycemia symptoms including weakness. She has no concerns regarding her diabetes at this time.  She is not experiencing numbness or burning in feet, excessive thirst, blurry vision, weight changes or redness, sores or blisters on feet. The patient has had a diabetic eye exam in the last 12 months.         She eats 0-1 servings of fruits and vegetables daily.She consumes 2 sweetened beverage(s) daily.She exercises with enough effort to increase her heart rate 30 to 60 minutes per day.  She exercises with enough effort to increase her heart rate 3 or less days per week. She is missing 1 dose(s) of medications per week.      Most recent lab results reviewed with pt.       Sugars AM and bedtime  range for both  Denies CP, SOB, abdominal pain, polyuria, polydipsia, vision changes, extremity numbness/parasthesias or skin problems.   Eye exam at [facility] in [place] 1 year ago. Will be having again  Later this Spring  Occ walking  Due for Pap smear.  Patient declines today but will do have at next appointment.  Due for COVID booster.  Will need future tetanus vaccine in addition     Component      Latest Ref Rng & Units 4/12/2021 3/8/2022   Sodium      133 - 144 mmol/L  137   Potassium      3.4 - 5.3 mmol/L  3.8   Chloride      94 - 109 mmol/L  106   Carbon Dioxide      20 - 32 mmol/L  23   Anion Gap      3 - 14 mmol/L  8   Urea Nitrogen      7 - 30 mg/dL  10   Creatinine      0.52 - 1.04 mg/dL  0.77   Calcium      8.5 - 10.1 mg/dL  8.7   Glucose      70 - 99 mg/dL  101 (H)   Alkaline Phosphatase      40 - 150 U/L  45   AST      0 - 45 U/L 34 19   ALT      0 - 50 U/L 72 (H) 34   Protein Total      6.8 - 8.8 g/dL 8.1 7.8   Albumin      3.4 - 5.0 g/dL 3.9 3.5   Bilirubin Total      0.2 - 1.3 mg/dL 0.5 0.5   GFR  "Estimate      >60 mL/min/1.73m2  >90   Bilirubin Direct      0.0 - 0.2 mg/dL 0.1    Alkaline Phosphatase      40 - 150 U/L 59    Cholesterol      <200 mg/dL 212 (H) 219 (H)   Triglycerides      <150 mg/dL 127 169 (H)   HDL Cholesterol      >=50 mg/dL 43 (L) 38 (L)   LDL Cholesterol Calculated      <=100 mg/dL 144 (H) 147 (H)   Non HDL Cholesterol      <130 mg/dL 169 (H) 181 (H)   Patient Fasting > 8hrs?        Yes   Creatinine Urine      mg/dL 278 139   Albumin Urine mg/L      mg/L 22 8   Albumin Urine mg/g Cr      0.00 - 25.00 mg/g Cr 7.73 5.76   TSH      0.40 - 4.00 mU/L 0.86 2.46   Hemoglobin A1C POCT      0 - 5.6 % 8.5 (H)    Hemoglobin A1C      0.0 - 5.6 %  6.6 (H)       Additional ROS:   Constitutional, HEENT, Cardiovascular, Pulmonary, GI and , Neuro, MSK and Psych review of systems/symptoms are otherwise negative or unchanged from previous, except as noted above.      OBJECTIVE:  /78   Pulse 75   Temp 97  F (36.1  C) (Temporal)   Wt 92.9 kg (204 lb 12.8 oz)   SpO2 100%   BMI 35.15 kg/m     Estimated body mass index is 35.15 kg/m  as calculated from the following:    Height as of 3/18/21: 1.626 m (5' 4\").    Weight as of this encounter: 92.9 kg (204 lb 12.8 oz).  Eye: PERRL, EOMI  HENT: ear canals and TM's normal and nose and mouth without ulcers or lesions   Neck: no adenopathy. Thyroid normal to palpation. No bruits  Pulm: Lungs clear to auscultation   CV: Regular rates and rhythm  GI: Soft, obese,  nontender, Normal active bowel sounds, No hepatosplenomegaly or masses palpable  Ext: Peripheral pulses intact. No edema. Mild 1st MTP bunion formation bilaterally (L>R)  Neuro: Normal strength and tone, sensory exam grossly normal   Skin:  Mild callus right medial 1st toe.           "

## 2022-04-11 ENCOUNTER — TELEPHONE (OUTPATIENT)
Dept: INTERNAL MEDICINE | Facility: CLINIC | Age: 52
End: 2022-04-11
Payer: COMMERCIAL

## 2022-04-11 NOTE — TELEPHONE ENCOUNTER
----- Message from Pablo Gomez MD sent at 4/9/2022  4:52 PM CDT -----  Regarding: eye exam report  Please call [facility] in [place] at [phone] to get copy of patient's last eye exam report faxed to us to be scanned into chart

## 2022-04-15 ENCOUNTER — TRANSFERRED RECORDS (OUTPATIENT)
Dept: HEALTH INFORMATION MANAGEMENT | Facility: CLINIC | Age: 52
End: 2022-04-15
Payer: COMMERCIAL

## 2022-04-15 LAB — RETINOPATHY: NEGATIVE

## 2022-04-18 ENCOUNTER — TELEPHONE (OUTPATIENT)
Dept: INTERNAL MEDICINE | Facility: CLINIC | Age: 52
End: 2022-04-18
Payer: COMMERCIAL

## 2022-04-18 NOTE — TELEPHONE ENCOUNTER
Received fax from [employer] for case number  DUR-AL: [id number] for this patient rejecting my prescription for Trulicity.  Reason for rejection states that patient must satisfy a step therapy requirement through previous medication use and requested I prescribe medication  In line with her step therapy plan which includes generic Glucophage/metformin or a generic sulfonylurea.  Patient is already taking metformin and blood sugars are not under control so she is meeting with the Step therapy requirement.  Apparently [facility] is not capable of seeing the patient is also being prescribed metformin and they are paying for that medication.  Therefore, please send a preauthorization to [employer].  Patient to receive Trulicity as prescribed since she is meeting step therapy requirements

## 2022-04-20 DIAGNOSIS — E11.9 TYPE 2 DIABETES MELLITUS WITHOUT COMPLICATION, WITHOUT LONG-TERM CURRENT USE OF INSULIN (H): ICD-10-CM

## 2022-04-21 ENCOUNTER — ANCILLARY PROCEDURE (OUTPATIENT)
Dept: MAMMOGRAPHY | Facility: CLINIC | Age: 52
End: 2022-04-21
Attending: INTERNAL MEDICINE
Payer: COMMERCIAL

## 2022-04-21 DIAGNOSIS — Z12.31 ENCOUNTER FOR SCREENING MAMMOGRAM FOR BREAST CANCER: ICD-10-CM

## 2022-04-21 PROCEDURE — 77067 SCR MAMMO BI INCL CAD: CPT | Mod: TC | Performed by: RADIOLOGY

## 2022-04-21 RX ORDER — DULAGLUTIDE 0.75 MG/.5ML
INJECTION, SOLUTION SUBCUTANEOUS
Qty: 2 ML | Refills: 0 | OUTPATIENT
Start: 2022-04-21

## 2022-04-21 NOTE — TELEPHONE ENCOUNTER
Prior Authorization Not Needed per Insurance    Medication: Trulicity  Insurance Company: [employer] - Phone [phone] Fax [phone]  Pharmacy Filling the Rx: [facility] - [place], [place] - [address] AT PORTAL TO REGISTERED [facility]  Pharmacy Notified:  Yes - Called pharmacy and relayed response back from insurance that pa is not needed, that it is a refill too soonand that if after calling their help desk number they are still having problems to call me back

## 2022-04-21 NOTE — TELEPHONE ENCOUNTER
PA Initiation    Medication: Trulicity  Insurance Company: [employer] - Phone [phone] Fax [phone]  Pharmacy Filling the Rx: [facility] - [place], [place] - [facility] [address] AT PORTAL TO REGISTERED [facility]  Filling Pharmacy Phone: [phone]  Filling Pharmacy Fax: [phone]  Start Date: 4/21/2022    Central Prior Authorization Team   Phone: [phone]

## 2022-04-21 NOTE — TELEPHONE ENCOUNTER
This refill request is a duplicate previously sent to pharmacy or currently in review.  Refused medication to pharmacy as duplicate.   Connie Hicks RN

## 2022-05-12 ENCOUNTER — LAB (OUTPATIENT)
Dept: LAB | Facility: CLINIC | Age: 52
End: 2022-05-12
Payer: COMMERCIAL

## 2022-05-12 DIAGNOSIS — E78.5 HYPERLIPIDEMIA LDL GOAL <100: ICD-10-CM

## 2022-05-12 DIAGNOSIS — Z11.4 SCREENING FOR HIV (HUMAN IMMUNODEFICIENCY VIRUS): Primary | ICD-10-CM

## 2022-05-12 LAB
ALBUMIN SERPL-MCNC: 3.8 G/DL (ref 3.4–5)
ALP SERPL-CCNC: 71 U/L (ref 40–150)
ALT SERPL W P-5'-P-CCNC: 49 U/L (ref 0–50)
AST SERPL W P-5'-P-CCNC: 22 U/L (ref 0–45)
BILIRUB DIRECT SERPL-MCNC: <0.1 MG/DL (ref 0–0.2)
BILIRUB SERPL-MCNC: 0.3 MG/DL (ref 0.2–1.3)
CHOLEST SERPL-MCNC: 151 MG/DL
CK SERPL-CCNC: 160 U/L (ref 30–225)
FASTING STATUS PATIENT QL REPORTED: YES
HDLC SERPL-MCNC: 34 MG/DL
HIV 1+2 AB+HIV1 P24 AG SERPL QL IA: NONREACTIVE
LDLC SERPL CALC-MCNC: 54 MG/DL
NONHDLC SERPL-MCNC: 117 MG/DL
PROT SERPL-MCNC: 8 G/DL (ref 6.8–8.8)
TRIGL SERPL-MCNC: 314 MG/DL

## 2022-05-12 PROCEDURE — 80076 HEPATIC FUNCTION PANEL: CPT

## 2022-05-12 PROCEDURE — 87389 HIV-1 AG W/HIV-1&-2 AB AG IA: CPT

## 2022-05-12 PROCEDURE — 82550 ASSAY OF CK (CPK): CPT

## 2022-05-12 PROCEDURE — 80061 LIPID PANEL: CPT

## 2022-05-12 PROCEDURE — 36415 COLL VENOUS BLD VENIPUNCTURE: CPT

## 2022-07-22 ENCOUNTER — TRANSFERRED RECORDS (OUTPATIENT)
Dept: HEALTH INFORMATION MANAGEMENT | Facility: CLINIC | Age: 52
End: 2022-07-22

## 2022-07-22 LAB — RETINOPATHY: NEGATIVE

## 2022-10-11 ENCOUNTER — LAB (OUTPATIENT)
Dept: LAB | Facility: CLINIC | Age: 52
End: 2022-10-11
Payer: COMMERCIAL

## 2022-10-11 DIAGNOSIS — E11.9 TYPE 2 DIABETES MELLITUS WITHOUT COMPLICATION, WITHOUT LONG-TERM CURRENT USE OF INSULIN (H): ICD-10-CM

## 2022-10-11 LAB — HBA1C MFR BLD: 8.2 % (ref 0–5.6)

## 2022-10-11 PROCEDURE — 36415 COLL VENOUS BLD VENIPUNCTURE: CPT

## 2022-10-11 PROCEDURE — 83036 HEMOGLOBIN GLYCOSYLATED A1C: CPT

## 2022-10-16 ENCOUNTER — HEALTH MAINTENANCE LETTER (OUTPATIENT)
Age: 52
End: 2022-10-16

## 2022-11-11 ENCOUNTER — OFFICE VISIT (OUTPATIENT)
Dept: INTERNAL MEDICINE | Facility: CLINIC | Age: 52
End: 2022-11-11
Payer: COMMERCIAL

## 2022-11-11 VITALS
DIASTOLIC BLOOD PRESSURE: 84 MMHG | OXYGEN SATURATION: 97 % | BODY MASS INDEX: 35.5 KG/M2 | TEMPERATURE: 97.6 F | WEIGHT: 206.8 LBS | HEART RATE: 64 BPM | SYSTOLIC BLOOD PRESSURE: 131 MMHG

## 2022-11-11 DIAGNOSIS — Z12.11 SPECIAL SCREENING FOR MALIGNANT NEOPLASMS, COLON: ICD-10-CM

## 2022-11-11 DIAGNOSIS — E66.01 SEVERE OBESITY WITH BODY MASS INDEX (BMI) OF 35.0 TO 39.9 WITH COMORBIDITY (H): ICD-10-CM

## 2022-11-11 DIAGNOSIS — E03.9 HYPOTHYROIDISM, UNSPECIFIED TYPE: ICD-10-CM

## 2022-11-11 DIAGNOSIS — I10 ESSENTIAL HYPERTENSION WITH GOAL BLOOD PRESSURE LESS THAN 130/80: ICD-10-CM

## 2022-11-11 DIAGNOSIS — E11.9 TYPE 2 DIABETES MELLITUS WITHOUT COMPLICATION, WITHOUT LONG-TERM CURRENT USE OF INSULIN (H): Primary | ICD-10-CM

## 2022-11-11 DIAGNOSIS — E78.5 HYPERLIPIDEMIA LDL GOAL <100: ICD-10-CM

## 2022-11-11 DIAGNOSIS — F32.5 MAJOR DEPRESSIVE DISORDER WITH SINGLE EPISODE, IN FULL REMISSION (H): ICD-10-CM

## 2022-11-11 DIAGNOSIS — Z23 FLU VACCINE NEED: ICD-10-CM

## 2022-11-11 DIAGNOSIS — Z23 HIGH PRIORITY FOR 2019-NCOV VACCINE: ICD-10-CM

## 2022-11-11 PROCEDURE — 0124A COVID-19,PF,PFIZER BOOSTER BIVALENT: CPT | Performed by: INTERNAL MEDICINE

## 2022-11-11 PROCEDURE — 96127 BRIEF EMOTIONAL/BEHAV ASSMT: CPT | Performed by: INTERNAL MEDICINE

## 2022-11-11 PROCEDURE — 90682 RIV4 VACC RECOMBINANT DNA IM: CPT | Performed by: INTERNAL MEDICINE

## 2022-11-11 PROCEDURE — 99214 OFFICE O/P EST MOD 30 MIN: CPT | Mod: 25 | Performed by: INTERNAL MEDICINE

## 2022-11-11 PROCEDURE — 91312 COVID-19,PF,PFIZER BOOSTER BIVALENT: CPT | Performed by: INTERNAL MEDICINE

## 2022-11-11 PROCEDURE — 90471 IMMUNIZATION ADMIN: CPT | Performed by: INTERNAL MEDICINE

## 2022-11-11 RX ORDER — DULAGLUTIDE 1.5 MG/.5ML
1.5 INJECTION, SOLUTION SUBCUTANEOUS
Qty: 6 ML | Refills: 3 | Status: SHIPPED | OUTPATIENT
Start: 2022-11-11 | End: 2023-02-10 | Stop reason: DRUGHIGH

## 2022-11-11 ASSESSMENT — PATIENT HEALTH QUESTIONNAIRE - PHQ9
SUM OF ALL RESPONSES TO PHQ QUESTIONS 1-9: 0
SUM OF ALL RESPONSES TO PHQ QUESTIONS 1-9: 0

## 2022-11-11 NOTE — PATIENT INSTRUCTIONS
Increase Trulicity to 1.5mg injection once a week  Continue other medications  Call  [phone] or use [employer] to schedule a future lab appointment  fasting in 3 months.   For fasting labs, please refrain from eating for 8 hours or more.   Drink 2 glasses of water before your lab appointment. It is fine to take your  oral medications on the morning of the lab test as usual  Schedule a follow up appointment with me in clinic a few days after these future labs are drawn to review results and  Pap/pelvic exam. Check blood sugars twice a day (before breakfast and bedtime) for 4 days prior to the appointment with me, write them down and have them available to review with the appointment   Reduce calorie/carbohydrate (sugar, bread, potato, pasta, rice, alcohol, soda pop etc)  intake in diet.  Increase color on your plate with vegetables. Increase  frequency of walking or other aerobic exercise as able (goal is daily)  Vaccinations: Influenza vaccine and Pfizer covid vaccine  Screening colonoscopy  with  [facility]. Call  them at [phone] to schedule the procedure.   Will address tetanus and Prevnar -20 vaccines at future appt  
No

## 2022-11-11 NOTE — PROGRESS NOTES
ASSESSMENT:    1. Type 2 diabetes mellitus without complication, without long-term current use of insulin (H)  Uncontrolled overall.  Blood sugars doing well however last couple weeks with significant diet improvement.  Increase Trulicity to 1.5 mg once a week.  Continue improve diet and exercise and recheck labs 3 months  - dulaglutide (TRULICITY) 1.5 MG/0.5ML pen; Inject 1.5 mg Subcutaneous every 7 days  Dispense: 6 mL; Refill: 3  - Hemoglobin A1c; Future  - Albumin Random Urine Quantitative with Creat Ratio; Future  - Comprehensive metabolic panel; Future    2. Severe obesity with body mass index (BMI) of 35.0 to 39.9 with comorbidity (H)  Weight up 8 pounds from last visit.  Secondary to poor diet.  Increase Trulicity and continue diet improvements from last couple weeks.  Will recheck weight in 3 months  - dulaglutide (TRULICITY) 1.5 MG/0.5ML pen; Inject 1.5 mg Subcutaneous every 7 days  Dispense: 6 mL; Refill: 3    3. Major depressive disorder with single episode, in full remission (H)  Controlled. PHQ9 = 0.  Continue current medication    4. Hypothyroidism, unspecified type  On levothyroxine.  Previous TSH at goal.  Weight increased.  Recheck lab 3 months  - TSH with free T4 reflex; Future    5. Essential hypertension with goal blood pressure less than 130/80  Blood pressure minimally above goal.  Continue current medication and will work on diet/exercise.  Recheck blood pressure in 3 months    6. Hyperlipidemia LDL goal <100  On statin.  Previous lipids at goal.  Continue current medication and recheck lab 3 months  - Lipid panel reflex to direct LDL Fasting; Future  - Comprehensive metabolic panel; Future    7. Flu vaccine need  Candidate for flu vaccination  - INFLUENZA QUAD, RECOMBINANT, P-FREE (RIV4) (FLUBLOK) AGE 50-64 [PAR068]    8. High priority for 2019-nCoV vaccine  Candidate for COVID booster vaccination  - COVID-19,PF,PFIZER BOOSTER BIVALENT 12+Yrs    9. Special screening for malignant  neoplasms, colon  Candidate for colon cancer screening.  Asymptomatic  - Colonoscopy Screening  Referral; Future      PLAN:  Increase Trulicity to 1.5mg injection once a week  Continue other medications  Call  [phone] or use [employer] to schedule a future lab appointment  fasting in 3 months.   For fasting labs, please refrain from eating for 8 hours or more.   Drink 2 glasses of water before your lab appointment. It is fine to take your  oral medications on the morning of the lab test as usual  Schedule a follow up appointment with me in clinic a few days after these future labs are drawn to review results and  Pap/pelvic exam. Check blood sugars twice a day (before breakfast and bedtime) for 4 days prior to the appointment with me, write them down and have them available to review with the appointment   Reduce calorie/carbohydrate (sugar, bread, potato, pasta, rice, alcohol, soda pop etc)  intake in diet.  Increase color on your plate with vegetables. Increase  frequency of walking or other aerobic exercise as able (goal is daily)  Vaccinations: Influenza vaccine and Pfizer covid vaccine  Screening colonoscopy  with  [facility]. Call  them at [phone] to schedule the procedure.   Will address tetanus and Prevnar-20 vaccines at future appt        (Chart documentation was completed, in part, with [employer] voice-recognition software. Even though reviewed, some grammatical, spelling, and word errors may remain.)    Pablo Gomez MD  Internal Medicine Department  [facility]      Claudine Hurst is a 52 year old, presenting for the following health issues:  Diabetes      History of Present Illness       Diabetes:   She presents for follow up of diabetes.  She is checking home blood glucose one time daily. She checks blood glucose before meals.  Blood glucose is sometimes over 200 and never under 70. She is aware of hypoglycemia symptoms including dizziness.  She has no concerns regarding her diabetes at this time.  She is not experiencing numbness or burning in feet, excessive thirst, blurry vision, weight changes or redness, sores or blisters on feet. The patient has had a diabetic eye exam in the last 12 months. Eye exam performed on 08/2022. Location of last eye exam [facility].        She eats 0-1 servings of fruits and vegetables daily.She consumes 2 sweetened beverage(s) daily.She exercises with enough effort to increase her heart rate 20 to 29 minutes per day.  She exercises with enough effort to increase her heart rate 3 or less days per week. She is missing 1 dose(s) of medications per week.  She is not taking prescribed medications regularly due to remembering to take.    Today's PHQ-9         PHQ-9 Total Score: 0    PHQ-9 Q9 Thoughts of better off dead/self-harm past 2 weeks :   Not at all            Most recent lab results reviewed with pt.      Component      Latest Ref Rng & Units 4/12/2021 3/8/2022 5/12/2022 10/11/2022   Sodium      133 - 144 mmol/L  137     Potassium      3.4 - 5.3 mmol/L  3.8     Chloride      94 - 109 mmol/L  106     Carbon Dioxide      20 - 32 mmol/L  23     Anion Gap      3 - 14 mmol/L  8     Urea Nitrogen      7 - 30 mg/dL  10     Creatinine      0.52 - 1.04 mg/dL  0.77     Calcium      8.5 - 10.1 mg/dL  8.7     Glucose      70 - 99 mg/dL  101 (H)     Alkaline Phosphatase      40 - 150 U/L  45 71    AST      0 - 45 U/L  19 22    ALT      0 - 50 U/L  34 49    Protein Total      6.8 - 8.8 g/dL  7.8 8.0    Albumin      3.4 - 5.0 g/dL  3.5 3.8    Bilirubin Total      0.2 - 1.3 mg/dL  0.5 0.3    GFR Estimate      >60 mL/min/1.73m2  >90     Bilirubin Direct      0.0 - 0.2 mg/dL   <0.1    Cholesterol      <200 mg/dL  219 (H) 151    Triglycerides      <150 mg/dL  169 (H) 314 (H)    HDL Cholesterol      >=50 mg/dL  38 (L) 34 (L)    LDL Cholesterol Calculated      <=100 mg/dL  147 (H) 54    Non HDL Cholesterol      <130 mg/dL   "181 (H) 117    Patient Fasting > 8hrs?        Yes Yes    Creatinine Urine      mg/dL  139     Albumin Urine mg/L      mg/L  8     Albumin Urine mg/g Cr      0.00 - 25.00 mg/g Cr  5.76     Hemoglobin A1C      0.0 - 5.6 % 8.5 (H) 6.6 (H)  8.2 (H)   TSH      0.40 - 4.00 mU/L  2.46     CK Total      30 - 225 U/L   160      Weight up 9 pounds from last visit  Denies CP, SOB, abdominal pain, polyuria, polydipsia, vision changes, extremity numbness/parasthesias or skin problems.  Mood good.  PHQ-9 = 0   Had been drinking 2-3 cans of Mountain Dew prior to 2-3 weeks ago. Made big diet changes 2 weeks ago.  Since then, blood sugars range 100-115 through the day    Additional ROS:   Constitutional, HEENT, Cardiovascular, Pulmonary, GI and , Neuro, MSK and Psych review of systems/symptoms are otherwise negative or unchanged from previous, except as noted above.      OBJECTIVE:  /84   Pulse 64   Temp 97.6  F (36.4  C) (Temporal)   Wt 93.8 kg (206 lb 12.8 oz)   LMP 10/28/2022 (Exact Date)   SpO2 97%   BMI 35.50 kg/m     Estimated body mass index is 35.5 kg/m  as calculated from the following:    Height as of 3/18/21: 1.626 m (5' 4\").    Weight as of this encounter: 93.8 kg (206 lb 12.8 oz).     Neck: no adenopathy. Thyroid normal to palpation. No bruits  Pulm: Lungs clear to auscultation   CV: Regular rates and rhythm  GI: Soft, obese, nontender, Normal active bowel sounds, No hepatosplenomegaly or masses palpable  Ext: Peripheral pulses intact. No edema.  Neuro: Normal strength and tone, sensory exam grossly normal  Gen: Normal-appearing affect       "

## 2023-02-08 ENCOUNTER — LAB (OUTPATIENT)
Dept: LAB | Facility: CLINIC | Age: 53
End: 2023-02-08
Payer: COMMERCIAL

## 2023-02-08 DIAGNOSIS — E78.5 HYPERLIPIDEMIA LDL GOAL <100: ICD-10-CM

## 2023-02-08 DIAGNOSIS — E11.9 TYPE 2 DIABETES MELLITUS WITHOUT COMPLICATION, WITHOUT LONG-TERM CURRENT USE OF INSULIN (H): ICD-10-CM

## 2023-02-08 DIAGNOSIS — E03.9 HYPOTHYROIDISM, UNSPECIFIED TYPE: ICD-10-CM

## 2023-02-08 LAB
ALBUMIN SERPL BCG-MCNC: 4.3 G/DL (ref 3.5–5.2)
ALP SERPL-CCNC: 57 U/L (ref 35–104)
ALT SERPL W P-5'-P-CCNC: 26 U/L (ref 10–35)
ANION GAP SERPL CALCULATED.3IONS-SCNC: 10 MMOL/L (ref 7–15)
AST SERPL W P-5'-P-CCNC: 23 U/L (ref 10–35)
BILIRUB SERPL-MCNC: 0.4 MG/DL
BUN SERPL-MCNC: 13.6 MG/DL (ref 6–20)
CALCIUM SERPL-MCNC: 9.5 MG/DL (ref 8.6–10)
CHLORIDE SERPL-SCNC: 103 MMOL/L (ref 98–107)
CHOLEST SERPL-MCNC: 173 MG/DL
CREAT SERPL-MCNC: 0.79 MG/DL (ref 0.51–0.95)
CREAT UR-MCNC: 171 MG/DL
DEPRECATED HCO3 PLAS-SCNC: 24 MMOL/L (ref 22–29)
GFR SERPL CREATININE-BSD FRML MDRD: >90 ML/MIN/1.73M2
GLUCOSE SERPL-MCNC: 144 MG/DL (ref 70–99)
HBA1C MFR BLD: 7.3 % (ref 0–5.6)
HDLC SERPL-MCNC: 42 MG/DL
LDLC SERPL CALC-MCNC: 107 MG/DL
MICROALBUMIN UR-MCNC: <12 MG/L
MICROALBUMIN/CREAT UR: NORMAL MG/G{CREAT}
NONHDLC SERPL-MCNC: 131 MG/DL
POTASSIUM SERPL-SCNC: 4.3 MMOL/L (ref 3.4–5.3)
PROT SERPL-MCNC: 7.5 G/DL (ref 6.4–8.3)
SODIUM SERPL-SCNC: 137 MMOL/L (ref 136–145)
T4 FREE SERPL-MCNC: 0.95 NG/DL (ref 0.9–1.7)
TRIGL SERPL-MCNC: 121 MG/DL
TSH SERPL DL<=0.005 MIU/L-ACNC: 5.56 UIU/ML (ref 0.3–4.2)

## 2023-02-08 PROCEDURE — 84439 ASSAY OF FREE THYROXINE: CPT

## 2023-02-08 PROCEDURE — 82043 UR ALBUMIN QUANTITATIVE: CPT

## 2023-02-08 PROCEDURE — 83036 HEMOGLOBIN GLYCOSYLATED A1C: CPT

## 2023-02-08 PROCEDURE — 80053 COMPREHEN METABOLIC PANEL: CPT

## 2023-02-08 PROCEDURE — 80061 LIPID PANEL: CPT

## 2023-02-08 PROCEDURE — 84443 ASSAY THYROID STIM HORMONE: CPT

## 2023-02-08 PROCEDURE — 82570 ASSAY OF URINE CREATININE: CPT

## 2023-02-08 PROCEDURE — 36415 COLL VENOUS BLD VENIPUNCTURE: CPT

## 2023-02-10 ENCOUNTER — OFFICE VISIT (OUTPATIENT)
Dept: INTERNAL MEDICINE | Facility: CLINIC | Age: 53
End: 2023-02-10
Payer: COMMERCIAL

## 2023-02-10 VITALS
WEIGHT: 207.6 LBS | HEART RATE: 82 BPM | HEIGHT: 65 IN | BODY MASS INDEX: 34.59 KG/M2 | OXYGEN SATURATION: 98 % | DIASTOLIC BLOOD PRESSURE: 74 MMHG | SYSTOLIC BLOOD PRESSURE: 120 MMHG | TEMPERATURE: 97.7 F

## 2023-02-10 DIAGNOSIS — Z12.4 CERVICAL CANCER SCREENING: ICD-10-CM

## 2023-02-10 DIAGNOSIS — E11.9 TYPE 2 DIABETES MELLITUS WITHOUT COMPLICATION, WITHOUT LONG-TERM CURRENT USE OF INSULIN (H): ICD-10-CM

## 2023-02-10 DIAGNOSIS — Z23 NEED FOR PROPHYLACTIC VACCINATION WITH TETANUS-DIPHTHERIA (TD): ICD-10-CM

## 2023-02-10 DIAGNOSIS — F33.42 RECURRENT MAJOR DEPRESSIVE DISORDER, IN FULL REMISSION (H): ICD-10-CM

## 2023-02-10 DIAGNOSIS — E66.01 SEVERE OBESITY WITH BODY MASS INDEX (BMI) OF 35.0 TO 39.9 WITH COMORBIDITY (H): ICD-10-CM

## 2023-02-10 DIAGNOSIS — G56.03 BILATERAL CARPAL TUNNEL SYNDROME: ICD-10-CM

## 2023-02-10 DIAGNOSIS — E78.5 HYPERLIPIDEMIA LDL GOAL <100: ICD-10-CM

## 2023-02-10 DIAGNOSIS — E03.9 HYPOTHYROIDISM, UNSPECIFIED TYPE: ICD-10-CM

## 2023-02-10 DIAGNOSIS — Z12.31 ENCOUNTER FOR SCREENING MAMMOGRAM FOR BREAST CANCER: ICD-10-CM

## 2023-02-10 DIAGNOSIS — Z12.11 SPECIAL SCREENING FOR MALIGNANT NEOPLASMS, COLON: ICD-10-CM

## 2023-02-10 DIAGNOSIS — I10 ESSENTIAL HYPERTENSION WITH GOAL BLOOD PRESSURE LESS THAN 130/80: ICD-10-CM

## 2023-02-10 DIAGNOSIS — Z00.00 ROUTINE GENERAL MEDICAL EXAMINATION AT A HEALTH CARE FACILITY: Primary | ICD-10-CM

## 2023-02-10 PROCEDURE — 87624 HPV HI-RISK TYP POOLED RSLT: CPT | Performed by: INTERNAL MEDICINE

## 2023-02-10 PROCEDURE — 99214 OFFICE O/P EST MOD 30 MIN: CPT | Mod: 25 | Performed by: INTERNAL MEDICINE

## 2023-02-10 PROCEDURE — 99396 PREV VISIT EST AGE 40-64: CPT | Performed by: INTERNAL MEDICINE

## 2023-02-10 PROCEDURE — G0145 SCR C/V CYTO,THINLAYER,RESCR: HCPCS | Performed by: INTERNAL MEDICINE

## 2023-02-10 RX ORDER — DULOXETIN HYDROCHLORIDE 30 MG/1
CAPSULE, DELAYED RELEASE ORAL
Qty: 180 CAPSULE | Refills: 3 | Status: SHIPPED | OUTPATIENT
Start: 2023-02-10 | End: 2024-02-16

## 2023-02-10 RX ORDER — ATORVASTATIN CALCIUM 40 MG/1
40 TABLET, FILM COATED ORAL DAILY
Qty: 90 TABLET | Refills: 3 | Status: SHIPPED | OUTPATIENT
Start: 2023-02-10 | End: 2024-02-16

## 2023-02-10 RX ORDER — LISINOPRIL 40 MG/1
TABLET ORAL
Qty: 90 TABLET | Refills: 3 | Status: SHIPPED | OUTPATIENT
Start: 2023-02-10 | End: 2024-02-16

## 2023-02-10 RX ORDER — DULAGLUTIDE 3 MG/.5ML
3 INJECTION, SOLUTION SUBCUTANEOUS WEEKLY
Qty: 6 ML | Refills: 3 | Status: SHIPPED | OUTPATIENT
Start: 2023-02-10 | End: 2024-02-16

## 2023-02-10 RX ORDER — LEVOTHYROXINE SODIUM 112 UG/1
112 TABLET ORAL DAILY
Qty: 90 TABLET | Refills: 3 | Status: SHIPPED | OUTPATIENT
Start: 2023-02-10 | End: 2024-02-16

## 2023-02-10 ASSESSMENT — PAIN SCALES - GENERAL: PAINLEVEL: NO PAIN (0)

## 2023-02-10 NOTE — PATIENT INSTRUCTIONS
Pap smear obtained.  Will await results.  Possible blood contamination   Td tetanus vaccine. Get at pharmacy  Prevnar 20 pneumonia prevention vaccination at pharmacy in the next 1 to 2 months  Check on Shingrix coverage through pharmacy or insurance  Mammogram 4/22/2023 or later  Colonoscopy next week as scheduled with [facility]  Increase Trulicity to 3 mg injection once a week for diabetes control  Increase atorvastatin to 20 mg tablet, 2 tablets (total 40 mg) daily until you run out of the 20 mg tablets.  Then changed to 40 mg tablet, 1 tablet daily for cholesterol management  Increase levothyroxine to 112 mcg tablet, 1 tablet daily for thyroid function  Continue other medications.  Refills done  Eye exam in August  Call  [phone] or use [employer] to schedule a future lab appointment  fasting in 6 weeks for follow-up thyroid and cholesterol.  Repeat nonfasting A1c lab for diabetes in early June.   For fasting labs, please refrain from eating for 8 hours or more.   Drink 2 glasses of water before your lab appointment. It is fine to take your  oral medications on the morning of the lab test as usual  Reduce calorie/carbohydrate (sugar, bread, potato, pasta, rice, alcohol etc)  intake in diet.  Increase color on your plate with vegetables. Increase  frequency of walking or other aerobic exercise as able (goal is daily)  Referral to [facility] re: carpal tunnel syndrome to discuss surgical treatment options. A representative will call you within 2 business days to help you schedule your appointment, or you may contact the [facility] Representative at: [phone].  Pt was informed regarding extra E&M billing for management of new or established medical issues not related to today's wellness/screening visit

## 2023-02-10 NOTE — PROGRESS NOTES
ASSESSMENT:   1. Routine general medical examination at a health care facility  See plan discussion below for HCM discussion    2. Type 2 diabetes mellitus without complication, without long-term current use of insulin (H)   Improved but needs further lowering of A1C/blood sugars. Increase Trulicity to 3mg per week and continue metformin. Will improve diet and exercise and repeat A1C 3 mos. Eye exam August  - metFORMIN (GLUCOPHAGE) 1000 MG tablet; TAKE 1 TABLET TWICE A DAY  WITH BREAKFAST AND DINNER  Dispense: 180 tablet; Refill: 3  - Dulaglutide (TRULICITY) 3 MG/0.5ML SOPN; Inject 3 mg Subcutaneous once a week  Dispense: 6 mL; Refill: 3  - Hemoglobin A1c; Future    3. Recurrent major depressive disorder, in full remission (H)  Controlled with med  - DULoxetine (CYMBALTA) 30 MG capsule; TAKE 1 CAPSULE TWICE DAILY  Dispense: 180 capsule; Refill: 3    4. Severe obesity with body mass index (BMI) of 35.0 to 39.9 with comorbidity (H)  Contributing to DM, HTN, lipids. Will increase thyroid med and Trulicity. Encouraged improved diet and exercise    5. Essential hypertension with goal blood pressure less than 130/80  Controlled with med  - lisinopril (ZESTRIL) 40 MG tablet; TAKE 1 TABLET DAILY (DOSE  CHANGE)  Dispense: 90 tablet; Refill: 3    6. Hypothyroidism, unspecified type   Too low. Will increase levothyroxine and repeat TSH 6 weeks  - levothyroxine (SYNTHROID/LEVOTHROID) 112 MCG tablet; Take 1 tablet (112 mcg) by mouth daily  Dispense: 90 tablet; Refill: 3  - TSH with free T4 reflex; Future    7. Hyperlipidemia LDL goal <100   Uncontrolled. Increase Lipitor to 40mg daily and repeat lab 6 weeks  - atorvastatin (LIPITOR) 40 MG tablet; Take 1 tablet (40 mg) by mouth daily  Dispense: 90 tablet; Refill: 3  - Lipid panel reflex to direct LDL Fasting; Future  - CK total; Future  - Hepatic function panel; Future    8. Bilateral carpal tunnel syndrome  Sx bothersome. Previous positive EMG. Has failed conservative  treatment. Will refer to hand surgeon  - Orthopedic  Referral; Future    9. Need for prophylactic vaccination with tetanus-diphtheria (Td)  Will get at pharmacy (pt left before being given today)  - TD PRESERV FREE, IM (7+ YRS) (DECAVAC/TENIVAC)    10. Encounter for screening mammogram for breast cancer  No sx  - *MA Screening Digital Bilateral; Future    11. Cervical cancer screening  Slight friable superior cervix just outside the os. Await pap. If abnormal or inconclusive with some blood present in sample, will refer to GYN for further eval  - Pap Screen with HPV - recommended age 30 - 65 years    12. Special screening for malignant neoplasms, colon   Has colonoscopy scheduled with [facility] for next week per pt for screening      PLAN:   Pap smear obtained.  Will await results.  Possible blood contamination  Td tetanus vaccine. Get at pharmacy  Prevnar 20 pneumonia prevention vaccination at pharmacy in the next 1 to 2 months  Check on Shingrix coverage through pharmacy or insurance  Mammogram 4/22/2023 or later  Colonoscopy next week as scheduled with [facility]  Increase Trulicity to 3 mg injection once a week for diabetes control  Increase atorvastatin to 20 mg tablet, 2 tablets (total 40 mg) daily until you run out of the 20 mg tablets.  Then changed to 40 mg tablet, 1 tablet daily for cholesterol management  Increase levothyroxine to 112 mcg tablet, 1 tablet daily for thyroid function  Continue other medications.  Refills done  Eye exam in August  Call  [phone] or use CLARED to schedule a future lab appointment  fasting in 6 weeks for follow-up thyroid and cholesterol.  Repeat nonfasting A1c lab for diabetes in early June.   For fasting labs, please refrain from eating for 8 hours or more.   Drink 2 glasses of water before your lab appointment. It is fine to take your  oral medications on the morning of the lab test as usual  Reduce calorie/carbohydrate (sugar, bread, potato, pasta, rice, alcohol etc)   intake in diet.  Increase color on your plate with vegetables. Increase  frequency of walking or other aerobic exercise as able (goal is daily)  Referral to [place] [facility] re: carpal tunnel syndrome to discuss surgical treatment options. A representative will call you within 2 business days to help you schedule your appointment, or you may contact the  Representative at: [phone].  Pt was informed regarding extra E&M billing for management of new or established medical issues not related to today's wellness/screening visit      (Chart documentation was completed, in part, with [employer] voice-recognition software. Even though reviewed, some grammatical, spelling, and word errors may remain.)    Pablo Gomez MD  Internal Medicine Department  [facility]      Subjective     Chief Complaint:   Natali Nicholson is a 52 year old female who presents for a preventative physical examination and follow-up diabetes, lipids, HTN, hypothyhroidism, depression and other issues as below    Past Medical History:   Diagnosis Date     ASCUS on Pap smear 11/09    + HPV HR     Brachial neuritis or radiculitis NOS 9/03    LUE C7 level     CTS (carpal tunnel syndrome) 10/3/2011     History of colposcopy with cervical biopsy 10/22/09,10/16/12    neg,neg     HTN (hypertension)     no cardioloigist     Hyperlipidemia LDL goal <100       Hypothyroid      Lumbago 10/07    Mild degenerative disc disease at L5-S1     Mild major depression (H) 4/13/2018     Obesity      Papanicolaou smear of cervix with low grade squamous intraepithelial lesion (LGSIL) 10/18/10     Recurrent major depression (H)      Type 2 diabetes mellitus without complication  (goal A1C<7) 10/24/2015       Past Surgical History:   Procedure Laterality Date     DISCECTOMY, FUSION CERVICAL ANTERIOR TWO LEVELS, COMBINED N/A 9/20/2017    Procedure: COMBINED DISCECTOMY, FUSION CERVICAL ANTERIOR TWO LEVELS;  C4-6 Anterior  Cervical Discectomy and Fusion ;  Surgeon: Bharathi Salgado MD;  Location:  OR     [facility] NONSPECIFIC PROCEDURE  10/99    wisdom teeth extraction       Current Outpatient Medications   Medication     atorvastatin (LIPITOR) 40 MG tablet     Dulaglutide (TRULICITY) 3 MG/0.5ML SOPN     DULoxetine (CYMBALTA) 30 MG capsule     levothyroxine (SYNTHROID/LEVOTHROID) 112 MCG tablet     lisinopril (ZESTRIL) 40 MG tablet     metFORMIN (GLUCOPHAGE) 1000 MG tablet     blood glucose (ONETOUCH VERIO IQ) test strip     blood glucose monitoring (ONE TOUCH DELICA) lancets     blood glucose monitoring (ONETOUCH VERIO) meter device kit     thin (NO BRAND SPECIFIED) lancets     No current facility-administered medications for this visit.       Allergies as of 02/10/2023     (No Known Allergies)       Social History     Tobacco Use     Smoking status: Never     Smokeless tobacco: Never   Substance Use Topics     Alcohol use: No       Family History   Problem Relation Age of Onset     Heart Disease Mother          age 48     Diabetes Mother      Diabetes Father      Psychotic Disorder Father         Schizophrenia     Allergies Brother      Cancer Maternal Grandmother         Cervical cancer     Depression Maternal Aunt          She eats 0-1 servings of fruits and vegetables daily.She consumes 2 sweetened beverage(s) daily.She exercises with enough effort to increase her heart rate 9 or less minutes per day.  She exercises with enough effort to increase her heart rate 3 or less days per week. She is missing 1 dose(s) of medications per week.  She is not taking prescribed medications regularly due to remembering to take.  Healthy Habits:    Getting at least 3 servings of Calcium per day:  Yes    Bi-annual eye exam:  Yes    Dental care twice a year:  NO    Sleep apnea or symptoms of sleep apnea:  None    Diet:  Regular (no restrictions)    Frequency of exercise:  None    Duration of exercise:  N/A    Taking medications  regularly:  1    Barriers to taking medications:  Remembering to take    Medication side effects:  None    PHQ-2 Total Score:    Additional concerns today:  No           Most recent lab results reviewed with pt.      Component      Latest Ref Rng & Units 10/11/2022 2/8/2023   Sodium      136 - 145 mmol/L  137   Potassium      3.4 - 5.3 mmol/L  4.3   Chloride      98 - 107 mmol/L  103   Carbon Dioxide (CO2)      22 - 29 mmol/L  24   Anion Gap      7 - 15 mmol/L  10   Urea Nitrogen      6.0 - 20.0 mg/dL  13.6   Creatinine      0.51 - 0.95 mg/dL  0.79   Calcium      8.6 - 10.0 mg/dL  9.5   Glucose      70 - 99 mg/dL  144 (H)   Alkaline Phosphatase      35 - 104 U/L  57   AST      10 - 35 U/L  23   ALT      10 - 35 U/L  26   Protein Total      6.4 - 8.3 g/dL  7.5   Albumin      3.5 - 5.2 g/dL  4.3   Bilirubin Total      <=1.2 mg/dL  0.4   GFR Estimate      >60 mL/min/1.73m2  >90   Cholesterol      <200 mg/dL  173   Triglycerides      <150 mg/dL  121   HDL Cholesterol      >=50 mg/dL  42 (L)   LDL Cholesterol Calculated      <=100 mg/dL  107 (H)   Non HDL Cholesterol      <130 mg/dL  131 (H)   Creatinine Urine      mg/dL  171.0   Albumin Urine mg/L      mg/L  <12.0   Albumin Urine mg/g Cr           Hemoglobin A1C      0.0 - 5.6 % 8.2 (H) 7.3 (H)   TSH      0.30 - 4.20 uIU/mL  5.56 (H)   T4 Free      0.90 - 1.70 ng/dL  0.95        On Duloxetine. PHQ9 Nov 2022 = 0. Mood remains good per pt  Weight up 3 pounds in 1 year  Denies CP, SOB, abdominal pain, polyuria, polydipsia, vision changes, extremity numbness/parasthesias or skin problems.  Not checking sugars    Review of Systems:  CONSTITUTIONAL: NEGATIVE for fever, chills.  Weight up 3 pouunds  INTEGUMENTARY/SKIN: NEGATIVE for worrisome rashes, moles or lesions  EYES: NEGATIVE for vision changes or irritation. Eye exam July 2022  ENT/MOUTH: NEGATIVE for ear, mouth and throat problems  RESP: NEGATIVE for significant cough or SOB  BREAST: NEGATIVE for masses, tenderness or  discharge  CV: NEGATIVE for chest pain, palpitations or peripheral edema  GI: NEGATIVE for nausea, abdominal pain, heartburn, or change in bowel habits  : NEGATIVE for frequency, dysuria, or hematuria  MUSCULOSKELETAL: NEGATIVE for significant arthralgias or myalgia  NEURO: NEGATIVE for weakness, dizziness. Right side CTS in hand despite wrist brace. EMG positive bilateral. R  >> L  ENDOCRINE: NEGATIVE for temperature intolerance, skin/hair changes. See DM, llipid and thyroid  Above. Not checking sugars  HEME: NEGATIVE for bleeding problems  PSYCHIATRIC: NEGATIVE for changes in mood or affect with meds. See above      Component      Latest Ref Rng & Units 10/11/2022 2/8/2023   Sodium      136 - 145 mmol/L  137   Potassium      3.4 - 5.3 mmol/L  4.3   Chloride      98 - 107 mmol/L  103   Carbon Dioxide (CO2)      22 - 29 mmol/L  24   Anion Gap      7 - 15 mmol/L  10   Urea Nitrogen      6.0 - 20.0 mg/dL  13.6   Creatinine      0.51 - 0.95 mg/dL  0.79   Calcium      8.6 - 10.0 mg/dL  9.5   Glucose      70 - 99 mg/dL  144 (H)   Alkaline Phosphatase      35 - 104 U/L  57   AST      10 - 35 U/L  23   ALT      10 - 35 U/L  26   Protein Total      6.4 - 8.3 g/dL  7.5   Albumin      3.5 - 5.2 g/dL  4.3   Bilirubin Total      <=1.2 mg/dL  0.4   GFR Estimate      >60 mL/min/1.73m2  >90   Cholesterol      <200 mg/dL  173   Triglycerides      <150 mg/dL  121   HDL Cholesterol      >=50 mg/dL  42 (L)   LDL Cholesterol Calculated      <=100 mg/dL  107 (H)   Non HDL Cholesterol      <130 mg/dL  131 (H)   Creatinine Urine      mg/dL  171.0   Albumin Urine mg/L      mg/L  <12.0   Albumin Urine mg/g Cr           Hemoglobin A1C      0.0 - 5.6 % 8.2 (H) 7.3 (H)   TSH      0.30 - 4.20 uIU/mL  5.56 (H)   T4 Free      0.90 - 1.70 ng/dL  0.95            Additional ROS:   Constitutional, HEENT, Cardiovascular, Pulmonary, GI and , Neuro, MSK and Psych review of systems/symptoms are otherwise negative or unchanged from previous, except as  "noted above.      OBJECTIVE:  /74   Pulse 82   Temp 97.7  F (36.5  C) (Oral)   Ht 1.651 m (5' 5\")   Wt 94.2 kg (207 lb 9.6 oz)   LMP 01/16/2023 (Exact Date)   SpO2 98%   Breastfeeding No   BMI 34.55 kg/m     Estimated body mass index is 34.55 kg/m  as calculated from the following:    Height as of this encounter: 1.651 m (5' 5\").    Weight as of this encounter: 94.2 kg (207 lb 9.6 oz).  General appearance -   alert, no distress  Skin - No rashes or lesions.  Head - normocephalic, atraumatic  Eyes - DICK, EOMI, fundi exam with nondilated pupils negative.  Ears - External ears normal. Canals clear. TM's normal.  Nose/Sinuses - Nares normal. Septum midline. Mucosa normal. No drainage or sinus tenderness.  Oropharynx - No erythema, no adenopathy, no exudates.  Neck - Supple without adenopathy or thyromegaly. No bruits.  Lungs - Clear to auscultation without wheezes/rhonchi.  Heart - Regular rate and rhythm without murmurs, clicks, or gallops.  Nodes - No supraclavicular, axillary, or inguinal adenopathy palpable.  Breasts - No masses or tenderness palpable bilaterally. No nipple discharge to palpation  Abdomen - Abdomen obese, soft, non-tender. BS normal. No masses or hepatosplenomegaly palpable. No bruits.  Extremities -No cyanosis, clubbing or edema.    Musculoskeletal - Spine ROM normal. Muscular strength intact.   Peripheral pulses - radial=4/4, femoral=4/4, posterior tibial=4/4, dorsalis pedis=4/4,  Neuro - Gait normal. Reflexes normal and symmetric. Sensation grossly WNL.  Genital - Normal-appearing female external genitalia. No cervical  discharge. No cervical motion tenderness. Mild erythema superior cervix above the os that was slightly friable. Small amount of blood present on cervical brush when turned to obtain pap sample. No other complication with obtaining sample. Bimanual examination revealed the uterus to be normal. in size without masses palpable. No adnexal masses or tenderness " appreciated.  Rectal - deferred

## 2023-02-14 LAB
BKR LAB AP GYN ADEQUACY: NORMAL
BKR LAB AP GYN INTERPRETATION: NORMAL
BKR LAB AP HPV REFLEX: NORMAL
BKR LAB AP PREVIOUS ABNORMAL: NORMAL
PATH REPORT.COMMENTS IMP SPEC: NORMAL
PATH REPORT.COMMENTS IMP SPEC: NORMAL
PATH REPORT.RELEVANT HX SPEC: NORMAL

## 2023-02-16 LAB
HUMAN PAPILLOMA VIRUS 16 DNA: NEGATIVE
HUMAN PAPILLOMA VIRUS 18 DNA: NEGATIVE
HUMAN PAPILLOMA VIRUS FINAL DIAGNOSIS: NORMAL
HUMAN PAPILLOMA VIRUS OTHER HR: NEGATIVE

## 2023-02-17 ENCOUNTER — TRANSFERRED RECORDS (OUTPATIENT)
Dept: HEALTH INFORMATION MANAGEMENT | Facility: CLINIC | Age: 53
End: 2023-02-17
Payer: COMMERCIAL

## 2023-02-21 ENCOUNTER — OFFICE VISIT (OUTPATIENT)
Dept: ORTHOPEDICS | Facility: CLINIC | Age: 53
End: 2023-02-21
Attending: INTERNAL MEDICINE
Payer: COMMERCIAL

## 2023-02-21 VITALS
BODY MASS INDEX: 34.7 KG/M2 | HEIGHT: 65 IN | SYSTOLIC BLOOD PRESSURE: 122 MMHG | DIASTOLIC BLOOD PRESSURE: 86 MMHG | WEIGHT: 208.3 LBS

## 2023-02-21 DIAGNOSIS — G56.01 RIGHT CARPAL TUNNEL SYNDROME: Primary | ICD-10-CM

## 2023-02-21 DIAGNOSIS — G56.03 BILATERAL CARPAL TUNNEL SYNDROME: ICD-10-CM

## 2023-02-21 PROCEDURE — 99244 OFF/OP CNSLTJ NEW/EST MOD 40: CPT | Mod: 57 | Performed by: STUDENT IN AN ORGANIZED HEALTH CARE EDUCATION/TRAINING PROGRAM

## 2023-02-21 NOTE — PROGRESS NOTES
[facility] H&P NOTE:  Date: Feb 21, 2023    Patient Name: Natali Nicholson  MRN: [id number]    CHIEF COMPLAINT: carpal tunnel syndrome, bilateral    Dominant Hand: right  Occupation: works at [employer]      HPI:  Ms. Natali Nicholson is a 52 year old female right hand dominant who presents with carpal tunnel syndrome of bilateral wrist. R>L. Referred by Dr. Pablo Gomez for a consult. Symptoms have been ongoing since 2016 and has been worsening. She notes constant tingling in her right thumb, index and middle fingers. Not very painful. Previously was only with gripping. Symptoms now continue to worsen with gripping/ grasping. She has intermittent numbness that affects both hands, this is worse at night. Treatments tried include bracing during day and at night. Patient had EMG completed through the [facility] on 11/6/2018.     PMH  Diabetes: Yes- Type 2, A!C 7.3 on 2/8/23  Thyroid Problems: Yes- Hypothyroid  Smoking: No      PAST MEDICAL HISTORY:  Past Medical History:   Diagnosis Date     ASCUS on Pap smear 11/09    + HPV HR     Brachial neuritis or radiculitis NOS 9/03    LUE C7 level     CTS (carpal tunnel syndrome) 10/3/2011     History of colposcopy with cervical biopsy 10/22/09,10/16/12    neg,neg     HTN (hypertension)     no cardioloigist     Hyperlipidemia LDL goal <100       Hypothyroid      Lumbago 10/07    Mild degenerative disc disease at L5-S1     Mild major depression (H) 4/13/2018     Obesity      Papanicolaou smear of cervix with low grade squamous intraepithelial lesion (LGSIL) 10/18/10     Recurrent major depression (H)      Type 2 diabetes mellitus without complication  (goal A1C<7) 10/24/2015       PAST SURGICAL HISTORY:  Past Surgical History:   Procedure Laterality Date     DISCECTOMY, FUSION CERVICAL ANTERIOR TWO LEVELS, COMBINED N/A 9/20/2017    Procedure: COMBINED DISCECTOMY, FUSION CERVICAL ANTERIOR TWO LEVELS;  C4-6 Anterior  Cervical Discectomy and Fusion ;  Surgeon: Bharathi Salgado MD;  Location:  OR     [facility] NONSPECIFIC PROCEDURE  10/99    wisdom teeth extraction       MEDICATIONS:  Current Outpatient Medications   Medication     atorvastatin (LIPITOR) 40 MG tablet     blood glucose (ONETOUCH VERIO IQ) test strip     blood glucose monitoring (ONE TOUCH DELICA) lancets     blood glucose monitoring (ONETOUCH VERIO) meter device kit     Dulaglutide (TRULICITY) 3 MG/0.5ML SOPN     DULoxetine (CYMBALTA) 30 MG capsule     levothyroxine (SYNTHROID/LEVOTHROID) 112 MCG tablet     lisinopril (ZESTRIL) 40 MG tablet     metFORMIN (GLUCOPHAGE) 1000 MG tablet     thin (NO BRAND SPECIFIED) lancets     No current facility-administered medications for this visit.       ALLERGIES:   No Known Allergies    FAMILY HISTORY:  No pertinent family history    SOCIAL HISTORY:  Social History     Tobacco Use     Smoking status: Never     Smokeless tobacco: Never   Substance Use Topics     Alcohol use: No     Drug use: No       The patient's past medical, family, and social history was reviewed and confirmed.    REVIEW OF SYMPTOMS:      General: Negative   Eyes: Negative   Ear, Nose and Throat: Negative   Respiratory: Negative   Cardiovascular: Negative   Gastrointestinal: Negative   Genito-urinary: Negative   Musculoskeletal: Negative  Neurological: Negative   Psychological: Negative  HEME: Negative   ENDO: Negative   SKIN: Negative    VITALS:  There were no vitals filed for this visit.    EXAM:  General: NAD, A&Ox3  HEENT: NC/AT  CV: RRR by peripheral pulse  Pulmonary: Non-labored breathing on RA   BUE:  Skin intact, no arophy  Diminished sensation median nerve on the right, intact ulnar/radial  Intact ulnar/median/radial on left  2 pt 12 mm right median, 5mm all other distributions  +Tinel's at carpal tunnel on right, negative on left  +Bilateral Durkan's compression tests  WWP CR< 2s       EMG/NCS 11/6/2018    Prolonged distal onset latency  bilateral median motor nerves.  Prolonged distal peak latency left median sensory nerve.  No response of right median sensory nerve.  Increased 2+ motor unit amplitude left APB.  Right APB increased 1+ motor unit amplitude and reduced recruitment.  Findings consistent with severe bilateral median neuropathies at the wrists consistent with carpal tunnel syndrome.  Worse on the right    I have personally reviewed the above images and labs.         IMPRESSION AND RECOMMENDATIONS:  Ms. Natali Nicholson is a 52 year old female right hand dominant with bilateral carpal tunnel syndrome, right worse than left.    Given the EMG findings and severity of symptoms, patient is a candidate for carpal tunnel release. We briefly discussed injections, she would prefer to have the more definitive option.    The indications for surgery were discussed with the patient. The benefits, risks, and alternatives of operative management were discussed in detail with the patient. The patient understands that the risks of surgery include, but are not limited to: infection, bleeding, injury to nearby structures (such as nerves, blood vessels, and tendons), wound healing problems, need for additional surgery, pain, stiffness, scarring, need for rehabilitation, and anesthetic complications.  Patient expressed understanding and elected to proceed with surgery. All questions were answered to the patient's satisfaction.    Case request placed for right carpal tunnel release, local + MAC.    Chung Thomas MD    Hand, Upper Extremity & Microvascular Surgery  Department of Orthopaedic Surgery  [facility]

## 2023-02-21 NOTE — LETTER
2/21/2023         RE: Natali Nicholson  [address]  [facility] [address]        Dear Colleague,    Thank you for referring your patient, Natali Nicholson, to the [facility] [place]. Please see a copy of my visit note below.    Orthopaedic Surgery Hand and Upper Extremity Clinic H&P NOTE:  Date: Feb 21, 2023    Patient Name: Natali Nicholson  MRN: [id number]    CHIEF COMPLAINT: carpal tunnel syndrome, bilateral    Dominant Hand: right  Occupation: works at [employer]      HPI:  Ms. Natali Nicholson is a 52 year old female right hand dominant who presents with carpal tunnel syndrome of bilateral wrist. R>L. Referred by Dr. Pablo Gomez for a consult. Symptoms have been ongoing since 2016 and has been worsening. She notes constant tingling in her right thumb, index and middle fingers. Not very painful. Previously was only with gripping. Symptoms now continue to worsen with gripping/ grasping. She has intermittent numbness that affects both hands, this is worse at night. Treatments tried include bracing during day and at night. Patient had EMG completed through the [facility] on 11/6/2018.     PMH  Diabetes: Yes- Type 2, A!C 7.3 on 2/8/23  Thyroid Problems: Yes- Hypothyroid  Smoking: No      PAST MEDICAL HISTORY:  Past Medical History:   Diagnosis Date     ASCUS on Pap smear 11/09    + HPV HR     Brachial neuritis or radiculitis NOS 9/03    LUE C7 level     CTS (carpal tunnel syndrome) 10/3/2011     History of colposcopy with cervical biopsy 10/22/09,10/16/12    neg,neg     HTN (hypertension)     no cardioloigist     Hyperlipidemia LDL goal <100       Hypothyroid      Lumbago 10/07    Mild degenerative disc disease at L5-S1     Mild major depression (H) 4/13/2018     Obesity      Papanicolaou smear of cervix with low grade squamous intraepithelial lesion (LGSIL) 10/18/10     Recurrent major depression (H)      Type 2 diabetes mellitus without complication   (goal A1C<7) 10/24/2015       PAST SURGICAL HISTORY:  Past Surgical History:   Procedure Laterality Date     DISCECTOMY, FUSION CERVICAL ANTERIOR TWO LEVELS, COMBINED N/A 9/20/2017    Procedure: COMBINED DISCECTOMY, FUSION CERVICAL ANTERIOR TWO LEVELS;  C4-6 Anterior Cervical Discectomy and Fusion ;  Surgeon: Bharathi Salgado MD;  Location:  OR     [facility] NONSPECIFIC PROCEDURE  10/99    wisdom teeth extraction       MEDICATIONS:  Current Outpatient Medications   Medication     atorvastatin (LIPITOR) 40 MG tablet     blood glucose (ONETOUCH VERIO IQ) test strip     blood glucose monitoring (ONE TOUCH DELICA) lancets     blood glucose monitoring (ONETOUCH VERIO) meter device kit     Dulaglutide (TRULICITY) 3 MG/0.5ML SOPN     DULoxetine (CYMBALTA) 30 MG capsule     levothyroxine (SYNTHROID/LEVOTHROID) 112 MCG tablet     lisinopril (ZESTRIL) 40 MG tablet     metFORMIN (GLUCOPHAGE) 1000 MG tablet     thin (NO BRAND SPECIFIED) lancets     No current facility-administered medications for this visit.       ALLERGIES:   No Known Allergies    FAMILY HISTORY:  No pertinent family history    SOCIAL HISTORY:  Social History     Tobacco Use     Smoking status: Never     Smokeless tobacco: Never   Substance Use Topics     Alcohol use: No     Drug use: No       The patient's past medical, family, and social history was reviewed and confirmed.    REVIEW OF SYMPTOMS:      General: Negative   Eyes: Negative   Ear, Nose and Throat: Negative   Respiratory: Negative   Cardiovascular: Negative   Gastrointestinal: Negative   Genito-urinary: Negative   Musculoskeletal: Negative  Neurological: Negative   Psychological: Negative  HEME: Negative   ENDO: Negative   SKIN: Negative    VITALS:  There were no vitals filed for this visit.    EXAM:  General: NAD, A&Ox3  HEENT: NC/AT  CV: RRR by peripheral pulse  Pulmonary: Non-labored breathing on RA   BUE:  Skin intact, no arophy  Diminished sensation median nerve on the right, intact  ulnar/radial  Intact ulnar/median/radial on left  2 pt 12 mm right median, 5mm all other distributions  +Tinel's at carpal tunnel on right, negative on left  +Bilateral Durkan's compression tests  WWP CR< 2s       EMG/NCS 11/6/2018    Prolonged distal onset latency bilateral median motor nerves.  Prolonged distal peak latency left median sensory nerve.  No response of right median sensory nerve.  Increased 2+ motor unit amplitude left APB.  Right APB increased 1+ motor unit amplitude and reduced recruitment.  Findings consistent with severe bilateral median neuropathies at the wrists consistent with carpal tunnel syndrome.  Worse on the right    I have personally reviewed the above images and labs.         IMPRESSION AND RECOMMENDATIONS:  Ms. Natali Nicholson is a 52 year old female right hand dominant with bilateral carpal tunnel syndrome, right worse than left.    Given the EMG findings and severity of symptoms, patient is a candidate for carpal tunnel release. We briefly discussed injections, she would prefer to have the more definitive option.    The indications for surgery were discussed with the patient. The benefits, risks, and alternatives of operative management were discussed in detail with the patient. The patient understands that the risks of surgery include, but are not limited to: infection, bleeding, injury to nearby structures (such as nerves, blood vessels, and tendons), wound healing problems, need for additional surgery, pain, stiffness, scarring, need for rehabilitation, and anesthetic complications.  Patient expressed understanding and elected to proceed with surgery. All questions were answered to the patient's satisfaction.    Case request placed for right carpal tunnel release, local + MAC.    Chung Thomas MD    Hand, Upper Extremity & Microvascular Surgery  Department of Orthopaedic Surgery  [facility]            Again, thank you for allowing me to  participate in the care of your patient.        Sincerely,        Chung Thomas MD

## 2023-03-06 ENCOUNTER — TELEPHONE (OUTPATIENT)
Dept: ORTHOPEDICS | Facility: CLINIC | Age: 53
End: 2023-03-06
Payer: COMMERCIAL

## 2023-03-06 NOTE — TELEPHONE ENCOUNTER
Patient is scheduled for surgery with Dr. Thomas    Spoke with: Natali    Date of Surgery: 3/30/23    Location: [facility]    Informed patient they will need an adult  Yes    H&P: Scheduled with PCP 3/17/23    Pre-procedure COVID-19 Test: N/A    Additional imaging/appointments: POP Made    Surgery packet: Mailed     Additional comments: N/A

## 2023-03-17 ENCOUNTER — OFFICE VISIT (OUTPATIENT)
Dept: INTERNAL MEDICINE | Facility: CLINIC | Age: 53
End: 2023-03-17
Payer: COMMERCIAL

## 2023-03-17 VITALS
SYSTOLIC BLOOD PRESSURE: 122 MMHG | TEMPERATURE: 98 F | DIASTOLIC BLOOD PRESSURE: 82 MMHG | BODY MASS INDEX: 34.53 KG/M2 | WEIGHT: 207.5 LBS | OXYGEN SATURATION: 99 % | HEART RATE: 71 BPM

## 2023-03-17 DIAGNOSIS — E66.811 CLASS 1 OBESITY WITH SERIOUS COMORBIDITY AND BODY MASS INDEX (BMI) OF 34.0 TO 34.9 IN ADULT, UNSPECIFIED OBESITY TYPE: ICD-10-CM

## 2023-03-17 DIAGNOSIS — G56.01 CARPAL TUNNEL SYNDROME OF RIGHT WRIST: ICD-10-CM

## 2023-03-17 DIAGNOSIS — F32.5 MAJOR DEPRESSIVE DISORDER WITH SINGLE EPISODE, IN FULL REMISSION (H): ICD-10-CM

## 2023-03-17 DIAGNOSIS — E78.5 HYPERLIPIDEMIA LDL GOAL <100: ICD-10-CM

## 2023-03-17 DIAGNOSIS — E03.9 HYPOTHYROIDISM, UNSPECIFIED TYPE: ICD-10-CM

## 2023-03-17 DIAGNOSIS — Z01.818 PREOP GENERAL PHYSICAL EXAM: Primary | ICD-10-CM

## 2023-03-17 DIAGNOSIS — E11.9 TYPE 2 DIABETES MELLITUS WITHOUT COMPLICATION, WITHOUT LONG-TERM CURRENT USE OF INSULIN (H): ICD-10-CM

## 2023-03-17 LAB
ALBUMIN SERPL BCG-MCNC: 4.4 G/DL (ref 3.5–5.2)
ALP SERPL-CCNC: 72 U/L (ref 35–104)
ALT SERPL W P-5'-P-CCNC: 25 U/L (ref 10–35)
ANION GAP SERPL CALCULATED.3IONS-SCNC: 13 MMOL/L (ref 7–15)
AST SERPL W P-5'-P-CCNC: 21 U/L (ref 10–35)
BILIRUB SERPL-MCNC: 0.3 MG/DL
BUN SERPL-MCNC: 13.1 MG/DL (ref 6–20)
CALCIUM SERPL-MCNC: 9.6 MG/DL (ref 8.6–10)
CHLORIDE SERPL-SCNC: 104 MMOL/L (ref 98–107)
CHOLEST SERPL-MCNC: 167 MG/DL
CK SERPL-CCNC: 143 U/L (ref 26–192)
CREAT SERPL-MCNC: 0.86 MG/DL (ref 0.51–0.95)
DEPRECATED HCO3 PLAS-SCNC: 20 MMOL/L (ref 22–29)
GFR SERPL CREATININE-BSD FRML MDRD: 82 ML/MIN/1.73M2
GLUCOSE SERPL-MCNC: 169 MG/DL (ref 70–99)
HDLC SERPL-MCNC: 42 MG/DL
LDLC SERPL CALC-MCNC: 75 MG/DL
NONHDLC SERPL-MCNC: 125 MG/DL
POTASSIUM SERPL-SCNC: 4.2 MMOL/L (ref 3.4–5.3)
PROT SERPL-MCNC: 7.9 G/DL (ref 6.4–8.3)
SODIUM SERPL-SCNC: 137 MMOL/L (ref 136–145)
TRIGL SERPL-MCNC: 252 MG/DL
TSH SERPL DL<=0.005 MIU/L-ACNC: 1.92 UIU/ML (ref 0.3–4.2)

## 2023-03-17 PROCEDURE — 99214 OFFICE O/P EST MOD 30 MIN: CPT | Performed by: INTERNAL MEDICINE

## 2023-03-17 PROCEDURE — 80061 LIPID PANEL: CPT | Performed by: INTERNAL MEDICINE

## 2023-03-17 PROCEDURE — 82550 ASSAY OF CK (CPK): CPT | Performed by: INTERNAL MEDICINE

## 2023-03-17 PROCEDURE — 84443 ASSAY THYROID STIM HORMONE: CPT | Performed by: INTERNAL MEDICINE

## 2023-03-17 PROCEDURE — 80053 COMPREHEN METABOLIC PANEL: CPT | Performed by: INTERNAL MEDICINE

## 2023-03-17 PROCEDURE — 36415 COLL VENOUS BLD VENIPUNCTURE: CPT | Performed by: INTERNAL MEDICINE

## 2023-03-17 NOTE — PROGRESS NOTES
[address] [address]  Phone: [phone]  Primary Provider: Pablo Velazquez  Pre-op Performing Provider: PABLO VELAZQUEZ      PREOPERATIVE EVALUATION:  Today's date: 3/17/2023    Natali Nicholson is a 52 year old female who presents for a preoperative evaluation.    Surgical Information:  Surgery/Procedure: carpal tunnel right side  Surgery Location: [facility]   Surgeon:   Surgery Date: 3/30/23  Time of Surgery:  2:10pm  Where patient plans to recover: At home with family  Fax number for surgical facility:  Not needed, EPIC    Type of Anesthesia Anticipated: to be determined    Assessment & Plan     The proposed surgical procedure is considered LOW risk.    Preop general physical exam  Appears medically stable to proceed with surgery as scheduled    Carpal tunnel syndrome of right wrist  Confirmed on EMG    Hypothyroidism, unspecified type  Controlled.  Continue levothyroxine  - TSH with free T4 reflex; Future  - TSH with free T4 reflex    Hyperlipidemia LDL goal <100  LDL at goal with current statin therapy.  - Comprehensive metabolic panel; Future  - CK total; Future  - Lipid panel reflex to direct LDL Non-fasting; Future  - Comprehensive metabolic panel  - CK total  - Lipid panel reflex to direct LDL Non-fasting    Type 2 diabetes mellitus without complication, without long-term current use of insulin (H)  Controlled overall and improved from previous with recent higher dose Trulicity.  Not checking blood sugars recently.  Compliant with diabetic diet.  Will  repeat A1c again in June  - Comprehensive metabolic panel; Future  - Comprehensive metabolic panel    Major depressive disorder with single episode, in full remission (H)  Controlled with duloxetine.  PHQ-9 = 0     Class 1 obesity with serious comorbidity and body mass index (BMI) of 34.0 to 34.9 in adult, unspecified obesity type  Weight improving with higher dose Trulicity.  No known  history of sleep apnea and good energy during the day.  At risk for sleep apnea however based on body habitus.  Will need to monitor O2 sats closely in the perioperative time period for signs of desaturation        Risks and Recommendations:  The patient has the following additional risks and recommendations for perioperative complications:   - No identified additional risk factors other than previously addressed    Patient instructions   Approval given to proceed with proposed procedure, without further diagnostic evaluation.  No solid food after midnight prior to your  procedure. May have clear liquids up to 2 hours prior to the  procedure   May use Tylenol for pain control  between then and your procedure  Take  Lisinopril, Levothyroxine, Duloxetine and Atorvastatin   when you first get up the  AM of the  procedure    Hold other prescription (Metformin)  and over the counter medications/supplements the day of the  procedure. May resume usual medications/supplements after the procedure unless instructed otherwise by your surgeon   Labs as ordered today  Continue calorie/carbohydrate reduction and increase exercise for further weight loss  Nonfasting A1C blood test in June.  [facility]      RECOMMENDATION:  APPROVAL GIVEN to proceed with proposed procedure, without further diagnostic evaluation.         Subjective     HPI related to upcoming procedure:    History bilateral carpal tunnel syndrome with symptoms worse on the right compared to left.  Previously underwent confirming EMG through the [facility] 11/6/2018.  Because of worsening symptoms, patient was referred to orthopedic hand surgeon and following consultation, has elected to undergo surgical treatment on the right side.  See below for other medical issues.  Regarding current exercise tolerance, patient states she is very active during the day working at [employer] and denies chest pain or shortness of  breath with that activity.     Preop Questions 3/17/2023   1. Have you ever had a heart attack or stroke? No   2. Have you ever had surgery on your heart or blood vessels, such as a stent placement, a coronary artery bypass, or surgery on an artery in your head, neck, heart, or legs? No   3. Do you have chest pain with activity? No   4. Do you have a history of  heart failure? No   5. Do you currently have a cold, bronchitis or symptoms of other infection? No   6. Do you have a cough, shortness of breath, or wheezing? No   7. Do you or anyone in your family have previous history of blood clots? No   8. Do you or does anyone in your family have a serious bleeding problem such as prolonged bleeding following surgeries or cuts? No   9. Have you ever had problems with anemia or been told to take iron pills? No   10. Have you had any abnormal blood loss such as black, tarry or bloody stools, or abnormal vaginal bleeding? No   11. Have you ever had a blood transfusion? No   12. Are you willing to have a blood transfusion if it is medically needed before, during, or after your surgery? Yes   13. Have you or any of your relatives ever had problems with anesthesia? No   14. Do you have sleep apnea, excessive snoring or daytime drowsiness? No   15. Do you have any artifical heart valves or other implanted medical devices like a pacemaker, defibrillator, or continuous glucose monitor? No   16. Do you have artificial joints? No   17. Are you allergic to latex? No   18. Is there any chance that you may be pregnant? No       Health Care Directive:  Patient does not have a Health Care Directive or Living Will:     Preoperative Review of :   reviewed - no record of controlled substances prescribed.      Status of Chronic Conditions:  DEPRESSION - Patient has a long history of Depression of mild severity requiring medication for control with recent symptoms being well controlled.Current symptoms of depression include none.   PHQ9 = 0 with medication therapy    DIABETES - Patient has a longstanding history of DiabetesType Type II . Patient is being treated with Trulicity and metformin and denies significant side effects. Control has been good. Complicating factors include but are not limited to: hypertension and hyperlipidemia.     HYPERLIPIDEMIA - Patient has a long history of significant Hyperlipidemia requiring medication for treatment with recent good control. Patient reports no problems or side effects with the medication.     HYPERTENSION - Patient has longstanding history of HTN , currently denies any symptoms referable to elevated blood pressure. Specifically denies chest pain, palpitations, dyspnea, orthopnea, PND or peripheral edema. Blood pressure readings have been in normal range. Current medication regimen is as listed below. Patient denies any side effects of medication.     HYPOTHYROIDISM - Patient has a longstanding history of chronic Hypothyroidism. Patient has been doing well, noting no tremor, insomnia, hair loss or changes in skin texture. Continues to take medications as directed, without adverse reactions or side effects. Last TSH   Lab Results   Component Value Date    TSH 1.92 03/17/2023   .        Review of Systems  CONSTITUTIONAL: NEGATIVE for fever, chills.  Weight down 1 pound recently after increasing Trulicity dose after previous weight gain  INTEGUMENTARY/SKIN: NEGATIVE for worrisome rashes, moles or lesions  EYES: NEGATIVE for vision changes or irritation  ENT/MOUTH: NEGATIVE for ear, mouth and throat problems  RESP: NEGATIVE for significant cough or SOB  CV: NEGATIVE for chest pain, palpitations or peripheral edema  GI: NEGATIVE for nausea, abdominal pain, heartburn, or change in bowel habits  : NEGATIVE for frequency, dysuria, or hematuria  MUSCULOSKELETAL: NEGATIVE for significant arthralgias or myalgia  NEURO: NEGATIVE for weakness, dizziness.  Has numbness/paresthesias in median nerve distribution  bilateral hands from carpal tunnel compression  ENDOCRINE:  See above re: lipids, diabetes, hypothyroidism  HEME: NEGATIVE for bleeding problems  PSYCHIATRIC:  POSITIVE for well controlled depression sx. PHQ9 = 0    Patient Active Problem List    Diagnosis Date Noted     Severe obesity with body mass index (BMI) of 35.0 to 39.9 with comorbidity (H) 03/18/2021     Priority: Medium     Mild major depression (H) 04/13/2018     Priority: Medium     S/P cervical spinal fusion 09/20/2017     Priority: Medium     Hypothyroidism, unspecified type 08/14/2017     Priority: Medium     HGSIL on Pap smear of cervix 12/13/2016     Priority: Medium     06/02/08 NL pap  07/10/09 ASCUS pap, +HPV HR, pt. Is to schedule a colp  10/22/09 colp done-neg, pt. Is to have a repeat pap in one year.  10/18/10 LSIL cannot exclude HGSIL, pt. Is to schedule a colp.  10/16/12 colp done-neg, pt. Is to have a repeat pap in one year.  11/08/16 NL pap, neg HPV, pt. Is to have co-testing in three year, HM and HX updated,tracking has been started  2/10/23 NIL pap, Neg HPV.  Plan cotest in 3 years       Type 2 diabetes mellitus without complication, without long-term current use of insulin (H) 11/08/2016     Priority: Medium     Essential hypertension with goal blood pressure less than 130/80 10/04/2016     Priority: Medium     CTS (carpal tunnel syndrome) 10/03/2011     Priority: Medium     HYPERLIPIDEMIA LDL GOAL <100 10/31/2010     Priority: Medium      Past Medical History:   Diagnosis Date     ASCUS on Pap smear 11/09    + HPV HR     Brachial neuritis or radiculitis NOS 9/03    LUE C7 level     CTS (carpal tunnel syndrome) 10/3/2011     History of colposcopy with cervical biopsy 10/22/09,10/16/12    neg,neg     HTN (hypertension)     no cardioloigist     Hyperlipidemia LDL goal <100       Hypothyroid      Lumbago 10/07    Mild degenerative disc disease at L5-S1     Mild major depression (H) 4/13/2018     Obesity      Papanicolaou smear of cervix  with low grade squamous intraepithelial lesion (LGSIL) 10/18/10     Recurrent major depression (H)      Type 2 diabetes mellitus without complication  (goal A1C<7) 10/24/2015     Past Surgical History:   Procedure Laterality Date     DISCECTOMY, FUSION CERVICAL ANTERIOR TWO LEVELS, COMBINED N/A 2017    Procedure: COMBINED DISCECTOMY, FUSION CERVICAL ANTERIOR TWO LEVELS;  C4-6 Anterior Cervical Discectomy and Fusion ;  Surgeon: Bharathi Salgado MD;  Location:  OR     [facility] NONSPECIFIC PROCEDURE  10/99    wisdom teeth extraction     Current Outpatient Medications   Medication Sig Dispense Refill     atorvastatin (LIPITOR) 40 MG tablet Take 1 tablet (40 mg) by mouth daily 90 tablet 3     blood glucose (ONETOUCH VERIO IQ) test strip Use to test blood sugars 2 times daily or as directed. 200 strip 3     blood glucose monitoring (ONE TOUCH DELICA) lancets Use to test blood sugars 1 times daily or as directed. Reference #[id number] 1 Box 3     blood glucose monitoring (ONETOUCH VERIO) meter device kit Use to test blood sugar 1 times daily or as directed.  Reference #[id number] 1 kit 0     Dulaglutide (TRULICITY) 3 MG/0.5ML SOPN Inject 3 mg Subcutaneous once a week 6 mL 3     DULoxetine (CYMBALTA) 30 MG capsule TAKE 1 CAPSULE TWICE DAILY 180 capsule 3     levothyroxine (SYNTHROID/LEVOTHROID) 112 MCG tablet Take 1 tablet (112 mcg) by mouth daily 90 tablet 3     lisinopril (ZESTRIL) 40 MG tablet TAKE 1 TABLET DAILY (DOSE  CHANGE) 90 tablet 3     metFORMIN (GLUCOPHAGE) 1000 MG tablet TAKE 1 TABLET TWICE A DAY  WITH BREAKFAST AND DINNER 180 tablet 3     thin (NO BRAND SPECIFIED) lancets Use to test blood sugar 2 times daily or as directed. 100 each 11       No Known Allergies     Social History     Tobacco Use     Smoking status: Never     Smokeless tobacco: Never   Substance Use Topics     Alcohol use: No     Family History   Problem Relation Age of Onset     Heart Disease Mother          age 48      Diabetes Mother      Diabetes Father      Psychotic Disorder Father         Schizophrenia     Allergies Brother      Cancer Maternal Grandmother         Cervical cancer     Depression Maternal Aunt      History   Drug Use No         Objective     /82   Pulse 71   Temp 98  F (36.7  C) (Temporal)   Wt 94.1 kg (207 lb 8 oz)   LMP 01/16/2023 (Exact Date)   SpO2 99%   BMI 34.53 kg/m      Physical Exam  Eye: PERRL, EOMI  HENT: ear canals and TM's normal and nose and mouth without ulcers or lesions   Neck: no adenopathy. Thyroid normal to palpation. No bruits  Pulm: Lungs clear to auscultation   CV: Regular rates and rhythm  GI: Soft, obese, nontender, Normal active bowel sounds, No hepatosplenomegaly or masses palpable  Ext: Peripheral pulses intact. No edema.  Neuro: Normal strength and tone, sensory exam grossly normal except for reduced light touch sensation median nerve distribution bilateral hands      Recent Labs   Lab Test 02/08/23  0856 10/11/22  0754 03/08/22  0837     --  137   POTASSIUM 4.3  --  3.8   CR 0.79  --  0.77   A1C 7.3* 8.2* 6.6*        Diagnostics:  Component      Latest Ref Rng & Units 2/8/2023 3/17/2023   Sodium      136 - 145 mmol/L  137   Potassium      3.4 - 5.3 mmol/L  4.2   Chloride      98 - 107 mmol/L  104   Carbon Dioxide (CO2)      22 - 29 mmol/L  20 (L)   Anion Gap      7 - 15 mmol/L  13   Urea Nitrogen      6.0 - 20.0 mg/dL  13.1   Creatinine      0.51 - 0.95 mg/dL  0.86   Calcium      8.6 - 10.0 mg/dL  9.6   Glucose      70 - 99 mg/dL  169 (H)   Alkaline Phosphatase      35 - 104 U/L  72   AST      10 - 35 U/L  21   ALT      10 - 35 U/L  25   Protein Total      6.4 - 8.3 g/dL  7.9   Albumin      3.5 - 5.2 g/dL  4.4   Bilirubin Total      <=1.2 mg/dL  0.3   GFR Estimate      >60 mL/min/1.73m2  82   Cholesterol      <200 mg/dL  167   Triglycerides      <150 mg/dL  252 (H)   HDL Cholesterol      >=50 mg/dL  42 (L)   LDL Cholesterol Calculated      <=100 mg/dL  75   Non  HDL Cholesterol      <130 mg/dL  125   Hemoglobin A1C      0.0 - 5.6 % 7.3 (H)    TSH      0.30 - 4.20 uIU/mL  1.92   CK Total      26 - 192 U/L  143       No EKG required, no history of coronary heart disease, significant arrhythmia, peripheral arterial disease or other structural heart disease.    Revised Cardiac Risk Index (RCRI):  The patient has the following serious cardiovascular risks for perioperative complications:   - No serious cardiac risks = 0 points     RCRI Interpretation: 0 points: Class I (very low risk - 0.4% complication rate)           Signed Electronically by: Pablo Gomez MD  Copy of this evaluation report is provided to requesting physician.

## 2023-03-18 PROBLEM — E66.811 CLASS 1 OBESITY WITH SERIOUS COMORBIDITY AND BODY MASS INDEX (BMI) OF 34.0 TO 34.9 IN ADULT, UNSPECIFIED OBESITY TYPE: Status: ACTIVE | Noted: 2023-03-18

## 2023-03-29 ENCOUNTER — ANESTHESIA EVENT (OUTPATIENT)
Dept: SURGERY | Facility: AMBULATORY SURGERY CENTER | Age: 53
End: 2023-03-29
Payer: COMMERCIAL

## 2023-03-30 ENCOUNTER — ANESTHESIA (OUTPATIENT)
Dept: SURGERY | Facility: AMBULATORY SURGERY CENTER | Age: 53
End: 2023-03-30
Payer: COMMERCIAL

## 2023-03-30 ENCOUNTER — HOSPITAL ENCOUNTER (OUTPATIENT)
Facility: AMBULATORY SURGERY CENTER | Age: 53
Discharge: HOME | End: 2023-03-30
Attending: STUDENT IN AN ORGANIZED HEALTH CARE EDUCATION/TRAINING PROGRAM | Admitting: STUDENT IN AN ORGANIZED HEALTH CARE EDUCATION/TRAINING PROGRAM
Payer: COMMERCIAL

## 2023-03-30 VITALS
WEIGHT: 207 LBS | HEIGHT: 65 IN | RESPIRATION RATE: 16 BRPM | SYSTOLIC BLOOD PRESSURE: 113 MMHG | BODY MASS INDEX: 34.49 KG/M2 | TEMPERATURE: 97.3 F | OXYGEN SATURATION: 98 % | HEART RATE: 81 BPM | DIASTOLIC BLOOD PRESSURE: 72 MMHG

## 2023-03-30 DIAGNOSIS — G56.03 BILATERAL CARPAL TUNNEL SYNDROME: Primary | ICD-10-CM

## 2023-03-30 LAB
GLUCOSE BLDC GLUCOMTR-MCNC: 107 MG/DL (ref 70–99)
HCG UR QL: NEGATIVE
INTERNAL QC OK POCT: NORMAL
POCT KIT EXPIRATION DATE: NORMAL
POCT KIT LOT NUMBER: NORMAL

## 2023-03-30 PROCEDURE — 64721 CARPAL TUNNEL SURGERY: CPT | Mod: RT

## 2023-03-30 PROCEDURE — 81025 URINE PREGNANCY TEST: CPT | Performed by: PATHOLOGY

## 2023-03-30 PROCEDURE — 64721 CARPAL TUNNEL SURGERY: CPT | Mod: RT | Performed by: STUDENT IN AN ORGANIZED HEALTH CARE EDUCATION/TRAINING PROGRAM

## 2023-03-30 PROCEDURE — 82962 GLUCOSE BLOOD TEST: CPT | Performed by: PATHOLOGY

## 2023-03-30 RX ORDER — SODIUM CHLORIDE, SODIUM LACTATE, POTASSIUM CHLORIDE, CALCIUM CHLORIDE 600; 310; 30; 20 MG/100ML; MG/100ML; MG/100ML; MG/100ML
INJECTION, SOLUTION INTRAVENOUS CONTINUOUS
Status: DISCONTINUED | OUTPATIENT
Start: 2023-03-30 | End: 2023-03-31 | Stop reason: HOSPADM

## 2023-03-30 RX ORDER — FENTANYL CITRATE-0.9 % NACL/PF 10 MCG/ML
PLASTIC BAG, INJECTION (ML) INTRAVENOUS PRN
Status: DISCONTINUED | OUTPATIENT
Start: 2023-03-30 | End: 2023-03-30

## 2023-03-30 RX ORDER — LIDOCAINE 40 MG/G
CREAM TOPICAL
Status: DISCONTINUED | OUTPATIENT
Start: 2023-03-30 | End: 2023-03-31 | Stop reason: HOSPADM

## 2023-03-30 RX ORDER — PROPOFOL 10 MG/ML
INJECTION, EMULSION INTRAVENOUS CONTINUOUS PRN
Status: DISCONTINUED | OUTPATIENT
Start: 2023-03-30 | End: 2023-03-30

## 2023-03-30 RX ORDER — LIDOCAINE HYDROCHLORIDE AND EPINEPHRINE 10; 10 MG/ML; UG/ML
10 INJECTION, SOLUTION INFILTRATION; PERINEURAL ONCE
Status: DISCONTINUED | OUTPATIENT
Start: 2023-03-30 | End: 2023-03-31 | Stop reason: HOSPADM

## 2023-03-30 RX ORDER — ONDANSETRON 2 MG/ML
INJECTION INTRAMUSCULAR; INTRAVENOUS PRN
Status: DISCONTINUED | OUTPATIENT
Start: 2023-03-30 | End: 2023-03-30

## 2023-03-30 RX ORDER — LIDOCAINE HYDROCHLORIDE AND EPINEPHRINE 10; 10 MG/ML; UG/ML
INJECTION, SOLUTION INFILTRATION; PERINEURAL PRN
Status: DISCONTINUED | OUTPATIENT
Start: 2023-03-30 | End: 2023-03-30 | Stop reason: HOSPADM

## 2023-03-30 RX ORDER — GABAPENTIN 300 MG/1
300 CAPSULE ORAL
Status: COMPLETED | OUTPATIENT
Start: 2023-03-30 | End: 2023-03-30

## 2023-03-30 RX ORDER — FENTANYL CITRATE 50 UG/ML
INJECTION, SOLUTION INTRAMUSCULAR; INTRAVENOUS PRN
Status: DISCONTINUED | OUTPATIENT
Start: 2023-03-30 | End: 2023-03-30

## 2023-03-30 RX ORDER — FENTANYL CITRATE 50 UG/ML
50 INJECTION, SOLUTION INTRAMUSCULAR; INTRAVENOUS EVERY 5 MIN PRN
Status: DISCONTINUED | OUTPATIENT
Start: 2023-03-30 | End: 2023-03-31 | Stop reason: HOSPADM

## 2023-03-30 RX ORDER — OXYCODONE HYDROCHLORIDE 5 MG/1
5 TABLET ORAL EVERY 6 HOURS PRN
Qty: 3 TABLET | Refills: 0 | Status: SHIPPED | OUTPATIENT
Start: 2023-03-30 | End: 2023-04-02

## 2023-03-30 RX ORDER — MAGNESIUM HYDROXIDE 1200 MG/15ML
LIQUID ORAL PRN
Status: DISCONTINUED | OUTPATIENT
Start: 2023-03-30 | End: 2023-03-30 | Stop reason: HOSPADM

## 2023-03-30 RX ORDER — ONDANSETRON 4 MG/1
4 TABLET, ORALLY DISINTEGRATING ORAL EVERY 30 MIN PRN
Status: DISCONTINUED | OUTPATIENT
Start: 2023-03-30 | End: 2023-03-31 | Stop reason: HOSPADM

## 2023-03-30 RX ORDER — ONDANSETRON 2 MG/ML
4 INJECTION INTRAMUSCULAR; INTRAVENOUS EVERY 30 MIN PRN
Status: DISCONTINUED | OUTPATIENT
Start: 2023-03-30 | End: 2023-03-31 | Stop reason: HOSPADM

## 2023-03-30 RX ORDER — LIDOCAINE HYDROCHLORIDE 20 MG/ML
INJECTION, SOLUTION INFILTRATION; PERINEURAL PRN
Status: DISCONTINUED | OUTPATIENT
Start: 2023-03-30 | End: 2023-03-30

## 2023-03-30 RX ORDER — HYDROMORPHONE HYDROCHLORIDE 1 MG/ML
0.4 INJECTION, SOLUTION INTRAMUSCULAR; INTRAVENOUS; SUBCUTANEOUS EVERY 5 MIN PRN
Status: DISCONTINUED | OUTPATIENT
Start: 2023-03-30 | End: 2023-03-31 | Stop reason: HOSPADM

## 2023-03-30 RX ORDER — ACETAMINOPHEN 325 MG/1
975 TABLET ORAL ONCE
Status: COMPLETED | OUTPATIENT
Start: 2023-03-30 | End: 2023-03-30

## 2023-03-30 RX ORDER — PROPOFOL 10 MG/ML
INJECTION, EMULSION INTRAVENOUS PRN
Status: DISCONTINUED | OUTPATIENT
Start: 2023-03-30 | End: 2023-03-30

## 2023-03-30 RX ORDER — HYDROMORPHONE HYDROCHLORIDE 1 MG/ML
0.2 INJECTION, SOLUTION INTRAMUSCULAR; INTRAVENOUS; SUBCUTANEOUS EVERY 5 MIN PRN
Status: DISCONTINUED | OUTPATIENT
Start: 2023-03-30 | End: 2023-03-31 | Stop reason: HOSPADM

## 2023-03-30 RX ORDER — FENTANYL CITRATE 50 UG/ML
25 INJECTION, SOLUTION INTRAMUSCULAR; INTRAVENOUS EVERY 5 MIN PRN
Status: DISCONTINUED | OUTPATIENT
Start: 2023-03-30 | End: 2023-03-31 | Stop reason: HOSPADM

## 2023-03-30 RX ADMIN — GABAPENTIN 300 MG: 300 CAPSULE ORAL at 12:43

## 2023-03-30 RX ADMIN — FENTANYL CITRATE 50 MCG: 50 INJECTION, SOLUTION INTRAMUSCULAR; INTRAVENOUS at 13:32

## 2023-03-30 RX ADMIN — PROPOFOL 150 MCG/KG/MIN: 10 INJECTION, EMULSION INTRAVENOUS at 13:35

## 2023-03-30 RX ADMIN — ONDANSETRON 4 MG: 2 INJECTION INTRAMUSCULAR; INTRAVENOUS at 13:36

## 2023-03-30 RX ADMIN — PROPOFOL 40 MG: 10 INJECTION, EMULSION INTRAVENOUS at 13:35

## 2023-03-30 RX ADMIN — ACETAMINOPHEN 975 MG: 325 TABLET ORAL at 12:43

## 2023-03-30 RX ADMIN — SODIUM CHLORIDE, SODIUM LACTATE, POTASSIUM CHLORIDE, CALCIUM CHLORIDE: 600; 310; 30; 20 INJECTION, SOLUTION INTRAVENOUS at 12:44

## 2023-03-30 RX ADMIN — Medication 100 MCG: at 14:06

## 2023-03-30 RX ADMIN — LIDOCAINE HYDROCHLORIDE 40 MG: 20 INJECTION, SOLUTION INFILTRATION; PERINEURAL at 13:35

## 2023-03-30 ASSESSMENT — ENCOUNTER SYMPTOMS: ORTHOPNEA: 0

## 2023-03-30 ASSESSMENT — COPD QUESTIONNAIRES: COPD: 0

## 2023-03-30 ASSESSMENT — LIFESTYLE VARIABLES: TOBACCO_USE: 0

## 2023-03-30 NOTE — ANESTHESIA POSTPROCEDURE EVALUATION
Patient: Natali Nicholson    Procedure: Procedure(s):  RELEASE, CARPAL TUNNEL RIGHT       Anesthesia Type:  MAC    Note:  Disposition: Outpatient   Postop Pain Control: Uneventful            Sign Out: Well controlled pain   PONV: No   Neuro/Psych: Uneventful            Sign Out: Acceptable/Baseline neuro status   Airway/Respiratory: Uneventful            Sign Out: Acceptable/Baseline resp. status   CV/Hemodynamics: Uneventful            Sign Out: Acceptable CV status; No obvious hypovolemia; No obvious fluid overload   Other NRE:    DID A NON-ROUTINE EVENT OCCUR?            Last vitals:  Vitals Value Taken Time   /72 03/30/23 1451   Temp 36.3  C (97.3  F) 03/30/23 1451   Pulse     Resp 16 03/30/23 1451   SpO2 98 % 03/30/23 1451       Electronically Signed By: Zoe Pastrana MD  March 30, 2023  3:41 PM

## 2023-03-30 NOTE — DISCHARGE INSTRUCTIONS
Procedure Performed: Right Carpal Tunnel Release  Attending Surgeon: Chung Thomas MD  Date: 3/30/2023    DIAGNOSIS  1. Bilateral carpal tunnel syndrome        MEDICATIONS   Resume all home medications as directed unless otherwise instructed during this hospitalization. If there is any question, double check with your primary care provider.  Start new discharge medications as directed.    Take 1 tablet of 650 mg Tylenol (acetaminophen) Arthritis Strength (extended release) and 1 tablet of Aleve (naproxen) 220 mg in the morning with breakfast and in the evening with dinner.     For breakthrough pain use narcotic pain medication as prescribed.    Do not drive or operate machinery while taking narcotic pain medications.   If you are taking other Tylenol containing medicines at home, be sure NOT to exceed 4 gram's (4000 milligrams) of Tylenol per day.   If you are taking pain medications, be sure to take Colace (docusate sodium) as well to prevent constipation. If constipated, try adding another cathartic or enema.  If nausea and vomiting, call the hospital or seek medical attention.    ACTIVITY   Weight bearing: Non-weight bearing to arm.    DIET  Resume same diet prior to your hospital admission.    WOUND   Leave dressing on until you are seen in clinic for your follow up visit.   Watch for signs and symptoms of infection of your wounds including; pain, redness, swelling, drainage or fever.  If you notice any of these symptoms please call or seek medical attention.    Keep wound clean, dry, and intact.  Do not submerge wounds in water until they are healed. No baths, soaking, swimming, or prolonged water exposure for 4 weeks after surgery.    RETURN   Follow-up with Orthopedic Clinic as directed.     Future Appointments   Date Time Provider Department Center   4/11/2023 10:20 AM Chung Thomas MD [facility] [place]       Call the [facility] at [phone] during business hours for any symptoms such  as:    * Fevers with Temperature greater than 101.5 degrees.   * Pus drainage from wound site.   * Severe pain, not controlled by medication.   * Persistent nausea, vomiting and inablility to tolerate fluids.    If you are receiving care in [place], you may call the Orthopedic clinic at [phone].    FOR URGENT PROBLEMS ONLY, after hours or on weekends call the hospital  at [phone] and ask to speak with the orthopedic resident on call.    You may also be seen at the [facility] that runs 6 days per week from 8a-5p Monday-Friday and 8a-12p on Saturday at [address] [place] [id number]. You can call the Walk-In Clinic at [phone].  [facility]  Home Care Following Anesthesia  For 24 hours after surgery:  Get plenty of rest.  A responsible adult must stay with you for at least 24 hours after you leave the surgery center.  Do not drive or use heavy equipment.  If you have weakness or tingling, don't drive or use heavy equipment until this feeling goes away.   Do not drink alcohol.   Avoid strenuous or risky activities.  Ask for help when climbing stairs.  You may feel lightheaded.  IF so, sit for a few minutes before standing.  Have someone help you get up.   If you have nausea (feel sick to your stomach): Drink only clear liquids such as apple juice, ginger ale, broth or 7-Up.  Rest may also help.  Be sure to drink enough fluids.  Move to a regular diet as you feel able.   You may have a slight fever.  Call the doctor if your fever is over 100 F (37.7 C) (taken under the tongue) or lasts longer than 24 hours.  You may have a dry mouth, a sore throat, muscle aches or trouble sleeping. These should go away after 24 hours.  Do not make important or legal decisions.   It is recommended to avoid smoking.               Tips for taking pain medications  To get the best pain relief possible, remember these points:  Take pain  medications as directed, before pain becomes severe.  Pain medication can upset your stomach: taking it with food may help.  Constipation is a common side effect of pain medication. Drink plenty of  fluids.  Eat foods high in fiber. Take a stool softener if recommended by your doctor or pharmacist.  Do not drink alcohol, drive or operate machinery while taking pain medications.  Ask about other ways to control pain, such as with heat, ice or relaxation.    Tylenol/Acetaminophen Consumption  To help encourage the safe use of acetaminophen, the makers of TYLENOL  have lowered the maximum daily dose for single-ingredient Extra Strength TYLENOL  (acetaminophen) products sold in the [place] from 8 pills per day (4,000 mg) to 6 pills per day (3,000 mg). The dosing interval has also changed from 2 pills every 4-6 hours to 2 pills every 6 hours.  If you feel your pain relief is insufficient, you may take Tylenol/Acetaminophen in addition to your narcotic pain medication.   Be careful not to exceed 3,000 mg of Tylenol/Acetaminophen in a 24 hour period from all sources.  If you are taking extra strength Tylenol/acetaminophen (500 mg), the maximum dose is 6 tablets in 24 hours.  If you are taking regular strength acetaminophen (325 mg), the maximum dose is 9 tablets in 24 hours.   Last tylenol given: 12:45 PM    Next Due: 6:45 PM    Call a doctor for any of the following:  Signs of infection (fever, growing tenderness at the surgery site, a large amount of drainage or bleeding, severe pain, foul-smelling drainage, redness, swelling).  It has been over 8 to 10 hours since surgery and you are still not able to urinate (pass water).  Headache for over 24 hours.  Numbness, tingling or weakness the day after surgery (if you had spinal anesthesia).  Signs of Covid-19 infection (temperature over 100 degrees, shortness of breath, cough, loss of taste/smell, generalized body aches, persistent headache, chills, sore throat,  nausea/vomiting/diarrhea)  Your doctor is:       Dr. Chung Thomas, Orthopaedics: [phone]               Or dial [phone] and ask for the resident on call for:  Orthopaedics  For emergency care, call the:  [facility] Emergency Department: [phone] (TTY for hearing impaired: [phone])

## 2023-03-30 NOTE — ANESTHESIA CARE TRANSFER NOTE
Patient: Natali Nicholson    Procedure: Procedure(s):  RELEASE, CARPAL TUNNEL RIGHT       Diagnosis: Right carpal tunnel syndrome [G56.01]  Diagnosis Additional Information: No value filed.    Anesthesia Type:   MAC     Note:    Oropharynx: oropharynx clear of all foreign objects and spontaneously breathing  Level of Consciousness: awake  Oxygen Supplementation: room air    Independent Airway: airway patency satisfactory and stable  Dentition: dentition unchanged  Vital Signs Stable: post-procedure vital signs reviewed and stable  Report to RN Given: handoff report given  Patient transferred to: Phase II    Handoff Report: Identifed the Patient, Identified the Reponsible Provider, Reviewed the pertinent medical history, Discussed the surgical course, Reviewed Intra-OP anesthesia mangement and issues during anesthesia, Set expectations for post-procedure period and Allowed opportunity for questions and acknowledgement of understanding      Vitals:  Vitals Value Taken Time   BP     Temp     Pulse     Resp     SpO2         Electronically Signed By: JES Solano CRNA  March 30, 2023  2:20 PM

## 2023-03-30 NOTE — OP NOTE
Patient: Natali Nicholson  : 1970  MRN: [id number]    DATE OF OPERATION: 2023      OPERATIVE REPORT       PREOPERATIVE DIAGNOSIS:  Right carpal tunnel syndrome     POSTOPERATIVE DIAGNOSIS:  Right carpal tunnel syndrome     PROCEDURE:  Right open carpal tunnel release    SURGEON:  Chung Thomas MD     ASSISTANT(S):  Merlin Limon MD    ANESTHESIA:  Local + MAC  Local: 10cc 1% lidocaine 1:100,000 epinephrine     IMPLANTS:   None    ESTIMATED BLOOD LOSS:  1cc    DVT PROPHYLAXIS:  SCDs    TOURNIQUET TIME:  12 minutes    SPECIMENS REMOVED:  None    INTRAOPERATIVE FINDINGS:  Compressed median nerve at the carpal tunnel. Recurrent motor branch extraligamentous and radial    COMPLICATIONS:  None    DISPOSITION:  Stable to PACU.     INDICATIONS:  Natali Nicholson is a 52 year old female with a history of right hand numbness and tingling refractory to conservative measures. Electrodiagnostic studies demonstrated evidence of carpal tunnel syndrome.     Indications for surgery were discussed with the patient in detail. After discussing risks, benefits and alternatives to procedure, the patient elected to proceed with surgical intervention.     The patient understood that risks include, but are not limited to: Bleeding, infection, damage to surrounding structures (such as nerve, vessel or tendon), persistent symptoms or numbness, need for additional procedures, pillar pain, stiffness, need for therapy, and anesthetic complications. The patient expressed understanding and agreement and wished to proceed.     Consent was obtained for the procedure.     DESCRIPTION OF PROCEDURE IN DETAIL:  The patient was seen in the preoperative care unit. Patient identity, consent, procedure to be performed, and operative site were verified with the patient. The right upper extremity was marked.     The patient was brought to the operating room and placed supine on the operating room table. The right upper extremity was placed on an  armboard. SCDs were placed on the bilateral lower extremities.      Sedation was administered by anesthesia.      The right upper extremity was prepped and draped in the usual sterile fashion. A timeout was performed confirming the correct patient, procedure, and operative site. All were in agreement.    The skin over the carpal tunnel was cleansed with chlorhexidine and local anesthetic (10cc of 1% lidocaine 1:100,000 epinephrine) was infiltrated in the skin and subcutaneous tissues over the carpal tunnel. The limb was exsanguinated and tourniquet inflated to 250 mmHg.    A standard longitudinal 2.5cm incision was made in the skin directly over the carpal tunnel.  Blunt dissection was carried down through the skin and subcutaneous tissues to the superficial palmar fascia.  Hemostasis was achieved with bipolar cautery.  The superficial palmar fascia was divided sharply exposing the transverse carpal ligament. The transverse carpal ligament was then divided sharply and released in its entirety, exposing the median nerve which appeared pale and compressed. Distally, the superficial palmar fascia was divided with scissors under direct visualization, protecting the superficial palmar arch below. Attention was then turned proximally, and the remaining transverse carpal ligament and antebrachial fascia were released under direct visualization. A full median nerve release was performed. The median nerve had excellent excursion at the end of the case. The recurrent motor branch was noted to be extraligamentous and radial.    The wound was copiously irrigated. Tourniquet was deflated, hemostasis was assured. The incision was then closed with interrupted, buried 4-0 Monocryl sutures. A steri-strip was applied. A sterile, bulky soft dressing was placed.     All needle and sponge counts were correct at the end of the procedure. There were no complications.     The patient was awoken from anesthesia and taken to the  postoperative recovery unit in stable condition.     I was present and scrubbed for the entire procedure.     POSTOPERATIVE PLAN:  The patient will be discharged home. The patient was instructed to be non-weight bearing. The patient was instructed on finger range of motion exercises. The dressing will remain in place until follow-up. The patient was instructed to keep it clean and dry. The patient will return to clinic in 10 days for a wound check.    Chung Thomas MD     Hand, Upper Extremity & Microvascular Surgery  Department of Orthopedic Surgery  [facility]

## 2023-03-30 NOTE — ANESTHESIA PREPROCEDURE EVALUATION
Anesthesia Pre-Procedure Evaluation    Patient: Natali Nicholson   MRN: [id number] : 1970        Procedure : Procedure(s):  RELEASE, CARPAL TUNNEL RIGHT          Past Medical History:   Diagnosis Date     ASCUS on Pap smear     + HPV HR     Brachial neuritis or radiculitis NOS     LUE C7 level     CTS (carpal tunnel syndrome) 10/3/2011     History of colposcopy with cervical biopsy 10/22/09,10/16/12    neg,neg     HTN (hypertension)     no cardioloigist     Hyperlipidemia LDL goal <100       Hypothyroid      Lumbago 10/07    Mild degenerative disc disease at L5-S1     Mild major depression (H) 2018     Obesity      Papanicolaou smear of cervix with low grade squamous intraepithelial lesion (LGSIL) 10/18/10     Recurrent major depression (H)      Type 2 diabetes mellitus without complication  (goal A1C<7) 10/24/2015      Past Surgical History:   Procedure Laterality Date     DISCECTOMY, FUSION CERVICAL ANTERIOR TWO LEVELS, COMBINED N/A 2017    Procedure: COMBINED DISCECTOMY, FUSION CERVICAL ANTERIOR TWO LEVELS;  C4-6 Anterior Cervical Discectomy and Fusion ;  Surgeon: Bharathi Salgado MD;  Location:  OR     [facility] NONSPECIFIC PROCEDURE  10/99    wisdom teeth extraction      No Known Allergies   Social History     Tobacco Use     Smoking status: Never     Smokeless tobacco: Never   Substance Use Topics     Alcohol use: No      Wt Readings from Last 1 Encounters:   23 94.1 kg (207 lb 8 oz)        Anesthesia Evaluation   Pt has had prior anesthetic. Type: General.    No history of anesthetic complications       ROS/MED HX  ENT/Pulmonary:    (-) tobacco use, asthma, COPD and recent URI   Neurologic:       Cardiovascular:     (+) hypertension-range: 120/80/ ---- (-) DUMONT, orthopnea/PND and syncope   METS/Exercise Tolerance: >4 METS    Hematologic:       Musculoskeletal:       GI/Hepatic:     (+) GERD (occasional with certain foods. Denies any symptoms today. ),      Renal/Genitourinary:       Endo:     (+) type II DM, Last HgA1c: 7.3, date: 2/23, thyroid problem (TSH wnl 3/2023), hypothyroidism,     Psychiatric/Substance Use:       Infectious Disease:       Malignancy:       Other:            Physical Exam    Airway        Mallampati: II   TM distance: > 3 FB   Neck ROM: full   Mouth opening: > 3 cm    Respiratory Devices and Support         Dental     Comment: Upper dentures. 1 missing lower tooth. Denies loose teeth.      B=Bridge, C=Chipped, L=Loose, M=Missing    Cardiovascular          Rhythm and rate: regular and normal     Pulmonary           breath sounds clear to auscultation           OUTSIDE LABS:  CBC:   Lab Results   Component Value Date    WBC 8.4 09/12/2017    WBC 8.5 08/10/2007    HGB 13.3 09/12/2017    HGB 13.8 08/10/2007    HCT 40.8 09/12/2017    HCT 41.1 08/10/2007     09/12/2017     08/10/2007     BMP:   Lab Results   Component Value Date     03/17/2023     02/08/2023    POTASSIUM 4.2 03/17/2023    POTASSIUM 4.3 02/08/2023    CHLORIDE 104 03/17/2023    CHLORIDE 103 02/08/2023    CO2 20 (L) 03/17/2023    CO2 24 02/08/2023    BUN 13.1 03/17/2023    BUN 13.6 02/08/2023    CR 0.86 03/17/2023    CR 0.79 02/08/2023     (H) 03/17/2023     (H) 02/08/2023     COAGS:   Lab Results   Component Value Date    PTT 28 08/10/2007    INR 1.02 08/10/2007     POC:   Lab Results   Component Value Date     (H) 09/21/2017    HCG Negative 09/20/2017     HEPATIC:   Lab Results   Component Value Date    ALBUMIN 4.4 03/17/2023    PROTTOTAL 7.9 03/17/2023    ALT 25 03/17/2023    AST 21 03/17/2023    ALKPHOS 72 03/17/2023    BILITOTAL 0.3 03/17/2023     OTHER:   Lab Results   Component Value Date    A1C 7.3 (H) 02/08/2023    ZOHRA 9.6 03/17/2023    TSH 1.92 03/17/2023    T4 0.95 02/08/2023    SED 11 03/16/2021       Anesthesia Plan    ASA Status:  2   NPO Status:  NPO Appropriate    Anesthesia Type: MAC.     - Reason for MAC: straight local  not clinically adequate              Consents    Anesthesia Plan(s) and associated risks, benefits, and realistic alternatives discussed. Questions answered and patient/representative(s) expressed understanding.    - Discussed:     - Discussed with:  Patient         Postoperative Care            Comments:    Other Comments: Discussed plan for MAC, including possibility of awareness, risk of aspiration pneumonia, risk of hypoxia/low oxygen/airway obstruction requiring additional airway support/devices. Discussed alternatives. Discussed backup plan of general anesthesia and risks of general anesthesia, including sore throat/hoarse voice, abrasions/damage to lips/tongue/teeth, nausea, rare complications (including medication reactions, cardiac, pulmonary, recall). Ensured understanding, invited questions and all questions were answered.       H&P reviewed: Unable to attach H&P to encounter due to EHR limitations. H&P Update: appropriate H&P reviewed, patient examined. No interval changes since H&P (within 30 days).         Zoe Pastrana MD

## 2023-04-11 ENCOUNTER — OFFICE VISIT (OUTPATIENT)
Dept: ORTHOPEDICS | Facility: CLINIC | Age: 53
End: 2023-04-11
Payer: COMMERCIAL

## 2023-04-11 DIAGNOSIS — G56.01 RIGHT CARPAL TUNNEL SYNDROME: Primary | ICD-10-CM

## 2023-04-11 PROCEDURE — 99024 POSTOP FOLLOW-UP VISIT: CPT | Performed by: STUDENT IN AN ORGANIZED HEALTH CARE EDUCATION/TRAINING PROGRAM

## 2023-04-11 NOTE — PROGRESS NOTES
Orthopaedic Surgery Hand Clinic Progress Note    Patient Name: Natali Nicholson  MRN: [id number]  : 1970  Date: 2023    Date of Surgery: 3/30/23    Surgery Performed: Right carpal tunnel release     Subjective:  Ms. Natali Nicholson is a 52 year old female who returns s/p right open carpal  tunnel release DOS 3/30. Patient states that her hand is little itchy but otherwise is good. Very mild intermittent tingling. Nocturnal bothersome symptoms have completely resolved.     Objective:  LMP 2023 (Exact Date)     General: alert, appropriate, NAD  HEENT: NC/AT  CV: RRR by pulse  Pulm: Unlabored on RA  MSK:  Incision clean, dry, intact  No erythema, drainage, evidence of infection  5 out of 5 EPL, FPL, APB, hand intrinsics  Sensation intact to light touch median, radial, ulnar nerve distributions  Warm well-perfused, capillary fill less than 2 seconds      Assessment/Plan:  52-year-old female status post right open carpal tunnel release 3/30/2023.  Progressing appropriately.    New Steri-Strip applied.  Okay to wash hands, use gentle soap and water, pat dry.  No soaking, submerging, prolonged water exposure x4 weeks    No heavy lifting, twisting, gripping for another 2 to 3 weeks until wound is fully healed.  She was counseled on gentle anger and wrist range of motion exercises.    All questions answered.  She voiced understanding and agreement.  Follow-up with me as needed.    Chung Thomas MD    Hand & Upper Extremity Surgery  Department of Orthopedic Surgery  [facility]

## 2023-04-11 NOTE — LETTER
2023         RE: Natali Nicholson  [address]  [facility] [address]        Dear Colleague,    Thank you for referring your patient, Natali Nicholson, to the [facility] [place]. Please see a copy of my visit note below.    Orthopaedic Surgery Hand Clinic Progress Note    Patient Name: Natali Nicholson  MRN: [id number]  : 1970  Date: 2023    Date of Surgery: 3/30/23    Surgery Performed: Right carpal tunnel release     Subjective:  Ms. Natali Nicholson is a 52 year old female who returns s/p right open carpal  tunnel release DOS 3/30. Patient states that her hand is little itchy but otherwise is good. Very mild intermittent tingling. Nocturnal bothersome symptoms have completely resolved.     Objective:  LMP 2023 (Exact Date)     General: alert, appropriate, NAD  HEENT: NC/AT  CV: RRR by pulse  Pulm: Unlabored on RA  MSK:  Incision clean, dry, intact  No erythema, drainage, evidence of infection  5 out of 5 EPL, FPL, APB, hand intrinsics  Sensation intact to light touch median, radial, ulnar nerve distributions  Warm well-perfused, capillary fill less than 2 seconds      Assessment/Plan:  52-year-old female status post right open carpal tunnel release 3/30/2023.  Progressing appropriately.    New Steri-Strip applied.  Okay to wash hands, use gentle soap and water, pat dry.  No soaking, submerging, prolonged water exposure x4 weeks    No heavy lifting, twisting, gripping for another 2 to 3 weeks until wound is fully healed.  She was counseled on gentle anger and wrist range of motion exercises.    All questions answered.  She voiced understanding and agreement.  Follow-up with me as needed.    Chung Thomas MD    Hand & Upper Extremity Surgery  Department of Orthopedic Surgery  [facility]        Again, thank you for allowing me to participate in the care of your patient.        Sincerely,        Chung Thomas MD

## 2023-04-19 DIAGNOSIS — E11.9 TYPE 2 DIABETES MELLITUS WITHOUT COMPLICATION, WITHOUT LONG-TERM CURRENT USE OF INSULIN (H): ICD-10-CM

## 2023-04-19 DIAGNOSIS — I10 ESSENTIAL HYPERTENSION WITH GOAL BLOOD PRESSURE LESS THAN 130/80: ICD-10-CM

## 2023-04-19 RX ORDER — LISINOPRIL 40 MG/1
TABLET ORAL
Qty: 90 TABLET | Refills: 3 | OUTPATIENT
Start: 2023-04-19

## 2023-04-19 NOTE — TELEPHONE ENCOUNTER
Upon chart review, both medications were refilled on 2/10/23 for one year supply.     Will refused refill request to the pharmacy as the pharmacy should have new scripts on file for the patient.     Anamika Hollingsworth RN

## 2023-08-26 ENCOUNTER — HEALTH MAINTENANCE LETTER (OUTPATIENT)
Age: 53
End: 2023-08-26

## 2023-09-20 ENCOUNTER — LAB (OUTPATIENT)
Dept: LAB | Facility: CLINIC | Age: 53
End: 2023-09-20
Payer: COMMERCIAL

## 2023-09-20 ENCOUNTER — ANCILLARY PROCEDURE (OUTPATIENT)
Dept: MAMMOGRAPHY | Facility: CLINIC | Age: 53
End: 2023-09-20
Payer: COMMERCIAL

## 2023-09-20 DIAGNOSIS — Z12.31 VISIT FOR SCREENING MAMMOGRAM: ICD-10-CM

## 2023-09-20 DIAGNOSIS — E11.9 TYPE 2 DIABETES MELLITUS WITHOUT COMPLICATION, WITHOUT LONG-TERM CURRENT USE OF INSULIN (H): ICD-10-CM

## 2023-09-20 DIAGNOSIS — Z12.31 ENCOUNTER FOR SCREENING MAMMOGRAM FOR BREAST CANCER: ICD-10-CM

## 2023-09-20 LAB — HBA1C MFR BLD: 6.7 % (ref 0–5.6)

## 2023-09-20 PROCEDURE — 83036 HEMOGLOBIN GLYCOSYLATED A1C: CPT

## 2023-09-20 PROCEDURE — 77067 SCR MAMMO BI INCL CAD: CPT | Mod: TC | Performed by: STUDENT IN AN ORGANIZED HEALTH CARE EDUCATION/TRAINING PROGRAM

## 2023-09-20 PROCEDURE — 36415 COLL VENOUS BLD VENIPUNCTURE: CPT

## 2024-01-11 ENCOUNTER — PATIENT OUTREACH (OUTPATIENT)
Dept: CARE COORDINATION | Facility: CLINIC | Age: 54
End: 2024-01-11
Payer: COMMERCIAL

## 2024-01-15 NOTE — PROGRESS NOTES
Orthopaedic Surgery Hand Clinic Progress Note    Patient Name: Natali Nicholson  MRN: [id number]  : 1970  Date: 2024    Date of Surgery: 3/30/23    Surgery Performed: Right carpal tunnel release     Subjective:    Update 24 : Patient reports today for consult of her left wrist.  She had right CTR 3/30/23 and did very well with no residual issues. Patient states she is ready to talk about left carpal tunnel release. Has had symptoms for many years, same as on the right, now waking her up. She has tried bracing as well for the left wrist.    Objective:  There were no vitals taken for this visit.    General: alert, appropriate, NAD  HEENT: NC/AT  CV: RRR by pulse  Pulm: Unlabored on RA  MSK:  RUE:   Incision well healed    LUE:  Mild thenar atrophy  5 out of 5 EPL, FPL, APB, hand intrinsics  Sensation intact to light touch median, radial, ulnar nerve distributions  +Tinel's and Durkan's at the carpal tunnel on the left  Warm well-perfused, capillary fill less than 2 seconds    EMG/NCS 2018     Prolonged distal onset latency bilateral median motor nerves.  Prolonged distal peak latency left median sensory nerve.  No response of right median sensory nerve.  Increased 2+ motor unit amplitude left APB.  Right APB increased 1+ motor unit amplitude and reduced recruitment.  Findings consistent with severe bilateral median neuropathies at the wrists consistent with carpal tunnel syndrome.  Worse on the right    HbA1c 6.7 23     I have personally reviewed the above images and labs.     Assessment/Plan:  53-year-old female with left carpal tunnel syndrome.    Symptoms refractory to bracing.  She did excellent from a right carpal tunnel release and wishes to proceed with the same on the left.    The indications for surgery were discussed with the patient. The benefits, risks, and alternatives of operative management were discussed in detail with the patient. The patient understands that the risks  of surgery include, but are not limited to: infection, bleeding, injury to nearby structures (such as nerves, blood vessels, and tendons), pillar pain, wound healing problems, temporary diminished  strength, need for additional surgery, pain, stiffness, scarring, need for rehabilitation, and anesthetic complications.  Patient expressed understanding and elected to proceed with surgery. All questions were answered to the patient's satisfaction.    Case request placed, local plus MAC.    Chung Thomas MD    Hand & Upper Extremity Surgery  Department of Orthopedic Surgery  [facility]

## 2024-01-16 ENCOUNTER — TELEPHONE (OUTPATIENT)
Dept: ORTHOPEDICS | Facility: CLINIC | Age: 54
End: 2024-01-16

## 2024-01-16 ENCOUNTER — OFFICE VISIT (OUTPATIENT)
Dept: ORTHOPEDICS | Facility: CLINIC | Age: 54
End: 2024-01-16
Payer: COMMERCIAL

## 2024-01-16 VITALS — BODY MASS INDEX: 32.82 KG/M2 | HEIGHT: 65 IN | WEIGHT: 197 LBS

## 2024-01-16 DIAGNOSIS — G56.02 LEFT CARPAL TUNNEL SYNDROME: Primary | ICD-10-CM

## 2024-01-16 PROCEDURE — 99214 OFFICE O/P EST MOD 30 MIN: CPT | Performed by: STUDENT IN AN ORGANIZED HEALTH CARE EDUCATION/TRAINING PROGRAM

## 2024-01-16 NOTE — LETTER
2024         RE: Natali Nicholson  [address]  [facility] [address]        Dear Colleague,    Thank you for referring your patient, Natali Nicholson, to the [facility] [place]. Please see a copy of my visit note below.    Orthopaedic Surgery Hand Clinic Progress Note    Patient Name: Natali Nicholson  MRN: [id number]  : 1970  Date: 2024    Date of Surgery: 3/30/23    Surgery Performed: Right carpal tunnel release     Subjective:    Update 24 : Patient reports today for consult of her left wrist.  She had right CTR 3/30/23 and did very well with no residual issues. Patient states she is ready to talk about left carpal tunnel release. Has had symptoms for many years, same as on the right, now waking her up. She has tried bracing as well for the left wrist.    Objective:  There were no vitals taken for this visit.    General: alert, appropriate, NAD  HEENT: NC/AT  CV: RRR by pulse  Pulm: Unlabored on RA  MSK:  RUE:   Incision well healed    LUE:  Mild thenar atrophy  5 out of 5 EPL, FPL, APB, hand intrinsics  Sensation intact to light touch median, radial, ulnar nerve distributions  +Tinel's and Durkan's at the carpal tunnel on the left  Warm well-perfused, capillary fill less than 2 seconds    EMG/NCS 2018     Prolonged distal onset latency bilateral median motor nerves.  Prolonged distal peak latency left median sensory nerve.  No response of right median sensory nerve.  Increased 2+ motor unit amplitude left APB.  Right APB increased 1+ motor unit amplitude and reduced recruitment.  Findings consistent with severe bilateral median neuropathies at the wrists consistent with carpal tunnel syndrome.  Worse on the right    HbA1c 6.7 23     I have personally reviewed the above images and labs.     Assessment/Plan:  53-year-old female with left carpal tunnel syndrome.    Symptoms refractory to bracing.  She did excellent from a right carpal  tunnel release and wishes to proceed with the same on the left.    The indications for surgery were discussed with the patient. The benefits, risks, and alternatives of operative management were discussed in detail with the patient. The patient understands that the risks of surgery include, but are not limited to: infection, bleeding, injury to nearby structures (such as nerves, blood vessels, and tendons), pillar pain, wound healing problems, temporary diminished  strength, need for additional surgery, pain, stiffness, scarring, need for rehabilitation, and anesthetic complications.  Patient expressed understanding and elected to proceed with surgery. All questions were answered to the patient's satisfaction.    Case request placed, local plus MAC.    Chung Thomas MD    Hand & Upper Extremity Surgery  Department of Orthopedic Surgery  [facility]      Again, thank you for allowing me to participate in the care of your patient.        Sincerely,        Chung Thomas MD

## 2024-01-16 NOTE — TELEPHONE ENCOUNTER
Patient has been scheduled for surgery. Details are below.    Date of Surgery: 02/22/24    Approximate Arrival Time: SURGERY CENTER WILL CALL 3/4 DAYS PRIOR TO CONFIRM A TIME   Surgeon:  DR. CHADWICK DAWKINS     Procedure: RELEASE CARPAL TUNNEL, LEFT  Location: [facility] [address]  Surgery Consult: NA  PreOp Physical: 02/16/24  PostOp: 02/29/24  Packet Mailed/MyChart Sent: YES  Added to [place]: YES

## 2024-01-25 ENCOUNTER — PATIENT OUTREACH (OUTPATIENT)
Dept: CARE COORDINATION | Facility: CLINIC | Age: 54
End: 2024-01-25
Payer: COMMERCIAL

## 2024-01-26 ENCOUNTER — TRANSFERRED RECORDS (OUTPATIENT)
Dept: HEALTH INFORMATION MANAGEMENT | Facility: CLINIC | Age: 54
End: 2024-01-26
Payer: COMMERCIAL

## 2024-01-26 LAB — RETINOPATHY: NEGATIVE

## 2024-02-01 ENCOUNTER — TELEPHONE (OUTPATIENT)
Dept: INTERNAL MEDICINE | Facility: CLINIC | Age: 54
End: 2024-02-01
Payer: COMMERCIAL

## 2024-02-01 DIAGNOSIS — E03.9 HYPOTHYROIDISM, UNSPECIFIED TYPE: Primary | ICD-10-CM

## 2024-02-01 DIAGNOSIS — E11.9 TYPE 2 DIABETES MELLITUS WITHOUT COMPLICATION, WITHOUT LONG-TERM CURRENT USE OF INSULIN (H): ICD-10-CM

## 2024-02-01 DIAGNOSIS — E78.5 HYPERLIPIDEMIA LDL GOAL <100: ICD-10-CM

## 2024-02-01 DIAGNOSIS — I10 ESSENTIAL HYPERTENSION WITH GOAL BLOOD PRESSURE LESS THAN 130/80: ICD-10-CM

## 2024-02-01 RX ORDER — BLOOD SUGAR DIAGNOSTIC
STRIP MISCELLANEOUS
Qty: 100 STRIP | Refills: 11 | Status: SHIPPED | OUTPATIENT
Start: 2024-02-01

## 2024-02-02 NOTE — TELEPHONE ENCOUNTER
Pt has [employer] but has not been reading messages recently. Has appt with me 2/16/24 to follow-up on diabetes, etc. I would like pt to have a fasting blood and urine lab 1 week prior to appt with me so I can review lab results with pt when seen. Labs ordered. Assist pt with scheduling appt. Also tell pt to check blood sugars twice a day (before breakfast and bedtime) for 4 days prior to the appointment with me, write them down and have them available to review with the appointment. Rx for strips also sent to mail order pharmacy if doesn't have so should receive in time to check sugars

## 2024-02-02 NOTE — TELEPHONE ENCOUNTER
Called patient and advised her of message below, she will call back when she has a calendar to schedule lab appt.

## 2024-02-07 ENCOUNTER — LAB (OUTPATIENT)
Dept: LAB | Facility: CLINIC | Age: 54
End: 2024-02-07
Payer: COMMERCIAL

## 2024-02-07 DIAGNOSIS — E78.5 HYPERLIPIDEMIA LDL GOAL <100: ICD-10-CM

## 2024-02-07 DIAGNOSIS — I10 ESSENTIAL HYPERTENSION WITH GOAL BLOOD PRESSURE LESS THAN 130/80: ICD-10-CM

## 2024-02-07 DIAGNOSIS — E11.9 TYPE 2 DIABETES MELLITUS WITHOUT COMPLICATION, WITHOUT LONG-TERM CURRENT USE OF INSULIN (H): ICD-10-CM

## 2024-02-07 DIAGNOSIS — E03.9 HYPOTHYROIDISM, UNSPECIFIED TYPE: ICD-10-CM

## 2024-02-07 LAB
ERYTHROCYTE [DISTWIDTH] IN BLOOD BY AUTOMATED COUNT: 12.2 % (ref 10–15)
HBA1C MFR BLD: 6.5 % (ref 0–5.6)
HCT VFR BLD AUTO: 42.4 % (ref 35–47)
HGB BLD-MCNC: 13.8 G/DL (ref 11.7–15.7)
MCH RBC QN AUTO: 28.3 PG (ref 26.5–33)
MCHC RBC AUTO-ENTMCNC: 32.5 G/DL (ref 31.5–36.5)
MCV RBC AUTO: 87 FL (ref 78–100)
PLATELET # BLD AUTO: 296 10E3/UL (ref 150–450)
RBC # BLD AUTO: 4.88 10E6/UL (ref 3.8–5.2)
WBC # BLD AUTO: 5.7 10E3/UL (ref 4–11)

## 2024-02-07 PROCEDURE — 82570 ASSAY OF URINE CREATININE: CPT

## 2024-02-07 PROCEDURE — 83036 HEMOGLOBIN GLYCOSYLATED A1C: CPT

## 2024-02-07 PROCEDURE — 84443 ASSAY THYROID STIM HORMONE: CPT

## 2024-02-07 PROCEDURE — 36415 COLL VENOUS BLD VENIPUNCTURE: CPT

## 2024-02-07 PROCEDURE — 85027 COMPLETE CBC AUTOMATED: CPT

## 2024-02-07 PROCEDURE — 82043 UR ALBUMIN QUANTITATIVE: CPT

## 2024-02-07 PROCEDURE — 80053 COMPREHEN METABOLIC PANEL: CPT

## 2024-02-07 PROCEDURE — 80061 LIPID PANEL: CPT

## 2024-02-08 LAB
ALBUMIN SERPL BCG-MCNC: 4.5 G/DL (ref 3.5–5.2)
ALP SERPL-CCNC: 59 U/L (ref 40–150)
ALT SERPL W P-5'-P-CCNC: 20 U/L (ref 0–50)
ANION GAP SERPL CALCULATED.3IONS-SCNC: 9 MMOL/L (ref 7–15)
AST SERPL W P-5'-P-CCNC: 21 U/L (ref 0–45)
BILIRUB SERPL-MCNC: 0.5 MG/DL
BUN SERPL-MCNC: 10.1 MG/DL (ref 6–20)
CALCIUM SERPL-MCNC: 9.6 MG/DL (ref 8.6–10)
CHLORIDE SERPL-SCNC: 103 MMOL/L (ref 98–107)
CHOLEST SERPL-MCNC: 138 MG/DL
CREAT SERPL-MCNC: 0.82 MG/DL (ref 0.51–0.95)
CREAT UR-MCNC: 157 MG/DL
DEPRECATED HCO3 PLAS-SCNC: 25 MMOL/L (ref 22–29)
EGFRCR SERPLBLD CKD-EPI 2021: 86 ML/MIN/1.73M2
FASTING STATUS PATIENT QL REPORTED: YES
GLUCOSE SERPL-MCNC: 110 MG/DL (ref 70–99)
HDLC SERPL-MCNC: 42 MG/DL
LDLC SERPL CALC-MCNC: 80 MG/DL
MICROALBUMIN UR-MCNC: <12 MG/L
MICROALBUMIN/CREAT UR: NORMAL MG/G{CREAT}
NONHDLC SERPL-MCNC: 96 MG/DL
POTASSIUM SERPL-SCNC: 4.7 MMOL/L (ref 3.4–5.3)
PROT SERPL-MCNC: 7.9 G/DL (ref 6.4–8.3)
SODIUM SERPL-SCNC: 137 MMOL/L (ref 135–145)
TRIGL SERPL-MCNC: 78 MG/DL
TSH SERPL DL<=0.005 MIU/L-ACNC: 1.55 UIU/ML (ref 0.3–4.2)

## 2024-02-16 ENCOUNTER — OFFICE VISIT (OUTPATIENT)
Dept: INTERNAL MEDICINE | Facility: CLINIC | Age: 54
End: 2024-02-16
Payer: COMMERCIAL

## 2024-02-16 VITALS
SYSTOLIC BLOOD PRESSURE: 112 MMHG | TEMPERATURE: 97.8 F | BODY MASS INDEX: 33.41 KG/M2 | DIASTOLIC BLOOD PRESSURE: 70 MMHG | HEART RATE: 68 BPM | RESPIRATION RATE: 18 BRPM | OXYGEN SATURATION: 100 % | WEIGHT: 200.5 LBS | HEIGHT: 65 IN

## 2024-02-16 DIAGNOSIS — G56.02 CARPAL TUNNEL SYNDROME OF LEFT WRIST: ICD-10-CM

## 2024-02-16 DIAGNOSIS — Z23 NEED FOR COVID-19 VACCINE: ICD-10-CM

## 2024-02-16 DIAGNOSIS — E03.9 HYPOTHYROIDISM, UNSPECIFIED TYPE: ICD-10-CM

## 2024-02-16 DIAGNOSIS — Z23 NEED FOR INFLUENZA VACCINATION: ICD-10-CM

## 2024-02-16 DIAGNOSIS — E11.9 TYPE 2 DIABETES MELLITUS WITHOUT COMPLICATION, WITHOUT LONG-TERM CURRENT USE OF INSULIN (H): ICD-10-CM

## 2024-02-16 DIAGNOSIS — E78.5 HYPERLIPIDEMIA LDL GOAL <100: ICD-10-CM

## 2024-02-16 DIAGNOSIS — F32.0 MAJOR DEPRESSIVE DISORDER, SINGLE EPISODE, MILD (H): ICD-10-CM

## 2024-02-16 DIAGNOSIS — I10 ESSENTIAL HYPERTENSION WITH GOAL BLOOD PRESSURE LESS THAN 130/80: ICD-10-CM

## 2024-02-16 DIAGNOSIS — Z01.818 PREOPERATIVE EXAMINATION: ICD-10-CM

## 2024-02-16 PROCEDURE — 90471 IMMUNIZATION ADMIN: CPT | Performed by: INTERNAL MEDICINE

## 2024-02-16 PROCEDURE — 99214 OFFICE O/P EST MOD 30 MIN: CPT | Mod: 25 | Performed by: INTERNAL MEDICINE

## 2024-02-16 PROCEDURE — 91320 SARSCV2 VAC 30MCG TRS-SUC IM: CPT | Performed by: INTERNAL MEDICINE

## 2024-02-16 PROCEDURE — 90686 IIV4 VACC NO PRSV 0.5 ML IM: CPT | Performed by: INTERNAL MEDICINE

## 2024-02-16 PROCEDURE — 90480 ADMN SARSCOV2 VAC 1/ONLY CMP: CPT | Performed by: INTERNAL MEDICINE

## 2024-02-16 RX ORDER — LISINOPRIL 40 MG/1
TABLET ORAL
Qty: 90 TABLET | Refills: 3 | Status: SHIPPED | OUTPATIENT
Start: 2024-02-16 | End: 2024-08-09

## 2024-02-16 RX ORDER — ATORVASTATIN CALCIUM 40 MG/1
40 TABLET, FILM COATED ORAL DAILY
Qty: 90 TABLET | Refills: 3 | Status: SHIPPED | OUTPATIENT
Start: 2024-02-16 | End: 2024-08-09

## 2024-02-16 RX ORDER — DULAGLUTIDE 3 MG/.5ML
3 INJECTION, SOLUTION SUBCUTANEOUS WEEKLY
Qty: 6 ML | Refills: 3 | Status: SHIPPED | OUTPATIENT
Start: 2024-02-16 | End: 2024-05-20

## 2024-02-16 RX ORDER — DULOXETIN HYDROCHLORIDE 30 MG/1
CAPSULE, DELAYED RELEASE ORAL
Qty: 180 CAPSULE | Refills: 3 | Status: SHIPPED | OUTPATIENT
Start: 2024-02-16 | End: 2024-08-09

## 2024-02-16 RX ORDER — LEVOTHYROXINE SODIUM 112 UG/1
112 TABLET ORAL DAILY
Qty: 90 TABLET | Refills: 3 | Status: SHIPPED | OUTPATIENT
Start: 2024-02-16 | End: 2024-08-09

## 2024-02-16 ASSESSMENT — PATIENT HEALTH QUESTIONNAIRE - PHQ9
SUM OF ALL RESPONSES TO PHQ QUESTIONS 1-9: 0
SUM OF ALL RESPONSES TO PHQ QUESTIONS 1-9: 0

## 2024-02-16 ASSESSMENT — PAIN SCALES - GENERAL: PAINLEVEL: NO PAIN (0)

## 2024-02-16 NOTE — PROGRESS NOTES
Preoperative Evaluation  [address] [address]  Phone: [phone]  Primary Provider: Alex Velazquez  Pre-op Performing Provider: ALEX VELAZQUEZ  Feb 16, 2024       Natali is a 53 year old, presenting for the following:  Pre-Op Exam        2/16/2024     8:23 AM   Additional Questions   Roomed by Trice HER CMA     Surgical Information  Surgery/Procedure: RELEASE, CARPAL TUNNEL LEFT   Sur[place] [facility] [place]  Surgeon: Chung Thomas MD   Surgery Date: 2/22/24  Time of Surgery: 11:25 am  Where patient plans to recover: At home with family  Fax number for surgical facility: Note does not need to be faxed, will be available electronically in Epic.    Assessment & Plan     The proposed surgical procedure is considered LOW risk.    Preoperative examination  Appears medically stable to proceed with surgery as scheduled    Carpal tunnel syndrome of left wrist  Symptomatic.  Has failed conservative treatment    Essential hypertension with goal blood pressure less than 130/80  Controlled.  Continue current medication.  Trulicity will be held 1 week prior to surgery to reduce risk for nausea and restarted afterwards  - lisinopril (ZESTRIL) 40 MG tablet; TAKE 1 TABLET DAILY (DOSE  CHANGE)    Type 2 diabetes mellitus without complication, without long-term current use of insulin (H)  Controlled.  A1c 6.5.  Continue current medication and repeat labs 6 months  - Hemoglobin A1c; Future  - Comprehensive metabolic panel; Future  - Albumin Random Urine Quantitative with Creat Ratio; Future  - Hemoglobin A1c; Future  - Dulaglutide (TRULICITY) 3 MG/0.5ML SOPN; Inject 3 mg Subcutaneous once a week  - metFORMIN (GLUCOPHAGE) 1000 MG tablet; TAKE 1 TABLET TWICE A DAY  WITH BREAKFAST AND DINNER    Hypothyroidism, unspecified type  Controlled.  Continue current medication.  Repeat lab 1 year  - TSH with free T4 reflex; Future  - levothyroxine (SYNTHROID/LEVOTHROID) 112 MCG  tablet; Take 1 tablet (112 mcg) by mouth daily    Major depressive disorder, single episode, mild (H24)  Controlled.  PHQ-9 = 0.  Continue current medication  - DULoxetine (CYMBALTA) 30 MG capsule; TAKE 1 CAPSULE TWICE DAILY    HYPERLIPIDEMIA LDL GOAL <100  Controlled.  Continue current medication.  Repeat lab 1 year  - Comprehensive metabolic panel; Future  - Lipid panel reflex to direct LDL Fasting; Future  - atorvastatin (LIPITOR) 40 MG tablet; Take 1 tablet (40 mg) by mouth daily    Need for COVID-19 vaccine  - COVID-19 12+ (2023-24) (PFIZER)    Need for influenza vaccination  - INFLUENZA VACCINE >6 MONTHS (AFLURIA/FLUZONE)              Risks and Recommendations  The patient has the following additional risks and recommendations for perioperative complications:   - No identified additional risk factors other than previously addressed    Patient instructions   Approval given to proceed with proposed procedure, without further diagnostic evaluation..  No solid food after midnight prior to your  procedure. May have clear liquids up to 2 hours prior to the  procedure (9:25am)  May use Tylenol for pain control  between now and your procedure   Stop Trulicity 1 week prior to surgery and  then start  after surgery as usual  Take  Duloxetine and Levothyroxine with  water to swallow the medication when you first get up the  AM of the  procedure prior to 9:00am  Hold other prescription and over the counter medications/supplements the day of the  procedure. May resume usual medications/supplements after the procedure unless instructed otherwise by your surgeon/proceduralist   Repeat nonfasting A1C lab in 6 months. Repeat fasting labs in 1 year   Healthcare  Directive discussed and given copy.   Patient to complete and return to clinic   Get tetanus (Td) and Prevnar 20 (pneumonia risk reduction) vaccinations at pharmacy in  3-4 weeks    Recommendation  APPROVAL GIVEN to proceed with proposed procedure, without further  diagnostic evaluation.         Subjective       HPI related to upcoming procedure:   History bilateral carpal tunnel impingement symptoms.  Underwent surgery on the right wrist 1 year ago with excellent response.  Has ongoing left wrist and symptoms with pain and some numbness.  Has failed conservative therapy.  Presents for clearance to undergo surgical management.  Regarding current exercise tolerance, patient walks 2-3 blocks frequently without symptoms of chest pain or shortness of breath.  See below for other medical issues.  Diabetes now well-controlled with A1c 6.5.          2/16/2024     8:22 AM   Preop Questions   1. Have you ever had a heart attack or stroke? No   2. Have you ever had surgery on your heart or blood vessels, such as a stent placement, a coronary artery bypass, or surgery on an artery in your head, neck, heart, or legs? No   3. Do you have chest pain with activity? No   4. Do you have a history of  heart failure? No   5. Do you currently have a cold, bronchitis or symptoms of other infection? No   6. Do you have a cough, shortness of breath, or wheezing? No   7. Do you or anyone in your family have previous history of blood clots? No   8. Do you or does anyone in your family have a serious bleeding problem such as prolonged bleeding following surgeries or cuts? No   9. Have you ever had problems with anemia or been told to take iron pills? No   10. Have you had any abnormal blood loss such as black, tarry or bloody stools, or abnormal vaginal bleeding? No   11. Have you ever had a blood transfusion? No   12. Are you willing to have a blood transfusion if it is medically needed before, during, or after your surgery? Yes   13. Have you or any of your relatives ever had problems with anesthesia? No   14. Do you have sleep apnea, excessive snoring or daytime drowsiness? No   15. Do you have any artifical heart valves or other implanted medical devices like a pacemaker, defibrillator, or  continuous glucose monitor? No   16. Do you have artificial joints? No   17. Are you allergic to latex? No   18. Is there any chance that you may be pregnant? No       Health Care Directive  Patient does not have a Health Care Directive or Living Will: Discussed advance care planning with patient; information given to patient to review.    Preoperative Review of    reviewed - Had 3 tabs Oxycodone 5mg on 3/30/23 after prior right CTS surgery. None since then       Status of Chronic Conditions:  See assessment/plan section above for active medical problems.  Problems all longstanding and stable, except as noted/documented.  See ROS in addition for pertinent symptoms related to these conditions.      Patient Active Problem List    Diagnosis Date Noted    Left carpal tunnel syndrome 01/16/2024     Priority: Medium    Class 1 obesity with serious comorbidity and body mass index (BMI) of 34.0 to 34.9 in adult, unspecified obesity type 03/18/2023     Priority: Medium    Mild major depression (H) 04/13/2018     Priority: Medium    S/P cervical spinal fusion 09/20/2017     Priority: Medium    Hypothyroidism, unspecified type 08/14/2017     Priority: Medium    HGSIL on Pap smear of cervix 12/13/2016     Priority: Medium     06/02/08 NL pap  07/10/09 ASCUS pap, +HPV HR, pt. Is to schedule a colp  10/22/09 colp done-neg, pt. Is to have a repeat pap in one year.  10/18/10 LSIL cannot exclude HGSIL, pt. Is to schedule a colp.  10/16/12 colp done-neg, pt. Is to have a repeat pap in one year.  11/08/16 NL pap, neg HPV, pt. Is to have co-testing in three year, HM and HX updated,tracking has been started  2/10/23 NIL pap, Neg HPV.  Plan cotest in 3 years      Type 2 diabetes mellitus without complication, without long-term current use of insulin (H) 11/08/2016     Priority: Medium    Essential hypertension with goal blood pressure less than 130/80 10/04/2016     Priority: Medium    CTS (carpal tunnel syndrome) 10/03/2011      Priority: Medium    HYPERLIPIDEMIA LDL GOAL <100 10/31/2010     Priority: Medium      Past Medical History:   Diagnosis Date    ASCUS on Pap smear 11/09    + HPV HR    Brachial neuritis or radiculitis NOS 9/03    LUE C7 level    CTS (carpal tunnel syndrome) 10/3/2011    History of colposcopy with cervical biopsy 10/22/09,10/16/12    neg,neg    HTN (hypertension)     no cardioloigist    Hyperlipidemia LDL goal <100      Hypothyroid     Lumbago 10/07    Mild degenerative disc disease at L5-S1    Mild major depression (H24) 4/13/2018    Obesity     Papanicolaou smear of cervix with low grade squamous intraepithelial lesion (LGSIL) 10/18/10    Recurrent major depression (H24)     Type 2 diabetes mellitus without complication  (goal A1C<7) 10/24/2015     Past Surgical History:   Procedure Laterality Date    DISCECTOMY, FUSION CERVICAL ANTERIOR TWO LEVELS, COMBINED N/A 9/20/2017    Procedure: COMBINED DISCECTOMY, FUSION CERVICAL ANTERIOR TWO LEVELS;  C4-6 Anterior Cervical Discectomy and Fusion ;  Surgeon: Bharathi Salgado MD;  Location: [facility] OR    RELEASE CARPAL TUNNEL Right 3/30/2023    Procedure: RELEASE, CARPAL TUNNEL RIGHT;  Surgeon: Chung Thomas MD;  Location: [facility] OR    [facility] NONSPECIFIC PROCEDURE  10/99    wisdom teeth extraction     Current Outpatient Medications   Medication Sig Dispense Refill    atorvastatin (LIPITOR) 40 MG tablet Take 1 tablet (40 mg) by mouth daily 90 tablet 3    blood glucose (ONETOUCH VERIO IQ) test strip Use to test blood sugars 2 times daily or as directed. 100 strip 11    blood glucose monitoring (ONE TOUCH DELICA) lancets Use to test blood sugars 1 times daily or as directed. Reference #[id number] 1 Box 3    blood glucose monitoring (ONETOUCH VERIO) meter device kit Use to test blood sugar 1 times daily or as directed.  Reference #[id number] 1 kit 0    Dulaglutide (TRULICITY) 3 MG/0.5ML SOPN Inject 3 mg Subcutaneous once a week 6 mL 3    DULoxetine (CYMBALTA) 30 MG capsule TAKE 1  CAPSULE TWICE DAILY 180 capsule 3    levothyroxine (SYNTHROID/LEVOTHROID) 112 MCG tablet Take 1 tablet (112 mcg) by mouth daily 90 tablet 3    lisinopril (ZESTRIL) 40 MG tablet TAKE 1 TABLET DAILY (DOSE  CHANGE) 90 tablet 3    metFORMIN (GLUCOPHAGE) 1000 MG tablet TAKE 1 TABLET TWICE A DAY  WITH BREAKFAST AND DINNER 180 tablet 3    thin (NO BRAND SPECIFIED) lancets Use to test blood sugar 2 times daily or as directed. 100 each 11       No Known Allergies     Social History     Tobacco Use    Smoking status: Never    Smokeless tobacco: Never   Substance Use Topics    Alcohol use: No     Family History   Problem Relation Age of Onset    Heart Disease Mother          age 48    Diabetes Mother     Diabetes Father     Psychotic Disorder Father         Schizophrenia    Allergies Brother     Cancer Maternal Grandmother         Cervical cancer    Depression Maternal Aunt      History   Drug Use No         Review of Systems    Review of Systems  CONSTITUTIONAL: NEGATIVE for fever, chills.  Weight down 8 pounds in the last year  INTEGUMENTARY/SKIN: NEGATIVE for worrisome rashes, moles or lesions  EYES: NEGATIVE for vision changes or irritation  ENT/MOUTH: NEGATIVE for ear, mouth and throat problems  RESP: NEGATIVE for significant cough or SOB  BREAST: NEGATIVE for masses, tenderness or discharge  CV: NEGATIVE for chest pain, palpitations or peripheral edema  GI: NEGATIVE for nausea, abdominal pain, heartburn, or change in bowel habits  : NEGATIVE for frequency, dysuria, or hematuria  MUSCULOSKELETAL: NEGATIVE for significant arthralgias or myalgia except for some soreness left wrist  NEURO: NEGATIVE for weakness, dizziness. POSITIVE for numbness/tingling in left median nerve distribution with CTS  ENDOCRINE: NEGATIVE for temperature intolerance.  Diabetes well-controlled.  Recent morning blood sugars 94, 101, 91, 88.  Evening blood sugars 143, 134, 116  HEME: NEGATIVE for bleeding problems.  On lipid and thyroid  "management  PSYCHIATRIC: NEGATIVE for changes in mood or affect with duloxetine        Objective    /70   Pulse 68   Temp 97.8  F (36.6  C) (Tympanic)   Resp 18   Ht 1.651 m (5' 5\")   Wt 90.9 kg (200 lb 8 oz)   LMP 02/07/2024 (Exact Date)   SpO2 100%   Breastfeeding No   BMI 33.36 kg/m     Estimated body mass index is 32.78 kg/m  as calculated from the following:    Height as of 1/16/24: 1.651 m (5' 5\").    Weight as of 1/16/24: 89.4 kg (197 lb).  Physical Exam  Eye: PERRL, EOMI  HENT: ear canals and TM's normal and nose and mouth without ulcers or lesions   Neck: no adenopathy. Thyroid normal to palpation. No bruits  Pulm: Lungs clear to auscultation   CV: Regular rates and rhythm  GI: Soft, mildly obese, nontender, Normal active bowel sounds, No hepatosplenomegaly or masses palpable  Ext: Peripheral pulses intact. No edema.  Neuro: Normal strength and tone, sensory exam grossly normal except for mildly reduced light touch sensation left median nerve distribution      Recent Labs   Lab Test 02/07/24  1022 09/20/23  0926 03/17/23  0923   HGB 13.8  --   --      --   --      --  137   POTASSIUM 4.7  --  4.2   CR 0.82  --  0.86   A1C 6.5* 6.7*  --         Diagnostics  No labs were ordered for  this visit.   No EKG required for low risk surgery      Revised Cardiac Risk Index (RCRI)  The patient has the following serious cardiovascular risks for perioperative complications:   - No serious cardiac risks = 0 points     RCRI Interpretation: 0 points: Class I (very low risk - 0.4% complication rate)         Signed Electronically by: Pablo Gomez MD  Copy of this evaluation report is provided to requesting physician.         "

## 2024-02-16 NOTE — PATIENT INSTRUCTIONS
Approval given to proceed with proposed procedure, without further diagnostic evaluation..  No solid food after midnight prior to your  procedure. May have clear liquids up to 2 hours prior to the  procedure (9:25am)  May use Tylenol for pain control  between now and your procedure   Stop Trulicity 1 week prior to surgery and  then start  after surgery as usual  Take  Duloxetine and Levothyroxine with  water to swallow the medication when you first get up the  AM of the  procedure prior to 9:00am  Hold other prescription and over the counter medications/supplements the day of the  procedure. May resume usual medications/supplements after the procedure unless instructed otherwise by your surgeon/proceduralist   Repeat nonfasting A1C lab in 6 months. Repeat fasting labs in 1 year   Healthcare  Directive discussed and given copy.   Patient to complete and return to clinic   Get tetanus (Td) and Prevnar 20 (pneumonia risk reduction) vaccinations at pharmacy in  3-4 weeks

## 2024-02-19 DIAGNOSIS — E11.9 TYPE 2 DIABETES MELLITUS WITHOUT COMPLICATION, WITHOUT LONG-TERM CURRENT USE OF INSULIN (H): ICD-10-CM

## 2024-02-20 RX ORDER — DULAGLUTIDE 3 MG/.5ML
3 INJECTION, SOLUTION SUBCUTANEOUS WEEKLY
Qty: 6 ML | Refills: 3 | OUTPATIENT
Start: 2024-02-20

## 2024-02-21 ENCOUNTER — ANESTHESIA EVENT (OUTPATIENT)
Dept: SURGERY | Facility: AMBULATORY SURGERY CENTER | Age: 54
End: 2024-02-21
Payer: COMMERCIAL

## 2024-02-22 ENCOUNTER — ANESTHESIA (OUTPATIENT)
Dept: SURGERY | Facility: AMBULATORY SURGERY CENTER | Age: 54
End: 2024-02-22
Payer: COMMERCIAL

## 2024-02-22 ENCOUNTER — HOSPITAL ENCOUNTER (OUTPATIENT)
Facility: AMBULATORY SURGERY CENTER | Age: 54
Discharge: HOME | End: 2024-02-22
Attending: STUDENT IN AN ORGANIZED HEALTH CARE EDUCATION/TRAINING PROGRAM | Admitting: STUDENT IN AN ORGANIZED HEALTH CARE EDUCATION/TRAINING PROGRAM
Payer: COMMERCIAL

## 2024-02-22 VITALS
SYSTOLIC BLOOD PRESSURE: 148 MMHG | TEMPERATURE: 96.9 F | RESPIRATION RATE: 12 BRPM | HEIGHT: 65 IN | DIASTOLIC BLOOD PRESSURE: 86 MMHG | BODY MASS INDEX: 33.32 KG/M2 | OXYGEN SATURATION: 96 % | WEIGHT: 200 LBS | HEART RATE: 74 BPM

## 2024-02-22 DIAGNOSIS — G56.02 LEFT CARPAL TUNNEL SYNDROME: Primary | ICD-10-CM

## 2024-02-22 LAB
GLUCOSE BLDC GLUCOMTR-MCNC: 91 MG/DL (ref 70–99)
HCG UR QL: NEGATIVE
INTERNAL QC OK POCT: NORMAL
POCT KIT EXPIRATION DATE: NORMAL
POCT KIT LOT NUMBER: NORMAL

## 2024-02-22 PROCEDURE — 29848 WRIST ENDOSCOPY/SURGERY: CPT | Performed by: NURSE ANESTHETIST, CERTIFIED REGISTERED

## 2024-02-22 PROCEDURE — 81025 URINE PREGNANCY TEST: CPT | Performed by: PATHOLOGY

## 2024-02-22 PROCEDURE — 64721 CARPAL TUNNEL SURGERY: CPT | Mod: LT

## 2024-02-22 PROCEDURE — 29848 WRIST ENDOSCOPY/SURGERY: CPT | Performed by: ANESTHESIOLOGY

## 2024-02-22 PROCEDURE — 82962 GLUCOSE BLOOD TEST: CPT | Performed by: PATHOLOGY

## 2024-02-22 RX ORDER — ONDANSETRON 4 MG/1
4 TABLET, ORALLY DISINTEGRATING ORAL EVERY 30 MIN PRN
Status: DISCONTINUED | OUTPATIENT
Start: 2024-02-22 | End: 2024-02-23 | Stop reason: HOSPADM

## 2024-02-22 RX ORDER — PROPOFOL 10 MG/ML
INJECTION, EMULSION INTRAVENOUS CONTINUOUS PRN
Status: DISCONTINUED | OUTPATIENT
Start: 2024-02-22 | End: 2024-02-22

## 2024-02-22 RX ORDER — ONDANSETRON 2 MG/ML
INJECTION INTRAMUSCULAR; INTRAVENOUS PRN
Status: DISCONTINUED | OUTPATIENT
Start: 2024-02-22 | End: 2024-02-22

## 2024-02-22 RX ORDER — DEXAMETHASONE SODIUM PHOSPHATE 4 MG/ML
INJECTION, SOLUTION INTRA-ARTICULAR; INTRALESIONAL; INTRAMUSCULAR; INTRAVENOUS; SOFT TISSUE PRN
Status: DISCONTINUED | OUTPATIENT
Start: 2024-02-22 | End: 2024-02-22

## 2024-02-22 RX ORDER — OXYCODONE HYDROCHLORIDE 5 MG/1
5 TABLET ORAL
Status: DISCONTINUED | OUTPATIENT
Start: 2024-02-22 | End: 2024-02-23 | Stop reason: HOSPADM

## 2024-02-22 RX ORDER — MAGNESIUM HYDROXIDE 1200 MG/15ML
LIQUID ORAL PRN
Status: DISCONTINUED | OUTPATIENT
Start: 2024-02-22 | End: 2024-02-22 | Stop reason: HOSPADM

## 2024-02-22 RX ORDER — NALOXONE HYDROCHLORIDE 0.4 MG/ML
0.1 INJECTION, SOLUTION INTRAMUSCULAR; INTRAVENOUS; SUBCUTANEOUS
Status: DISCONTINUED | OUTPATIENT
Start: 2024-02-22 | End: 2024-02-23 | Stop reason: HOSPADM

## 2024-02-22 RX ORDER — IBUPROFEN 600 MG/1
600 TABLET, FILM COATED ORAL EVERY 6 HOURS PRN
Qty: 30 TABLET | Refills: 0 | Status: SHIPPED | OUTPATIENT
Start: 2024-02-22 | End: 2024-08-29

## 2024-02-22 RX ORDER — ONDANSETRON 2 MG/ML
4 INJECTION INTRAMUSCULAR; INTRAVENOUS EVERY 30 MIN PRN
Status: DISCONTINUED | OUTPATIENT
Start: 2024-02-22 | End: 2024-02-23 | Stop reason: HOSPADM

## 2024-02-22 RX ORDER — LIDOCAINE 40 MG/G
CREAM TOPICAL
Status: DISCONTINUED | OUTPATIENT
Start: 2024-02-22 | End: 2024-02-23 | Stop reason: HOSPADM

## 2024-02-22 RX ORDER — LIDOCAINE HYDROCHLORIDE AND EPINEPHRINE 10; 10 MG/ML; UG/ML
10 INJECTION, SOLUTION INFILTRATION; PERINEURAL ONCE
Status: DISCONTINUED | OUTPATIENT
Start: 2024-02-22 | End: 2024-02-23 | Stop reason: HOSPADM

## 2024-02-22 RX ORDER — ACETAMINOPHEN 500 MG
500-1000 TABLET ORAL EVERY 6 HOURS PRN
Qty: 30 TABLET | Refills: 0 | Status: SHIPPED | OUTPATIENT
Start: 2024-02-22 | End: 2024-08-29

## 2024-02-22 RX ORDER — OXYCODONE HYDROCHLORIDE 5 MG/1
5 TABLET ORAL EVERY 6 HOURS PRN
Qty: 3 TABLET | Refills: 0 | Status: SHIPPED | OUTPATIENT
Start: 2024-02-22 | End: 2024-02-25

## 2024-02-22 RX ORDER — ACETAMINOPHEN 325 MG/1
975 TABLET ORAL ONCE
Status: COMPLETED | OUTPATIENT
Start: 2024-02-22 | End: 2024-02-22

## 2024-02-22 RX ORDER — SODIUM CHLORIDE, SODIUM LACTATE, POTASSIUM CHLORIDE, CALCIUM CHLORIDE 600; 310; 30; 20 MG/100ML; MG/100ML; MG/100ML; MG/100ML
INJECTION, SOLUTION INTRAVENOUS CONTINUOUS
Status: DISCONTINUED | OUTPATIENT
Start: 2024-02-22 | End: 2024-02-23 | Stop reason: HOSPADM

## 2024-02-22 RX ORDER — LIDOCAINE HYDROCHLORIDE AND EPINEPHRINE 10; 10 MG/ML; UG/ML
INJECTION, SOLUTION INFILTRATION; PERINEURAL PRN
Status: DISCONTINUED | OUTPATIENT
Start: 2024-02-22 | End: 2024-02-22 | Stop reason: HOSPADM

## 2024-02-22 RX ORDER — LIDOCAINE HYDROCHLORIDE 20 MG/ML
INJECTION, SOLUTION INFILTRATION; PERINEURAL PRN
Status: DISCONTINUED | OUTPATIENT
Start: 2024-02-22 | End: 2024-02-22

## 2024-02-22 RX ORDER — KETOROLAC TROMETHAMINE 30 MG/ML
INJECTION, SOLUTION INTRAMUSCULAR; INTRAVENOUS PRN
Status: DISCONTINUED | OUTPATIENT
Start: 2024-02-22 | End: 2024-02-22

## 2024-02-22 RX ORDER — OXYCODONE HYDROCHLORIDE 5 MG/1
10 TABLET ORAL
Status: DISCONTINUED | OUTPATIENT
Start: 2024-02-22 | End: 2024-02-23 | Stop reason: HOSPADM

## 2024-02-22 RX ADMIN — PROPOFOL 150 MCG/KG/MIN: 10 INJECTION, EMULSION INTRAVENOUS at 13:12

## 2024-02-22 RX ADMIN — ONDANSETRON 4 MG: 2 INJECTION INTRAMUSCULAR; INTRAVENOUS at 13:22

## 2024-02-22 RX ADMIN — SODIUM CHLORIDE, SODIUM LACTATE, POTASSIUM CHLORIDE, CALCIUM CHLORIDE: 600; 310; 30; 20 INJECTION, SOLUTION INTRAVENOUS at 11:42

## 2024-02-22 RX ADMIN — DEXAMETHASONE SODIUM PHOSPHATE 4 MG: 4 INJECTION, SOLUTION INTRA-ARTICULAR; INTRALESIONAL; INTRAMUSCULAR; INTRAVENOUS; SOFT TISSUE at 13:18

## 2024-02-22 RX ADMIN — KETOROLAC TROMETHAMINE 30 MG: 30 INJECTION, SOLUTION INTRAMUSCULAR; INTRAVENOUS at 13:46

## 2024-02-22 RX ADMIN — LIDOCAINE HYDROCHLORIDE 100 MG: 20 INJECTION, SOLUTION INFILTRATION; PERINEURAL at 13:12

## 2024-02-22 RX ADMIN — ACETAMINOPHEN 975 MG: 325 TABLET ORAL at 11:40

## 2024-02-22 NOTE — OP NOTE
Patient: Naatli Nicholson  : 1970  MRN: [id number]    DATE OF OPERATION: 2024      OPERATIVE REPORT       PREOPERATIVE DIAGNOSIS:  Left carpal tunnel syndrome     POSTOPERATIVE DIAGNOSIS:  Left carpal tunnel syndrome     PROCEDURE:  Left open carpal tunnel release    SURGEON:  Chung Thomas MD     ASSISTANT(S):  Chung Thomas MD    ANESTHESIA:  Local + MAC  (10cc 1% lidocaine 1:100,000 epinephrine)     IMPLANTS:   None    ESTIMATED BLOOD LOSS:  1cc    DVT PROPHYLAXIS:  SCDs    TOURNIQUET TIME:  9 minutes    SPECIMENS REMOVED:  None    INTRAOPERATIVE FINDINGS:  Compressed median nerve at the carpal tunnel. Recurrent motor branch extraligamentous and radial    COMPLICATIONS:  None    DISPOSITION:  Stable to PACU.     INDICATIONS:  Natali Nicholson is a 53 year old female with a history of left hand numbness and tingling in the median nerve distribution refractory to conservative measures. Electrodiagnostic studies demonstrated evidence of carpal tunnel syndrome.     Indications for surgery were discussed with the patient in detail. After discussing risks, benefits and alternatives to procedure, the patient elected to proceed with surgical intervention.     The patient understood that risks include, but are not limited to: Bleeding, infection, damage to surrounding structures (such as nerve, vessel or tendon), persistent symptoms or numbness, need for additional procedures, pillar pain, stiffness, need for therapy, and anesthetic complications. The patient expressed understanding and agreement and wished to proceed.     Consent was obtained for the procedure.     DESCRIPTION OF PROCEDURE IN DETAIL:  The patient was seen in the preoperative care unit. Patient identity, consent, procedure to be performed, and operative site were verified with the patient. The left upper extremity was marked.     The patient was brought to the operating room and placed supine on the operating room table. The left upper  extremity was placed on an armboard. SCDs were placed on the bilateral lower extremities.      Sedation was administered by anesthesia.    The left upper extremity was prepped and draped in the usual sterile fashion. A timeout was performed confirming the correct patient, procedure, and operative site. All were in agreement.    Local anesthetic (10cc of 1% lidocaine 1:100,000 epinephrine) was infiltrated in the skin and subcutaneous tissues over the carpal tunnel. Limb was exsanguinated and tourniquet inflated to 250 mmHg. A standard longitudinal 2.5cm incision was made in the skin directly over the carpal tunnel.  Blunt dissection was carried down through the skin and subcutaneous tissues to the superficial palmar fascia.  Hemostasis was achieved with bipolar cautery.  The superficial palmar fascia was divided sharply exposing the transverse carpal ligament. The transverse carpal ligament was then divided sharply and released in its entirety, exposing the median nerve which appeared pale and compressed. Distally, the superficial palmar fascia was divided with scissors under direct visualization, protecting the superficial palmar arch below. Attention was then turned proximally, and the remaining transverse carpal ligament and antebrachial fascia were released under direct visualization. A full median nerve release was performed. The median nerve had excellent excursion at the end of the case. The recurrent motor branch was noted to be extraligamentous and radial.    The wound was copiously irrigated. Tourniquet was deflated and hemostasis assured. The incision was then closed with interrupted, buried 4-0 Monocryl sutures. A steri-strip was applied. A sterile, bulky soft dressing was placed.     All needle and sponge counts were correct at the end of the procedure. There were no complications.     The patient was awoken from anesthesia and taken to the postoperative recovery unit in stable condition.     I was  present and scrubbed for the entire procedure.     POSTOPERATIVE PLAN:  The patient will be discharged home. The patient was instructed to be non-weight bearing. The patient was instructed on finger range of motion exercises. The dressing will remain in place until follow-up. The patient was instructed to keep it clean and dry. The patient will return to clinic in 10 days for a wound check.    Chung Thomas MD     Hand, Upper Extremity & Microvascular Surgery  Department of Orthopedic Surgery  [facility]

## 2024-02-22 NOTE — DISCHARGE INSTRUCTIONS
Procedure Performed: Carpal tunnel release  Attending Surgeon: Dr. Thomas  Date: 2/22/2024    DIAGNOSIS  1. Left carpal tunnel syndrome        MEDICATIONS   Resume all home medications as directed unless otherwise instructed during this hospitalization. If there is any question, double check with your primary care provider.  Start new discharge medications as directed.    Take 1 tablet of 650 mg Tylenol (acetaminophen) Arthritis Strength (extended release) and 1 tablet of Aleve (naproxen) 220 mg in the morning with breakfast and in the evening with dinner.     For breakthrough pain use narcotic pain medication as prescribed.    Do not drive or operate machinery while taking narcotic pain medications.   If you are taking other Tylenol containing medicines at home, be sure NOT to exceed 4 gram's (4000 milligrams) of Tylenol per day.   If you are taking pain medications, be sure to take Colace (docusate sodium) as well to prevent constipation. If constipated, try adding another cathartic or enema.  If nausea and vomiting, call the hospital or seek medical attention.    ACTIVITY   Weight bearing: Non-weight bearing to arm.    DIET  Resume same diet prior to your hospital admission.    WOUND   Leave dressing on until you are seen in clinic for your follow up visit.   Watch for signs and symptoms of infection of your wounds including; pain, redness, swelling, drainage or fever.  If you notice any of these symptoms please call or seek medical attention.    Keep wound clean, dry, and intact.  Do not submerge wounds in water until they are healed. No baths, soaking, swimming, or prolonged water exposure for 4 weeks after surgery.    RETURN   Follow-up with Orthopedic Clinic as directed.     Future Appointments   Date Time Provider Department Center   2/29/2024  9:40 AM Marcos Adams PA-C [facility]       Call the [facility] at [phone] during business hours for any symptoms such as:     * Fevers with Temperature greater than 101.5 degrees.   * Pus drainage from wound site.   * Severe pain, not controlled by medication.   * Persistent nausea, vomiting and inablility to tolerate fluids.    If you are receiving care in [place], you may call the Orthopedic clinic at [phone].    FOR URGENT PROBLEMS ONLY, after hours or on weekends call the hospital  at [phone] and ask to speak with the orthopedic resident on call.  [facility]  Home Care Following Anesthesia  For 24 hours after surgery:  Get plenty of rest.  A responsible adult must stay with you for at least 24 hours after you leave the surgery center.  Do not drive or use heavy equipment.  If you have weakness or tingling, don't drive or use heavy equipment until this feeling goes away.   Do not drink alcohol.   Avoid strenuous or risky activities.  Ask for help when climbing stairs.  You may feel lightheaded.  IF so, sit for a few minutes before standing.  Have someone help you get up.   If you have nausea (feel sick to your stomach): Drink only clear liquids such as apple juice, ginger ale, broth or 7-Up.  Rest may also help.  Be sure to drink enough fluids.  Move to a regular diet as you feel able.   You may have a slight fever.  Call the doctor if your fever is over 100 F (37.7 C) (taken under the tongue) or lasts longer than 24 hours.  You may have a dry mouth, a sore throat, muscle aches or trouble sleeping. These should go away after 24 hours.  Do not make important or legal decisions.   It is recommended to avoid smoking.               Tips for taking pain medications  To get the best pain relief possible, remember these points:  Take pain medications as directed, before pain becomes severe.  Pain medication can upset your stomach: taking it with food may help.  Constipation is a common side effect of pain medication. Drink plenty of  fluids.  Eat foods high in fiber. Take a stool  softener if recommended by your doctor or pharmacist.  Do not drink alcohol, drive or operate machinery while taking pain medications.  Ask about other ways to control pain, such as with heat, ice or relaxation.    Tylenol/Acetaminophen Consumption    If you feel your pain relief is insufficient, you may take Tylenol/Acetaminophen in addition to your narcotic pain medication.   Be careful not to exceed 4,000 mg of Tylenol/Acetaminophen in a 24 hour period from all sources.  If you are taking extra strength Tylenol/acetaminophen (500 mg), the maximum dose is 8 tablets in 24 hours.  If you are taking regular strength acetaminophen (325 mg), the maximum dose is 12 tablets in 24 hours.    Call a doctor for any of the following:  Signs of infection (fever, growing tenderness at the surgery site, a large amount of drainage or bleeding, severe pain, foul-smelling drainage, redness, swelling).  It has been over 8 to 10 hours since surgery and you are still not able to urinate (pass water).  Headache for over 24 hours.  Numbness, tingling or weakness the day after surgery (if you had spinal anesthesia).  Signs of Covid-19 infection (temperature over 100 degrees, shortness of breath, cough, loss of taste/smell, generalized body aches, persistent headache, chills, sore throat, nausea/vomiting/diarrhea)  Your doctor is:       Dr. Chung Thomas, Orthopaedics: [phone]               Or dial [phone] and ask for the resident on call for:  Orthopaedics  For emergency care, call the:  [facility] Emergency Department: [phone] (TTY for hearing impaired: [phone])

## 2024-02-22 NOTE — BRIEF OP NOTE
[facility] [place]    Brief Operative Note    Pre-operative diagnosis: Left carpal tunnel syndrome [G56.02]  Post-operative diagnosis Same as pre-operative diagnosis    Procedure: RELEASE, CARPAL TUNNEL LEFT, Left - Wrist    Surgeon: Surgeon(s) and Role:     * Chung Thomas MD - Primary     * Shanae Yusuf MD  Anesthesia: MAC with Local   Estimated Blood Loss: Minimal    Drains: None  Specimens: * No specimens in log *  Findings:   See full op report .  Complications: None.  Implants: * No implants in log *      Orthopedic Postoperative Plan:  - Activity: up ad denny. Minimize use of the operative hand  - Weight bearing status: Minimize use of the operative hand  - Antibiotics not indicated  - DVT ppx: Ambulation  - Pain control: local anesthetic injected at end of case. PRN ibuprofen and acetaminophen with 3 tabs oxycodone for breakthrough  - Dressing: Keep c/d/I until clinic  - Follow up: 2/29 with Peter Adams PA-C  - Dispo: Discharge to home per PACU standard.    Shanae Yusuf, PGY-4

## 2024-02-22 NOTE — ANESTHESIA PREPROCEDURE EVALUATION
Anesthesia Pre-Procedure Evaluation    Patient: Natali Nicholson   MRN: [id number] : 1970        Procedure : Procedure(s):  RELEASE, CARPAL TUNNEL LEFT          Past Medical History:   Diagnosis Date    ASCUS on Pap smear     + HPV HR    Brachial neuritis or radiculitis NOS     LUE C7 level    CTS (carpal tunnel syndrome) 10/3/2011    History of colposcopy with cervical biopsy 10/22/09,10/16/12    neg,neg    HTN (hypertension)     no cardioloigist    Hyperlipidemia LDL goal <100      Hypothyroid     Lumbago 10/07    Mild degenerative disc disease at L5-S1    Mild major depression (H24) 2018    Obesity     Papanicolaou smear of cervix with low grade squamous intraepithelial lesion (LGSIL) 10/18/10    Recurrent major depression (H24)     Type 2 diabetes mellitus without complication  (goal A1C<7) 10/24/2015      Past Surgical History:   Procedure Laterality Date    DISCECTOMY, FUSION CERVICAL ANTERIOR TWO LEVELS, COMBINED N/A 2017    Procedure: COMBINED DISCECTOMY, FUSION CERVICAL ANTERIOR TWO LEVELS;  C4-6 Anterior Cervical Discectomy and Fusion ;  Surgeon: Bharathi Salgado MD;  Location: [facility] OR    RELEASE CARPAL TUNNEL Right 3/30/2023    Procedure: RELEASE, CARPAL TUNNEL RIGHT;  Surgeon: Chung Thomas MD;  Location: [facility] OR    [facility] NONSPECIFIC PROCEDURE  10/99    wisdom teeth extraction      No Known Allergies   Social History     Tobacco Use    Smoking status: Never    Smokeless tobacco: Never   Substance Use Topics    Alcohol use: No      Wt Readings from Last 1 Encounters:   24 90.7 kg (200 lb)        Anesthesia Evaluation   Pt has had prior anesthetic. Type: General.        ROS/MED HX  ENT/Pulmonary:  - neg pulmonary ROS     Neurologic:  - neg neurologic ROS     Cardiovascular:     (+)  hypertension- -   -  - -                                      METS/Exercise Tolerance:     Hematologic:       Musculoskeletal:       GI/Hepatic:       Renal/Genitourinary:       Endo:      (+)  type II DM,        thyroid problem,     Obesity,       Psychiatric/Substance Use:     (+) psychiatric history depression       Infectious Disease:       Malignancy:       Other:            Physical Exam    Airway  airway exam normal           Respiratory Devices and Support         Dental       (+) Modest Abnormalities - crowns, retainers, 1 or 2 missing teeth      Cardiovascular   cardiovascular exam normal          Pulmonary   pulmonary exam normal                OUTSIDE LABS:  CBC:   Lab Results   Component Value Date    WBC 5.7 02/07/2024    WBC 8.4 09/12/2017    HGB 13.8 02/07/2024    HGB 13.3 09/12/2017    HCT 42.4 02/07/2024    HCT 40.8 09/12/2017     02/07/2024     09/12/2017     BMP:   Lab Results   Component Value Date     02/07/2024     03/17/2023    POTASSIUM 4.7 02/07/2024    POTASSIUM 4.2 03/17/2023    CHLORIDE 103 02/07/2024    CHLORIDE 104 03/17/2023    CO2 25 02/07/2024    CO2 20 (L) 03/17/2023    BUN 10.1 02/07/2024    BUN 13.1 03/17/2023    CR 0.82 02/07/2024    CR 0.86 03/17/2023    GLC 91 02/22/2024     (H) 02/07/2024     COAGS:   Lab Results   Component Value Date    PTT 28 08/10/2007    INR 1.02 08/10/2007     POC:   Lab Results   Component Value Date     (H) 09/21/2017    HCG Negative 02/22/2024     HEPATIC:   Lab Results   Component Value Date    ALBUMIN 4.5 02/07/2024    PROTTOTAL 7.9 02/07/2024    ALT 20 02/07/2024    AST 21 02/07/2024    ALKPHOS 59 02/07/2024    BILITOTAL 0.5 02/07/2024     OTHER:   Lab Results   Component Value Date    A1C 6.5 (H) 02/07/2024    ZOHRA 9.6 02/07/2024    TSH 1.55 02/07/2024    T4 0.95 02/08/2023    SED 11 03/16/2021       Anesthesia Plan    ASA Status:  3    NPO Status:  NPO Appropriate    Anesthesia Type: MAC.     - Reason for MAC: straight local not clinically adequate   Induction: Intravenous.   Maintenance: TIVA.        Consents    Anesthesia Plan(s) and associated risks, benefits, and realistic alternatives  "discussed. Questions answered and patient/representative(s) expressed understanding.     - Discussed: Risks, Benefits and Alternatives for BOTH SEDATION and the PROCEDURE were discussed     - Discussed with:  Patient            Postoperative Care    Pain management: Oral pain medications, Multi-modal analgesia.   PONV prophylaxis: Ondansetron (or other 5HT-3), Dexamethasone or Solumedrol     Comments:               Kaden Kahn MD, MD    I have reviewed the pertinent notes and labs in the chart from the past 30 days and (re)examined the patient.  Any updates or changes from those notes are reflected in this note.              # DMII: A1C = 6.5 % (Ref range: 0.0 - 5.6 %) within past 6 months  # Obesity: Estimated body mass index is 33.28 kg/m  as calculated from the following:    Height as of this encounter: 1.651 m (5' 5\").    Weight as of this encounter: 90.7 kg (200 lb).      "

## 2024-02-22 NOTE — ANESTHESIA CARE TRANSFER NOTE
Patient: Natali Nicholson    Procedure: Procedure(s):  RELEASE, CARPAL TUNNEL LEFT       Diagnosis: Left carpal tunnel syndrome [G56.02]  Diagnosis Additional Information: No value filed.    Anesthesia Type:   MAC     Note:    Oropharynx: oropharynx clear of all foreign objects and spontaneously breathing  Level of Consciousness: awake  Oxygen Supplementation: room air    Independent Airway: airway patency satisfactory and stable  Dentition: dentition unchanged  Vital Signs Stable: post-procedure vital signs reviewed and stable  Report to RN Given: handoff report given  Patient transferred to: Phase II    Handoff Report: Identifed the Patient, Identified the Reponsible Provider, Reviewed the pertinent medical history, Discussed the surgical course, Reviewed Intra-OP anesthesia mangement and issues during anesthesia, Set expectations for post-procedure period and Allowed opportunity for questions and acknowledgement of understanding      Vitals:  Vitals Value Taken Time   /65 02/22/24 1353   Temp 36.1  C (96.9  F) 02/22/24 1353   Pulse 80 02/22/24 1353   Resp 12 02/22/24 1353   SpO2 94 % 02/22/24 1353       Electronically Signed By: JES White CRNA  February 22, 2024  1:55 PM

## 2024-02-22 NOTE — ANESTHESIA POSTPROCEDURE EVALUATION
Patient: Natali Nicholson    Procedure: Procedure(s):  RELEASE, CARPAL TUNNEL LEFT       Anesthesia Type:  MAC    Note:  Disposition: Outpatient   Postop Pain Control: Uneventful            Sign Out: Well controlled pain   PONV: No   Neuro/Psych: Uneventful            Sign Out: Acceptable/Baseline neuro status   Airway/Respiratory: Uneventful            Sign Out: Acceptable/Baseline resp. status   CV/Hemodynamics: Uneventful            Sign Out: Acceptable CV status; No obvious hypovolemia; No obvious fluid overload   Other NRE: NONE   DID A NON-ROUTINE EVENT OCCUR? No           Last vitals:  Vitals Value Taken Time   /86 02/22/24 1419   Temp 36.1  C (96.9  F) 02/22/24 1419   Pulse 74 02/22/24 1419   Resp 12 02/22/24 1419   SpO2 96 % 02/22/24 1419       Electronically Signed By: ALEISHA BELLE MD  February 22, 2024  3:05 PM

## 2024-02-28 NOTE — PROGRESS NOTES
HISTORY OF PRESENT ILLNESS:    Natali Nicholson is a 53 year old female who is seen in follow up for left Carpal tunnel release.  Present symptoms: Pt reports significant improvement to CT symptoms.  Is keeping wrist covered. Pt is using hand for activities. No new complaints.  Denies Chest pain, Calve pain, Fever, Chills.    Current Treatment: Postop.    PHYSICAL EXAM:  There were no vitals taken for this visit.  There is no weight on file to calculate BMI.   GENERAL APPEARANCE: healthy, alert and no distress   PSYCH: mentation appears normal and affect normal/bright    MSK:  Left:  Volar wrist.  Incision clean and dry, subcutaneous Sutures present, healing.  No incisional erythema.   No Ecchymosis.  Edema mild at wrist, hand and digits.  CMS: joe incisional numbness, otherwise grossly intact to digits.  AROM: mild restriction in palmar wrist ext/ flexion, otherwise WNL.      ASSESSMENT:  Natali Nicholson is a 53 year old female  S/P Left open CTR.    symptom improvement. Healing.  We discussed that CT symptoms may improve for several months after surgery.    PLAN:  - Surgery discussed, images reviewed if applicable, and all questions were answered at this time.  - dressing and steristrips removed, steri-strips applied in usual fashion, care instructions given and verbally acknowledged.  - Medications: Taper RX pain meds, OTC PRN.  - Physical Therapy: As instructed / RICE and PROM.  - AAT    Return to clinic PRN.    Marcos Adams PA-C    Dept. Orthopedic Surgery  [facility]     2/28/2024

## 2024-02-29 ENCOUNTER — OFFICE VISIT (OUTPATIENT)
Dept: ORTHOPEDICS | Facility: CLINIC | Age: 54
End: 2024-02-29
Payer: COMMERCIAL

## 2024-02-29 VITALS — SYSTOLIC BLOOD PRESSURE: 118 MMHG | BODY MASS INDEX: 33.45 KG/M2 | DIASTOLIC BLOOD PRESSURE: 76 MMHG | WEIGHT: 201 LBS

## 2024-02-29 DIAGNOSIS — Z47.89 ORTHOPEDIC AFTERCARE: Primary | ICD-10-CM

## 2024-02-29 PROCEDURE — 99024 POSTOP FOLLOW-UP VISIT: CPT | Performed by: PHYSICIAN ASSISTANT

## 2024-02-29 NOTE — Clinical Note
2/29/2024         RE: Natali Nicholson  [address]  [place] [address]        Dear Colleague,    Thank you for referring your patient, Natali Nicholson, to the [facility] [place]. Please see a copy of my visit note below.    HISTORY OF PRESENT ILLNESS:    Natali Nicholson is a 53 year old female who is seen in follow up for left Carpal tunnel release.  Present symptoms: Pt reports significant improvement to CT symptoms.  Is keeping wrist covered. Pt is using hand for activities. No new complaints.  Denies Chest pain, Calve pain, Fever, Chills.    Current Treatment: Postop.    PHYSICAL EXAM:  There were no vitals taken for this visit.  There is no weight on file to calculate BMI.   GENERAL APPEARANCE: healthy, alert and no distress   PSYCH: mentation appears normal and affect normal/bright    MSK:  {RIGHT:[id number]} Volar wrist.  Incision clean and dry, Sutures present, healing.  *** incisional erythema.   No Ecchymosis.  Edema *** at *** hand and digits.  CMS: joe incisional numbness, otherwise grossly intact to digits.  AROM: mild restriction in palmar wrist *** flexion, otherwise WNL.      ASSESSMENT:  Natali Nicholson is a 53 year old female  S/P *** CTR.  *** improvement. Healing.  We discussed that CT symptoms may improve for several months after surgery.    PLAN:  - Surgery discussed, images reviewed if applicable, and all questions were answered at this time.  - Sutures removed with sterile technique, steri-strips applied in usual fashion, care instructions given and verbally acknowledged.  - Medications: Taper RX pain meds, OTC PRN.  - Physical Therapy: As instructed / RICE and PROM.  - AAT    Return to clinic PRN.    Marcos Adams PA-C    Dept. Orthopedic Surgery  [facility]     2/28/2024       HISTORY OF PRESENT ILLNESS:    Natali Nicholson is a 53 year old female who is seen in follow up for left Carpal tunnel release.  Present symptoms:  Pt reports significant improvement to CT symptoms.  Is keeping wrist covered. Pt is using hand for activities. No new complaints.  Denies Chest pain, Calve pain, Fever, Chills.    Current Treatment: Postop.    PHYSICAL EXAM:  There were no vitals taken for this visit.  There is no weight on file to calculate BMI.   GENERAL APPEARANCE: healthy, alert and no distress   PSYCH: mentation appears normal and affect normal/bright    MSK:  Left:  Volar wrist.  Incision clean and dry, subcutaneous Sutures present, healing.  No incisional erythema.   No Ecchymosis.  Edema mild at wrist, hand and digits.  CMS: joe incisional numbness, otherwise grossly intact to digits.  AROM: mild restriction in palmar wrist ext/ flexion, otherwise WNL.      ASSESSMENT:  Natali Nicholson is a 53 year old female  S/P Left open CTR.    symptom improvement. Healing.  We discussed that CT symptoms may improve for several months after surgery.    PLAN:  - Surgery discussed, images reviewed if applicable, and all questions were answered at this time.  - dressing and steristrips removed, steri-strips applied in usual fashion, care instructions given and verbally acknowledged.  - Medications: Taper RX pain meds, OTC PRN.  - Physical Therapy: As instructed / RICE and PROM.  - AAT    Return to clinic PRN.    Marcos Adams PA-C    Dept. Orthopedic Surgery  [facility]     2/28/2024         Again, thank you for allowing me to participate in the care of your patient.        Sincerely,        Marcos Adams PA-C

## 2024-02-29 NOTE — PATIENT INSTRUCTIONS
Incision Care:    Showering ok after that time, however no soaking or scrubbing of incision for 1 weeks.  Steri-strips will most likely fall off on their own, however they may be removed after 1 weeks with rubbing alcohol if they have not.    If draining or bleeding stops the tape-strips are enough coverage unless you were instructed otherwise or you would like to cover for comfort.  If drainage or bleeding continues please cover with clean dressings.     Gradually increase your activities as you can tolerated them, starting at a level well below what you would normally do.  Lifting may be uncomfortable for a while.    The skin around the incision may peel or have a hard skin edge over the next couple weeks.  Also, the incision may be sensitive to the touch and be prominent due to scar tissue.  You may massage the area when tolerated several times per day, using lotion if needed, to desensitize the area and reduce scar tissue.    Also, the nerve involved may heal for over a year from the time of surgery.  During this time your symptoms may continue to improve or even recur temporarily and then resolve as part of the healing.    Follow up as needed in clinic.

## 2024-03-23 ENCOUNTER — HEALTH MAINTENANCE LETTER (OUTPATIENT)
Age: 54
End: 2024-03-23

## 2024-05-20 DIAGNOSIS — E11.9 TYPE 2 DIABETES MELLITUS WITHOUT COMPLICATION, WITHOUT LONG-TERM CURRENT USE OF INSULIN (H): ICD-10-CM

## 2024-05-20 RX ORDER — DULAGLUTIDE 3 MG/.5ML
3 INJECTION, SOLUTION SUBCUTANEOUS WEEKLY
Qty: 6 ML | Refills: 2 | Status: SHIPPED | OUTPATIENT
Start: 2024-05-20 | End: 2024-08-09

## 2024-05-20 NOTE — TELEPHONE ENCOUNTER
Topic: Non-Medical Question.     [facility] has denied my renewal for my Trulicity.  I have now changed insurance and am wondering if you could send my prescription to [facility] pharmacy on [address]?

## 2024-06-05 ENCOUNTER — MYC REFILL (OUTPATIENT)
Dept: INTERNAL MEDICINE | Facility: CLINIC | Age: 54
End: 2024-06-05
Payer: COMMERCIAL

## 2024-06-05 DIAGNOSIS — E11.9 TYPE 2 DIABETES MELLITUS WITHOUT COMPLICATION, WITHOUT LONG-TERM CURRENT USE OF INSULIN (H): ICD-10-CM

## 2024-06-05 RX ORDER — DULAGLUTIDE 3 MG/.5ML
3 INJECTION, SOLUTION SUBCUTANEOUS WEEKLY
Qty: 6 ML | Refills: 2 | OUTPATIENT
Start: 2024-06-05

## 2024-08-08 ENCOUNTER — MYC MEDICAL ADVICE (OUTPATIENT)
Dept: INTERNAL MEDICINE | Facility: CLINIC | Age: 54
End: 2024-08-08
Payer: COMMERCIAL

## 2024-08-08 ENCOUNTER — PATIENT OUTREACH (OUTPATIENT)
Dept: CARE COORDINATION | Facility: CLINIC | Age: 54
End: 2024-08-08
Payer: COMMERCIAL

## 2024-08-08 DIAGNOSIS — E78.5 HYPERLIPIDEMIA LDL GOAL <100: ICD-10-CM

## 2024-08-08 DIAGNOSIS — F32.0 MAJOR DEPRESSIVE DISORDER, SINGLE EPISODE, MILD (H): ICD-10-CM

## 2024-08-08 DIAGNOSIS — E03.9 HYPOTHYROIDISM, UNSPECIFIED TYPE: ICD-10-CM

## 2024-08-08 DIAGNOSIS — I10 ESSENTIAL HYPERTENSION WITH GOAL BLOOD PRESSURE LESS THAN 130/80: ICD-10-CM

## 2024-08-08 DIAGNOSIS — E11.9 TYPE 2 DIABETES MELLITUS WITHOUT COMPLICATION, WITHOUT LONG-TERM CURRENT USE OF INSULIN (H): ICD-10-CM

## 2024-08-09 RX ORDER — DULOXETIN HYDROCHLORIDE 30 MG/1
CAPSULE, DELAYED RELEASE ORAL
Qty: 60 CAPSULE | Refills: 5 | Status: SHIPPED | OUTPATIENT
Start: 2024-08-09 | End: 2024-09-21

## 2024-08-09 RX ORDER — LEVOTHYROXINE SODIUM 112 UG/1
112 TABLET ORAL DAILY
Qty: 30 TABLET | Refills: 5 | Status: CANCELLED | OUTPATIENT
Start: 2024-08-09

## 2024-08-09 RX ORDER — LISINOPRIL 40 MG/1
TABLET ORAL
Qty: 30 TABLET | Refills: 5 | Status: SHIPPED | OUTPATIENT
Start: 2024-08-09 | End: 2024-09-21

## 2024-08-09 RX ORDER — LEVOTHYROXINE SODIUM 112 UG/1
112 TABLET ORAL DAILY
Qty: 30 TABLET | Refills: 5 | Status: SHIPPED | OUTPATIENT
Start: 2024-08-09 | End: 2024-09-21

## 2024-08-09 RX ORDER — ATORVASTATIN CALCIUM 40 MG/1
40 TABLET, FILM COATED ORAL DAILY
Qty: 30 TABLET | Refills: 5 | Status: CANCELLED | OUTPATIENT
Start: 2024-08-09

## 2024-08-09 RX ORDER — DULOXETIN HYDROCHLORIDE 30 MG/1
CAPSULE, DELAYED RELEASE ORAL
Qty: 60 CAPSULE | Refills: 5 | Status: CANCELLED | OUTPATIENT
Start: 2024-08-09

## 2024-08-09 RX ORDER — DULAGLUTIDE 3 MG/.5ML
3 INJECTION, SOLUTION SUBCUTANEOUS WEEKLY
Qty: 6 ML | Refills: 0 | Status: SHIPPED | OUTPATIENT
Start: 2024-08-09 | End: 2024-09-20

## 2024-08-09 RX ORDER — ATORVASTATIN CALCIUM 40 MG/1
40 TABLET, FILM COATED ORAL DAILY
Qty: 30 TABLET | Refills: 5 | Status: SHIPPED | OUTPATIENT
Start: 2024-08-09 | End: 2024-09-21

## 2024-08-09 RX ORDER — DULAGLUTIDE 3 MG/.5ML
3 INJECTION, SOLUTION SUBCUTANEOUS WEEKLY
Qty: 6 ML | Refills: 2 | Status: CANCELLED | OUTPATIENT
Start: 2024-08-09

## 2024-08-09 RX ORDER — LISINOPRIL 40 MG/1
TABLET ORAL
Qty: 30 TABLET | Refills: 5 | Status: CANCELLED | OUTPATIENT
Start: 2024-08-09

## 2024-08-09 NOTE — TELEPHONE ENCOUNTER
Pt. Requests prescriptions be rerouted to [facility] on [address] and to be dispensed 30-day increments d/t insurance request

## 2024-08-10 ENCOUNTER — HEALTH MAINTENANCE LETTER (OUTPATIENT)
Age: 54
End: 2024-08-10

## 2024-08-13 ENCOUNTER — LAB (OUTPATIENT)
Dept: LAB | Facility: CLINIC | Age: 54
End: 2024-08-13
Payer: COMMERCIAL

## 2024-08-13 DIAGNOSIS — E11.9 TYPE 2 DIABETES MELLITUS WITHOUT COMPLICATION, WITHOUT LONG-TERM CURRENT USE OF INSULIN (H): ICD-10-CM

## 2024-08-13 LAB — HBA1C MFR BLD: 8 % (ref 0–5.6)

## 2024-08-13 PROCEDURE — 83036 HEMOGLOBIN GLYCOSYLATED A1C: CPT

## 2024-08-13 PROCEDURE — 36415 COLL VENOUS BLD VENIPUNCTURE: CPT

## 2024-08-28 ENCOUNTER — TELEPHONE (OUTPATIENT)
Dept: INTERNAL MEDICINE | Facility: CLINIC | Age: 54
End: 2024-08-28
Payer: COMMERCIAL

## 2024-08-28 NOTE — TELEPHONE ENCOUNTER
Reason for Call:  Appointment Request -   Follow-up appt to discuss labwork done 8/13/24.     Requested provider: Pablo Gomez    Reason patient unable to be scheduled: Need approval from PCP for Friday appt sooner than appt scheduled 10/28/24.    When does patient want to be seen/preferred time: 2-4 weeks.    Comments: Pt prefers a Friday appt (am or pm).    Could we send this information to you in "[employer]" or would you prefer to receive a phone call?:   Patient would prefer a phone call     Okay to leave a detailed message?: Yes at Cell number on file:    Telephone Information:   Mobile [phone]     Call taken on 8/28/2024 at 2:22 PM by Jacqui Mccoy

## 2024-08-29 NOTE — TELEPHONE ENCOUNTER
A1C level worsened showing need for improved diabetic blood sugar contriol. If has to be seen on a Friday only , pt to see me Friday 9/20/24 at 11:30am in clinic in current open [facility] appt slot. Schedule appt and tell pt to check blood sugars twice a day (before breakfast and bedtime) for 4 days prior to the appointment with me, write them down and have them available to review with the appointment

## 2024-08-29 NOTE — TELEPHONE ENCOUNTER
A hard stop was given when scheduling, [email or web address] was email to remove hard stop and waiting for response. There is still a hold on the time and day for when hard stop is lifted.

## 2024-08-29 NOTE — TELEPHONE ENCOUNTER
Called and relayed to pt and they agreed to plan and appt. Issue with scheduling so appt slot on hold until issue is fixed   Jaimie MENDOZA

## 2024-09-20 ENCOUNTER — OFFICE VISIT (OUTPATIENT)
Dept: INTERNAL MEDICINE | Facility: CLINIC | Age: 54
End: 2024-09-20
Payer: COMMERCIAL

## 2024-09-20 VITALS
TEMPERATURE: 97.3 F | HEART RATE: 89 BPM | SYSTOLIC BLOOD PRESSURE: 107 MMHG | WEIGHT: 208.4 LBS | HEIGHT: 65 IN | BODY MASS INDEX: 34.72 KG/M2 | DIASTOLIC BLOOD PRESSURE: 75 MMHG | OXYGEN SATURATION: 96 %

## 2024-09-20 DIAGNOSIS — E66.811 CLASS 1 OBESITY WITH SERIOUS COMORBIDITY AND BODY MASS INDEX (BMI) OF 34.0 TO 34.9 IN ADULT, UNSPECIFIED OBESITY TYPE: ICD-10-CM

## 2024-09-20 DIAGNOSIS — E78.5 HYPERLIPIDEMIA LDL GOAL <100: ICD-10-CM

## 2024-09-20 DIAGNOSIS — Z23 NEED FOR DIPHTHERIA-TETANUS-PERTUSSIS (TDAP) VACCINE: ICD-10-CM

## 2024-09-20 DIAGNOSIS — E11.9 TYPE 2 DIABETES MELLITUS WITHOUT COMPLICATION, WITHOUT LONG-TERM CURRENT USE OF INSULIN (H): ICD-10-CM

## 2024-09-20 DIAGNOSIS — Z23 NEED FOR PROPHYLACTIC VACCINATION AGAINST STREPTOCOCCUS PNEUMONIAE (PNEUMOCOCCUS): ICD-10-CM

## 2024-09-20 DIAGNOSIS — Z12.31 ENCOUNTER FOR SCREENING MAMMOGRAM FOR BREAST CANCER: ICD-10-CM

## 2024-09-20 DIAGNOSIS — I10 ESSENTIAL HYPERTENSION WITH GOAL BLOOD PRESSURE LESS THAN 130/80: ICD-10-CM

## 2024-09-20 DIAGNOSIS — E03.9 HYPOTHYROIDISM, UNSPECIFIED TYPE: ICD-10-CM

## 2024-09-20 DIAGNOSIS — F32.5 MAJOR DEPRESSIVE DISORDER WITH SINGLE EPISODE, IN FULL REMISSION (H): ICD-10-CM

## 2024-09-20 PROCEDURE — 90677 PCV20 VACCINE IM: CPT | Performed by: INTERNAL MEDICINE

## 2024-09-20 PROCEDURE — 90472 IMMUNIZATION ADMIN EACH ADD: CPT | Performed by: INTERNAL MEDICINE

## 2024-09-20 PROCEDURE — 99207 PR FOOT EXAM NO CHARGE: CPT | Mod: 25 | Performed by: INTERNAL MEDICINE

## 2024-09-20 PROCEDURE — 99214 OFFICE O/P EST MOD 30 MIN: CPT | Mod: 25 | Performed by: INTERNAL MEDICINE

## 2024-09-20 PROCEDURE — 90715 TDAP VACCINE 7 YRS/> IM: CPT | Performed by: INTERNAL MEDICINE

## 2024-09-20 PROCEDURE — 90471 IMMUNIZATION ADMIN: CPT | Performed by: INTERNAL MEDICINE

## 2024-09-20 RX ORDER — DULAGLUTIDE 3 MG/.5ML
3 INJECTION, SOLUTION SUBCUTANEOUS WEEKLY
Qty: 6 ML | Refills: 3 | Status: SHIPPED | OUTPATIENT
Start: 2024-09-20

## 2024-09-20 ASSESSMENT — PATIENT HEALTH QUESTIONNAIRE - PHQ9
10. IF YOU CHECKED OFF ANY PROBLEMS, HOW DIFFICULT HAVE THESE PROBLEMS MADE IT FOR YOU TO DO YOUR WORK, TAKE CARE OF THINGS AT HOME, OR GET ALONG WITH OTHER PEOPLE: NOT DIFFICULT AT ALL
SUM OF ALL RESPONSES TO PHQ QUESTIONS 1-9: 0
SUM OF ALL RESPONSES TO PHQ QUESTIONS 1-9: 0

## 2024-09-20 NOTE — PATIENT INSTRUCTIONS
Continue current medications  Prescriptions refilled at your pharmacy.    Call  [phone] or use [employer] to schedule a future lab appointment  non-fasting in later November  Repeat fasting labs in 6 months   Eye exam Spring 2025. Ask them to fax us the report  of the visit to [phone] attention Dr Gomez  Reduce calorie/carbohydrate (sugar, bread, potato, pasta, rice, alcohol etc)  intake in diet.  Increase color on your plate with vegetables. Increase  frequency of walking or other aerobic exercise as able (goal is daily)  Mammogram   9/21/24 or later. This will be done at the [facility]. Call [phone] or use [employer] to schedule.   Vaccination given today: Prevnar 20 vaccine (pneumonia risk reduction) and TDAP  vaccine (tetanus, whooping cough risk reduction)  I would recommend an updated covid vaccination and an influenza/flu vaccination in the Fall ( mid October) 2024 at the clinic or any pharmacy

## 2024-09-20 NOTE — PROGRESS NOTES
"  ASSESSMENT:    1. Type 2 diabetes mellitus without complication, without long-term current use of insulin (H)   Blood sugars now well controlled with restarting Trulicity. Recent A1C mostly reflects time off Trulicity. Continue current meds and diet and repeat A1C 2 mos (3 mos after restarting Trulicity)  - Dulaglutide (TRULICITY) 3 MG/0.5ML SOPN; Inject 3 mg subcutaneously once a week.  Dispense: 6 mL; Refill: 3  - Hemoglobin A1c; Future  - FOOT EXAM  - metFORMIN (GLUCOPHAGE) 1000 MG tablet; TAKE 1 TABLET TWICE A DAY  WITH BREAKFAST AND DINNER  Dispense: 180 tablet; Refill: 3  - Hemoglobin A1c; Future  - Comprehensive metabolic panel; Future  - Albumin Random Urine Quantitative with Creat Ratio; Future    2. HYPERLIPIDEMIA LDL GOAL <100  Controlled. Continue current med and repeat labs 6 mos  - atorvastatin (LIPITOR) 40 MG tablet; Take 1 tablet (40 mg) by mouth daily.  Dispense: 90 tablet; Refill: 3  - Comprehensive metabolic panel; Future  - Lipid panel reflex to direct LDL Fasting; Future    3. Class 1 obesity with serious comorbidity and body mass index (BMI) of 34.0 to 34.9 in adult, unspecified obesity type   Weight up off of Trulicity. Counseled re\": diet and exercie with restarting Trulicity. Contributing to HTN, lipids, DM    4. Hypothyroidism, unspecified type  Controlled. Continue current med and repeat labs 6 mos  - levothyroxine (SYNTHROID/LEVOTHROID) 112 MCG tablet; Take 1 tablet (112 mcg) by mouth daily.  Dispense: 90 tablet; Refill: 3  - TSH with free T4 reflex; Future    5. Essential hypertension with goal blood pressure less than 130/80  Controlled. Continue current med and repeat labs 6 mos  - lisinopril (ZESTRIL) 40 MG tablet; TAKE 1 TABLET DAILY (DOSE  CHANGE)  Dispense: 90 tablet; Refill: 3  - Comprehensive metabolic panel; Future    6. Major depressive disorder with single episode, in full remission (H24)   IN remission. PHQ9 = 0. Continue current medication   - DULoxetine (CYMBALTA) 30 MG " capsule; TAKE 1 CAPSULE TWICE DAILY  Dispense: 180 capsule; Refill: 3    7. Need for prophylactic vaccination against Streptococcus pneumoniae (pneumococcus)  Candidate for vaccination  - Pneumococcal 20 Valent Conjugate (Prevnar 20)    8. Need for diphtheria-tetanus-pertussis (Tdap) vaccine  Candidate for vaccination  - TDAP 10-64Y (ADACEL,BOOSTRIX)    9. Encounter for screening mammogram for breast cancer   Due for mammogram later this month. No sx  - MA Screen Bilateral w/Adria; Future            PLAN:  Continue current medications  Prescriptions refilled at your pharmacy.    Call  [phone] or use [employer] to schedule a future lab appointment  non-fasting in later November  Repeat fasting labs in 6 months   Eye exam Spring 2025. Ask them to fax us the report  of the visit to [phone] attention Dr Gomez  Reduce calorie/carbohydrate (sugar, bread, potato, pasta, rice, alcohol etc)  intake in diet.  Increase color on your plate with vegetables. Increase  frequency of walking or other aerobic exercise as able (goal is daily)  Mammogram   9/21/24 or later. This will be done at the [facility]. Call [phone] or use [employer] to schedule.   Vaccination given today: Prevnar 20 vaccine (pneumonia risk reduction) and TDAP  vaccine (tetanus, whooping cough risk reduction)  I would recommend an updated covid vaccination and an influenza/flu vaccination in the Fall ( mid October) 2024 at the clinic or any pharmacy           Claudine Hurst is a 54 year old, presenting for the following health issues:  Results        History of Present Illness       Diabetes:   She presents for follow up of diabetes.  She is checking home blood glucose one time daily.   She checks blood glucose before meals.  Blood glucose is never over 200 and never under 70. She is aware of hypoglycemia symptoms including dizziness.    She has no concerns regarding her diabetes at this time.   She is not experiencing numbness or  burning in feet, excessive thirst, blurry vision, weight changes or redness, sores or blisters on feet.           She eats 2-3 servings of fruits and vegetables daily.She consumes 3 sweetened beverage(s) daily.She exercises with enough effort to increase her heart rate 10 to 19 minutes per day.  She exercises with enough effort to increase her heart rate 3 or less days per week.   She is taking medications regularly.       Most recent lab results reviewed with pt.      Component      Latest Ref Rng 2/7/2024  10:22 AM 2/7/2024  10:24 AM 8/13/2024  10:08 AM   Sodium      135 - 145 mmol/L 137      Potassium      3.4 - 5.3 mmol/L 4.7      Carbon Dioxide (CO2)      22 - 29 mmol/L 25      Anion Gap      7 - 15 mmol/L 9      Urea Nitrogen      6.0 - 20.0 mg/dL 10.1      Creatinine      0.51 - 0.95 mg/dL 0.82      GFR Estimate      >60 mL/min/1.73m2 86      Calcium      8.6 - 10.0 mg/dL 9.6      Chloride      98 - 107 mmol/L 103      Glucose      70 - 99 mg/dL 110 (H)      Alkaline Phosphatase      40 - 150 U/L 59      AST      0 - 45 U/L 21      ALT      0 - 50 U/L 20      Protein Total      6.4 - 8.3 g/dL 7.9      Albumin      3.5 - 5.2 g/dL 4.5      Bilirubin Total      <=1.2 mg/dL 0.5      WBC      4.0 - 11.0 10e3/uL 5.7      RBC Count      3.80 - 5.20 10e6/uL 4.88      Hemoglobin      11.7 - 15.7 g/dL 13.8      Hematocrit      35.0 - 47.0 % 42.4      MCV      78 - 100 fL 87      MCH      26.5 - 33.0 pg 28.3      MCHC      31.5 - 36.5 g/dL 32.5      RDW      10.0 - 15.0 % 12.2      Platelet Count      150 - 450 10e3/uL 296      Cholesterol      <200 mg/dL 138      Triglycerides      <150 mg/dL 78      HDL Cholesterol      >=50 mg/dL 42 (L)      LDL Cholesterol Calculated      <=100 mg/dL 80      Non HDL Cholesterol      <130 mg/dL 96      Patient Fasting? Yes      Creatinine Urine      mg/dL  157.0     Albumin Urine mg/L      mg/L  <12.0     Albumin Urine mg/g Cr  --     Hemoglobin A1C      0.0 - 5.6 % 6.5 (H)   8.0 (H)  "   TSH      0.30 - 4.20 uIU/mL 1.55          Pt had run out of Trulicity 3 mos ago due to loss of job with quitting and having to wait for new insurance to start.  Was off the medication for 2 mos. Was able to finally restart the medication 1 month ago. Since that time on Trulicity, blood sugars doing well again.   Sugars AM, PM:  114,100  99,138  113,166  132,134  127,139  104    Denies CP, SOB, abdominal pain, polyuria, polydipsia, vision changes, extremity numbness/parasthesias or skin problems.  Weight  up 11 pounds after prior 10 pound weight loss when on Trulicity longterm. Motivated to lose weight again      Additional ROS:   Constitutional, HEENT, Cardiovascular, Pulmonary, GI and , Neuro, MSK and Psych review of systems/symptoms are otherwise negative or unchanged from previous, except as noted above.      OBJECTIVE:  /75   Pulse 89   Temp 97.3  F (36.3  C) (Temporal)   Ht 1.651 m (5' 5\")   Wt 94.5 kg (208 lb 6.4 oz)   LMP 09/07/2024 (Approximate)   SpO2 96%   BMI 34.68 kg/m     Estimated body mass index is 34.68 kg/m  as calculated from the following:    Height as of this encounter: 1.651 m (5' 5\").    Weight as of this encounter: 94.5 kg (208 lb 6.4 oz).     Neck: no adenopathy. Thyroid normal to palpation. No bruits  Pulm: Lungs clear to auscultation   CV: Regular rates and rhythm  GI: Soft, obese, nontender, Normal active bowel sounds, No hepatosplenomegaly or masses palpable  Ext: Peripheral pulses intact. No edema. No foot lesions  Neuro: Normal strength and tone, sensory exam grossly normal to LTS feet bilaterally        (Chart documentation was completed, in part, with [employer] / [employer] voice-recognition software. Even though reviewed, some grammatical, spelling, and word errors may remain.)    Pablo Gomez MD  Internal Medicine Department  [facility]            "

## 2024-09-21 RX ORDER — ATORVASTATIN CALCIUM 40 MG/1
40 TABLET, FILM COATED ORAL DAILY
Qty: 90 TABLET | Refills: 3 | Status: SHIPPED | OUTPATIENT
Start: 2024-09-21

## 2024-09-21 RX ORDER — DULOXETIN HYDROCHLORIDE 30 MG/1
CAPSULE, DELAYED RELEASE ORAL
Qty: 180 CAPSULE | Refills: 3 | Status: SHIPPED | OUTPATIENT
Start: 2024-09-21

## 2024-09-21 RX ORDER — LISINOPRIL 40 MG/1
TABLET ORAL
Qty: 90 TABLET | Refills: 3 | Status: SHIPPED | OUTPATIENT
Start: 2024-09-21

## 2024-09-21 RX ORDER — LEVOTHYROXINE SODIUM 112 UG/1
112 TABLET ORAL DAILY
Qty: 90 TABLET | Refills: 3 | Status: SHIPPED | OUTPATIENT
Start: 2024-09-21

## 2024-10-08 ENCOUNTER — ANCILLARY PROCEDURE (OUTPATIENT)
Dept: MAMMOGRAPHY | Facility: CLINIC | Age: 54
End: 2024-10-08
Attending: INTERNAL MEDICINE
Payer: COMMERCIAL

## 2024-10-08 DIAGNOSIS — Z12.31 ENCOUNTER FOR SCREENING MAMMOGRAM FOR BREAST CANCER: ICD-10-CM

## 2024-10-08 DIAGNOSIS — Z12.31 VISIT FOR SCREENING MAMMOGRAM: ICD-10-CM

## 2024-10-08 PROCEDURE — 77067 SCR MAMMO BI INCL CAD: CPT | Mod: TC | Performed by: RADIOLOGY

## 2024-10-08 PROCEDURE — 77063 BREAST TOMOSYNTHESIS BI: CPT | Mod: TC | Performed by: RADIOLOGY

## 2025-01-31 ENCOUNTER — TELEPHONE (OUTPATIENT)
Dept: INTERNAL MEDICINE | Facility: CLINIC | Age: 55
End: 2025-01-31
Payer: COMMERCIAL

## 2025-01-31 ENCOUNTER — TRANSFERRED RECORDS (OUTPATIENT)
Dept: HEALTH INFORMATION MANAGEMENT | Facility: CLINIC | Age: 55
End: 2025-01-31
Payer: COMMERCIAL

## 2025-01-31 LAB — RETINOPATHY: NEGATIVE

## 2025-01-31 NOTE — TELEPHONE ENCOUNTER
Prior Authorization Retail Medication Request    Medication/Dose: Dulaglutide (TRULICITY) 3 MG/0.5ML SOPN   Diagnosis and ICD code (if different than what is on RX):  [E11.9]   New/renewal/insurance change PA/secondary ins. PA:      Insurance   Payor: [employer]  Plan: [employer] OTHER  Sponsor Code: 1840  Subscriber ID: [id number]     Pharmacy Information (if different than what is on RX)  Name:  [facility]  Phone:  [phone]  Fax:[phone]    Clinic Information  Preferred routing pool for dept communication: [facility]

## 2025-02-05 NOTE — TELEPHONE ENCOUNTER
Prior Authorization Approval    Authorization Effective Date: 2/5/2025  Authorization Expiration Date: 2/5/2028  Medication: Dulaglutide (TRULICITY) 3 MG/0.5ML SOPN -PA APPROVED   Approved Dose/Quantity:   Reference #:     Insurance Company: [employer] - Phone [phone] Fax [phone]  Expected CoPay:       CoPay Card Available:      Foundation Assistance Needed:    Which Pharmacy is filling the prescription (Not needed for infusion/clinic administered): [id number] - [place], [place] - [address] AT [facility] [address]  Pharmacy Notified:  Yes- **Instructed pharmacy to notify patient when script is ready to /ship.**  Patient Notified:  Yes

## 2025-02-05 NOTE — TELEPHONE ENCOUNTER
Central Prior Authorization Team   Phone: [phone]    PA Initiation    Medication: Dulaglutide (TRULICITY) 3 MG/0.5ML SOPN   Insurance Company: [employer] - Phone [phone] Fax [phone]  Pharm[facility]illing the Rx: [id number] - [place], [place] - [address] AT [facility] [address]  Filling Pharmacy Phone: [phone]  Filling Pharmacy Fax:    Start Date: 2/5/2025

## 2025-02-22 ENCOUNTER — HEALTH MAINTENANCE LETTER (OUTPATIENT)
Age: 55
End: 2025-02-22

## 2025-04-12 ENCOUNTER — HEALTH MAINTENANCE LETTER (OUTPATIENT)
Age: 55
End: 2025-04-12

## 2025-08-25 ENCOUNTER — OFFICE VISIT (OUTPATIENT)
Dept: INTERNAL MEDICINE | Facility: CLINIC | Age: 55
End: 2025-08-25
Payer: COMMERCIAL

## 2025-08-25 VITALS
HEART RATE: 68 BPM | DIASTOLIC BLOOD PRESSURE: 76 MMHG | RESPIRATION RATE: 15 BRPM | WEIGHT: 195.5 LBS | TEMPERATURE: 97.5 F | OXYGEN SATURATION: 100 % | BODY MASS INDEX: 32.57 KG/M2 | HEIGHT: 65 IN | SYSTOLIC BLOOD PRESSURE: 122 MMHG

## 2025-08-25 DIAGNOSIS — E11.9 TYPE 2 DIABETES MELLITUS WITHOUT COMPLICATION, WITHOUT LONG-TERM CURRENT USE OF INSULIN (H): Primary | ICD-10-CM

## 2025-08-25 DIAGNOSIS — F32.5 MAJOR DEPRESSIVE DISORDER WITH SINGLE EPISODE, IN FULL REMISSION: ICD-10-CM

## 2025-08-25 DIAGNOSIS — E78.5 HYPERLIPIDEMIA LDL GOAL <100: ICD-10-CM

## 2025-08-25 DIAGNOSIS — E03.9 HYPOTHYROIDISM, UNSPECIFIED TYPE: ICD-10-CM

## 2025-08-25 DIAGNOSIS — I10 ESSENTIAL HYPERTENSION WITH GOAL BLOOD PRESSURE LESS THAN 130/80: ICD-10-CM

## 2025-08-25 DIAGNOSIS — Z12.31 ENCOUNTER FOR SCREENING MAMMOGRAM FOR BREAST CANCER: ICD-10-CM

## 2025-08-25 DIAGNOSIS — E66.811 CLASS 1 OBESITY WITH SERIOUS COMORBIDITY AND BODY MASS INDEX (BMI) OF 32.0 TO 32.9 IN ADULT, UNSPECIFIED OBESITY TYPE: ICD-10-CM

## 2025-08-25 PROCEDURE — 99214 OFFICE O/P EST MOD 30 MIN: CPT | Performed by: INTERNAL MEDICINE

## 2025-08-25 PROCEDURE — 99207 PR FOOT EXAM NO CHARGE: CPT | Performed by: INTERNAL MEDICINE

## 2025-08-25 PROCEDURE — 3074F SYST BP LT 130 MM HG: CPT | Performed by: INTERNAL MEDICINE

## 2025-08-25 PROCEDURE — 3078F DIAST BP <80 MM HG: CPT | Performed by: INTERNAL MEDICINE

## 2025-08-25 RX ORDER — DULAGLUTIDE 3 MG/.5ML
3 INJECTION, SOLUTION SUBCUTANEOUS WEEKLY
Qty: 6 ML | Refills: 3 | Status: SHIPPED | OUTPATIENT
Start: 2025-08-25

## 2025-08-25 RX ORDER — DULOXETIN HYDROCHLORIDE 30 MG/1
CAPSULE, DELAYED RELEASE ORAL
Qty: 180 CAPSULE | Refills: 3 | Status: SHIPPED | OUTPATIENT
Start: 2025-08-25

## 2025-08-25 RX ORDER — ATORVASTATIN CALCIUM 40 MG/1
40 TABLET, FILM COATED ORAL DAILY
Qty: 90 TABLET | Refills: 3 | Status: SHIPPED | OUTPATIENT
Start: 2025-08-25

## 2025-08-25 RX ORDER — LISINOPRIL 40 MG/1
TABLET ORAL
Qty: 90 TABLET | Refills: 3 | Status: SHIPPED | OUTPATIENT
Start: 2025-08-25

## 2025-08-25 RX ORDER — LEVOTHYROXINE SODIUM 112 UG/1
112 TABLET ORAL DAILY
Qty: 90 TABLET | Refills: 3 | Status: SHIPPED | OUTPATIENT
Start: 2025-08-25

## 2025-08-26 ENCOUNTER — PATIENT OUTREACH (OUTPATIENT)
Dept: CARE COORDINATION | Facility: CLINIC | Age: 55
End: 2025-08-26
Payer: COMMERCIAL

## (undated) DEVICE — DRSG TELFA ISLAND 4X10"

## (undated) DEVICE — LINEN ORTHO ACL PACK 5447

## (undated) DEVICE — DRSG ABDOMINAL 07 1/2X8" 7197D

## (undated) DEVICE — SOL NACL 0.9% IRRIG 500ML BOTTLE 2F7123

## (undated) DEVICE — ESU CLEANER TIP 31142717

## (undated) DEVICE — DRAPE MAYO STAND 23X54 8337

## (undated) DEVICE — RX SURGIFLO HEMOSTATIC MATRIX 8ML 2991

## (undated) DEVICE — SU MONOCRYL 4-0 PS-2 18" UND Y496G

## (undated) DEVICE — DECANTER VIAL 2006S

## (undated) DEVICE — PEN MARKING SKIN W/LABELS 31145884

## (undated) DEVICE — PACK SMALL SPINE RIDGES

## (undated) DEVICE — MIDAS REX DISSECTING TOOL  14MH30

## (undated) DEVICE — DRAIN JACKSON PRATT RESERVOIR 100ML SU130-1305

## (undated) DEVICE — BNDG ELASTIC 3"X5YDS UNSTERILE 6611-30

## (undated) DEVICE — DRSG KERLIX FLUFFS X5

## (undated) DEVICE — GLOVE PROTEXIS W/NEU-THERA 8.5  2D73TE85

## (undated) DEVICE — PACK HAND CUSTOM ASC

## (undated) DEVICE — LINEN POUCH DBL 5427

## (undated) DEVICE — SPONGE COTTONOID 1/2X1/2" 80-1400

## (undated) DEVICE — PREP CHLORAPREP 26ML TINTED ORANGE  260815

## (undated) DEVICE — BLADE KNIFE BEAVER MINI BEAVER6400

## (undated) DEVICE — COVER CAMERA IN-LIGHT DISP LT-C02

## (undated) DEVICE — GLOVE BIOGEL PI MICRO SZ 7.0 48570

## (undated) DEVICE — SPONGE SURGIFOAM 100 1974

## (undated) DEVICE — CAST PADDING 3" STERILE 9043S

## (undated) DEVICE — STPL SKIN 35W APPOSE 8886803712

## (undated) DEVICE — SU MONOCRYL 4-0 PS-2 27" UND Y426H

## (undated) DEVICE — PREP CHLORHEXIDINE 4% 4OZ (HIBICLENS) 57504

## (undated) DEVICE — ESU GROUND PAD ADULT W/CORD E7507

## (undated) DEVICE — PREP DURAPREP 26ML APL 8630

## (undated) DEVICE — SU VICRYL 3-0 SH CR 8X18" J774

## (undated) DEVICE — DRSG STERI STRIP 1X5" R1548

## (undated) DEVICE — LINEN DRAPE 54X72" 5467

## (undated) DEVICE — SUCTION MANIFOLD NEPTUNE 2 SYS 4 PORT 0702-020-000

## (undated) DEVICE — DRAPE IOBAN INCISE 23X17" 6650EZ

## (undated) DEVICE — DRAPE MICROSCOPE OPMI ZEISS 48X118" 306071-0000-000

## (undated) DEVICE — TAPE DURAPORE 3" SILK 1538-3

## (undated) DEVICE — NDL SPINAL 22GA 3.5" QUINCKE 405181

## (undated) DEVICE — ESU ELEC BLADE 2.75" COATED/INSULATED E1455

## (undated) DEVICE — SU VICRYL 2-0 CT-2 CR 8X18" J726D

## (undated) DEVICE — PREP SKIN SCRUB TRAY 4461A

## (undated) DEVICE — GLOVE PROTEXIS W/NEU-THERA 7.5  2D73TE75

## (undated) DEVICE — BLADE KNIFE SURG 11 371111

## (undated) DEVICE — LINEN ORTHO PACK 5446

## (undated) DEVICE — DRAIN JACKSON PRATT CHANNEL 10FR RND SIL W/TROCAR JP-2227

## (undated) DEVICE — DECANTER BAG 2002S

## (undated) DEVICE — TUBING SUCTION MEDI-VAC SOFT 3/16"X20' N520A

## (undated) DEVICE — DRAPE X-RAY TUBE 00-901169-01-OEC

## (undated) DEVICE — LINEN TOWEL PACK X5 5464

## (undated) DEVICE — GLOVE PROTEXIS BLUE W/NEU-THERA 8.5  2D73EB85

## (undated) DEVICE — GOWN REINFORCED XXLG 9071

## (undated) DEVICE — IMM COLLAR CERVICAL MED UNIVERSAL 3X24" 79-83500

## (undated) RX ORDER — EPHEDRINE SULFATE 50 MG/ML
INJECTION, SOLUTION INTRAMUSCULAR; INTRAVENOUS; SUBCUTANEOUS
Status: DISPENSED
Start: 2017-09-20

## (undated) RX ORDER — PROPOFOL 10 MG/ML
INJECTION, EMULSION INTRAVENOUS
Status: DISPENSED
Start: 2017-09-20

## (undated) RX ORDER — FENTANYL CITRATE-0.9 % NACL/PF 10 MCG/ML
PLASTIC BAG, INJECTION (ML) INTRAVENOUS
Status: DISPENSED
Start: 2023-03-30

## (undated) RX ORDER — LIDOCAINE HYDROCHLORIDE AND EPINEPHRINE 10; 10 MG/ML; UG/ML
INJECTION, SOLUTION INFILTRATION; PERINEURAL
Status: DISPENSED
Start: 2024-02-22

## (undated) RX ORDER — GLYCOPYRROLATE 0.2 MG/ML
INJECTION INTRAMUSCULAR; INTRAVENOUS
Status: DISPENSED
Start: 2017-09-20

## (undated) RX ORDER — FENTANYL CITRATE 50 UG/ML
INJECTION, SOLUTION INTRAMUSCULAR; INTRAVENOUS
Status: DISPENSED
Start: 2023-03-30

## (undated) RX ORDER — ACETAMINOPHEN 325 MG/1
TABLET ORAL
Status: DISPENSED
Start: 2024-02-22

## (undated) RX ORDER — ONDANSETRON 2 MG/ML
INJECTION INTRAMUSCULAR; INTRAVENOUS
Status: DISPENSED
Start: 2024-02-22

## (undated) RX ORDER — DEXAMETHASONE SODIUM PHOSPHATE 4 MG/ML
INJECTION, SOLUTION INTRA-ARTICULAR; INTRALESIONAL; INTRAMUSCULAR; INTRAVENOUS; SOFT TISSUE
Status: DISPENSED
Start: 2024-02-22

## (undated) RX ORDER — CEFAZOLIN SODIUM 2 G/100ML
INJECTION, SOLUTION INTRAVENOUS
Status: DISPENSED
Start: 2017-09-20

## (undated) RX ORDER — FENTANYL CITRATE 50 UG/ML
INJECTION, SOLUTION INTRAMUSCULAR; INTRAVENOUS
Status: DISPENSED
Start: 2017-09-20

## (undated) RX ORDER — GABAPENTIN 300 MG/1
CAPSULE ORAL
Status: DISPENSED
Start: 2023-03-30

## (undated) RX ORDER — ACETAMINOPHEN 325 MG/1
TABLET ORAL
Status: DISPENSED
Start: 2023-03-30

## (undated) RX ORDER — LIDOCAINE HYDROCHLORIDE AND EPINEPHRINE 10; 10 MG/ML; UG/ML
INJECTION, SOLUTION INFILTRATION; PERINEURAL
Status: DISPENSED
Start: 2023-03-30

## (undated) RX ORDER — KETAMINE HYDROCHLORIDE 10 MG/ML
INJECTION, SOLUTION INTRAMUSCULAR; INTRAVENOUS
Status: DISPENSED
Start: 2017-09-20

## (undated) RX ORDER — DEXAMETHASONE SODIUM PHOSPHATE 4 MG/ML
INJECTION, SOLUTION INTRA-ARTICULAR; INTRALESIONAL; INTRAMUSCULAR; INTRAVENOUS; SOFT TISSUE
Status: DISPENSED
Start: 2017-09-20

## (undated) RX ORDER — ONDANSETRON 2 MG/ML
INJECTION INTRAMUSCULAR; INTRAVENOUS
Status: DISPENSED
Start: 2017-09-20

## (undated) RX ORDER — PROPOFOL 10 MG/ML
INJECTION, EMULSION INTRAVENOUS
Status: DISPENSED
Start: 2023-03-30

## (undated) RX ORDER — PROPOFOL 10 MG/ML
INJECTION, EMULSION INTRAVENOUS
Status: DISPENSED
Start: 2024-02-22

## (undated) RX ORDER — LIDOCAINE HYDROCHLORIDE 10 MG/ML
INJECTION, SOLUTION EPIDURAL; INFILTRATION; INTRACAUDAL; PERINEURAL
Status: DISPENSED
Start: 2017-09-20

## (undated) RX ORDER — KETOROLAC TROMETHAMINE 30 MG/ML
INJECTION, SOLUTION INTRAMUSCULAR; INTRAVENOUS
Status: DISPENSED
Start: 2024-02-22